# Patient Record
Sex: FEMALE | Race: BLACK OR AFRICAN AMERICAN | RURAL
[De-identification: names, ages, dates, MRNs, and addresses within clinical notes are randomized per-mention and may not be internally consistent; named-entity substitution may affect disease eponyms.]

---

## 2018-03-07 ENCOUNTER — OFFICE VISIT (OUTPATIENT)
Dept: FAMILY MEDICINE CLINIC | Age: 38
End: 2018-03-07

## 2018-03-07 VITALS
RESPIRATION RATE: 18 BRPM | TEMPERATURE: 98.7 F | SYSTOLIC BLOOD PRESSURE: 129 MMHG | BODY MASS INDEX: 29.49 KG/M2 | HEIGHT: 65 IN | OXYGEN SATURATION: 96 % | DIASTOLIC BLOOD PRESSURE: 72 MMHG | HEART RATE: 79 BPM | WEIGHT: 177 LBS

## 2018-03-07 DIAGNOSIS — I10 ESSENTIAL HYPERTENSION: ICD-10-CM

## 2018-03-07 DIAGNOSIS — K21.9 GASTROESOPHAGEAL REFLUX DISEASE, ESOPHAGITIS PRESENCE NOT SPECIFIED: ICD-10-CM

## 2018-03-07 DIAGNOSIS — F41.8 DEPRESSION WITH ANXIETY: ICD-10-CM

## 2018-03-07 DIAGNOSIS — I63.9 CEREBROVASCULAR ACCIDENT (CVA), UNSPECIFIED MECHANISM (HCC): Primary | ICD-10-CM

## 2018-03-07 RX ORDER — GUAIFENESIN 100 MG/5ML
81 LIQUID (ML) ORAL DAILY
COMMUNITY
Start: 2018-03-07 | End: 2018-05-23 | Stop reason: SDUPTHER

## 2018-03-07 RX ORDER — PHENOL/SODIUM PHENOLATE
20 AEROSOL, SPRAY (ML) MUCOUS MEMBRANE DAILY
Qty: 30 TAB | Refills: 6 | COMMUNITY
Start: 2018-03-07 | End: 2018-05-24 | Stop reason: ALTCHOICE

## 2018-03-07 RX ORDER — ATENOLOL 50 MG/1
50 TABLET ORAL DAILY
COMMUNITY
Start: 2018-03-07 | End: 2018-05-23 | Stop reason: SDUPTHER

## 2018-03-07 RX ORDER — HYDROCHLOROTHIAZIDE 25 MG/1
25 TABLET ORAL DAILY
COMMUNITY
Start: 2018-03-07 | End: 2018-05-23 | Stop reason: SDUPTHER

## 2018-03-07 RX ORDER — ATORVASTATIN CALCIUM 40 MG/1
40 TABLET, FILM COATED ORAL DAILY
COMMUNITY
Start: 2018-03-07 | End: 2018-05-23 | Stop reason: SDUPTHER

## 2018-03-07 RX ORDER — FAMOTIDINE 20 MG/1
20 TABLET, FILM COATED ORAL
COMMUNITY
Start: 2018-03-07 | End: 2018-05-23 | Stop reason: SDUPTHER

## 2018-03-07 RX ORDER — LISINOPRIL 10 MG/1
10 TABLET ORAL
COMMUNITY
Start: 2018-03-07 | End: 2018-05-23 | Stop reason: SDUPTHER

## 2018-03-07 RX ORDER — ESCITALOPRAM OXALATE 10 MG/1
10 TABLET ORAL DAILY
Qty: 30 TAB | Refills: 1 | Status: SHIPPED | OUTPATIENT
Start: 2018-03-07 | End: 2018-04-18 | Stop reason: SDUPTHER

## 2018-03-07 NOTE — MR AVS SNAPSHOT
[facility] 
 
 
 [address] [address] 
[phone] Patient: Leonardo Kramer MRN: [id number] DCA:0/92/0098 Visit Information Date & Time Provider Department Dept. Phone Encounter #  
 3/7/2018  1:20 PM Sanjuana Lesches, MD 7 Shaunna Robles [id number] Follow-up Instructions Return in about 2 weeks (around 3/21/2018) for f/u 2 weeks for mood. Corrinne [place] Upcoming Health Maintenance Date Due DTaP/Tdap/Td series (1 - Tdap) 2/22/2002 PAP AKA CERVICAL CYTOLOGY 2/22/2002 Influenza Age 5 to Adult 8/1/2017 Allergies as of 3/7/2018  Review Complete On: 3/7/2018 By: Crescencio Quintero LPN Severity Noted Reaction Type Reactions Carvedilol  03/07/2018    Hives Tramadol  03/07/2018    Nausea Only  
 headaches Current Immunizations  Never Reviewed No immunizations on file. Not reviewed this visit You Were Diagnosed With   
  
 Codes Comments Cerebrovascular accident (CVA), unspecified mechanism ([facility]    -  Primary ICD-10-CM: I63.9 ICD-9-CM: 434.91 Essential hypertension     ICD-10-CM: I10 
ICD-9-CM: 401.9 Gastroesophageal reflux disease, esophagitis presence not specified     ICD-10-CM: K21.9 ICD-9-CM: 530.81 Depression with anxiety     ICD-10-CM: F41.8 ICD-9-CM: 300.4 Vitals BP Pulse Temp Resp Height(growth percentile) Weight(growth percentile) 129/72 79 98.7 °F (37.1 °C) (Oral) 18 5' 5\" (1.651 m) 177 lb (80.3 kg) LMP SpO2 BMI Smoking Status 02/07/2018 (Approximate) 96% 29.45 kg/m2 Former Smoker Vitals History BMI and BSA Data Body Mass Index Body Surface Area  
 29.45 kg/m 2 1.92 m 2 Preferred Pharmacy Pharmacy Name Phone [address] [place], [place] - [address] [phone] Your Updated Medication List  
  
   
 This list is accurate as of 3/7/18  2:13 PM.  Always use your most recent med list.  
  
  
  
  
 aspirin 81 mg chewable tablet Take 1 Tab by mouth daily. atenolol 50 mg tablet Commonly known as:  TENORMIN Take 1 Tab by mouth daily. atorvastatin 40 mg tablet Commonly known as:  LIPITOR Take 1 Tab by mouth daily. escitalopram oxalate 10 mg tablet Commonly known as:  Melvinia Calk Take 1 Tab by mouth daily. Indications: ANXIETY WITH DEPRESSION  
  
 famotidine 20 mg tablet Commonly known as:  PEPCID Take 1 Tab by mouth nightly. hydroCHLOROthiazide 25 mg tablet Commonly known as:  HYDRODIURIL Take 1 Tab by mouth daily. Indications: hypertension  
  
 lisinopril 10 mg tablet Commonly known as:  Luetta Manzanilla Take 1 Tab by mouth nightly. Indications: hypertension Omeprazole delayed release 20 mg tablet Commonly known as:  PRILOSEC D/R Take 1 Tab by mouth daily. Prescriptions Sent to Pharmacy Refills  
 escitalopram oxalate (LEXAPRO) 10 mg tablet 1 Sig: Take 1 Tab by mouth daily. Indications: ANXIETY WITH DEPRESSION Class: Normal  
 Pharmacy: [address] [place] [address] #: [phone] Route: Oral  
  
Follow-up Instructions Return in about 2 weeks (around 3/21/2018) for f/u 2 weeks for mood. .  
  
  
Patient Instructions Anxiety Disorder: Care Instructions Your Care Instructions Anxiety is a normal reaction to stress. Difficult situations can cause you to have symptoms such as sweaty palms and a nervous feeling. In an anxiety disorder, the symptoms are far more severe. Constant worry, muscle tension, trouble sleeping, nausea and diarrhea, and other symptoms can make normal daily activities difficult or impossible. These symptoms may occur for no reason, and they can affect your work, school, or social life. Medicines, counseling, and self-care can all help. Follow-up care is a key part of your treatment and safety. Be sure to make and go to all appointments, and call your doctor if you are having problems. It's also a good idea to know your test results and keep a list of the medicines you take. How can you care for yourself at home? · Take medicines exactly as directed. Call your doctor if you think you are having a problem with your medicine. · Go to your counseling sessions and follow-up appointments. · Recognize and accept your anxiety. Then, when you are in a situation that makes you anxious, say to yourself, \"This is not an emergency. I feel uncomfortable, but I am not in danger. I can keep going even if I feel anxious. \" · Be kind to your body: ¨ Relieve tension with exercise or a massage. ¨ Get enough rest. 
¨ Avoid alcohol, caffeine, nicotine, and illegal drugs. They can increase your anxiety level and cause sleep problems. ¨ Learn and do relaxation techniques. See below for more about these techniques. · Engage your mind. Get out and do something you enjoy. Go to a [employer] movie, or take a walk or hike. Plan your day. Having too much or too little to do can make you anxious. · Keep a record of your symptoms. Discuss your fears with a good friend or family member, or join a support group for people with similar problems. Talking to others sometimes relieves stress. · Get involved in social groups, or volunteer to help others. Being alone sometimes makes things seem worse than they are. · Get at least 30 minutes of exercise on most days of the week to relieve stress. Walking is a good choice. You also may want to do other activities, such as running, swimming, cycling, or playing tennis or team sports. Relaxation techniques Do relaxation exercises 10 to 20 minutes a day. You can play soothing, relaxing music while you do them, if you wish. · Tell others in your house that you are going to do your relaxation exercises. Ask them not to disturb you. · Find a comfortable place, away from all distractions and noise. · Lie down on your back, or sit with your back straight. · Focus on your breathing. Make it slow and steady. · Breathe in through your nose. Breathe out through either your nose or mouth. · Breathe deeply, filling up the area between your navel and your rib cage. Breathe so that your belly goes up and down. · Do not hold your breath. · Breathe like this for 5 to 10 minutes. Notice the feeling of calmness throughout your whole body. As you continue to breathe slowly and deeply, relax by doing the following for another 5 to 10 minutes: · Tighten and relax each muscle group in your body. You can begin at your toes and work your way up to your head. · Imagine your muscle groups relaxing and becoming heavy. · Empty your mind of all thoughts. · Let yourself relax more and more deeply. · Become aware of the state of calmness that surrounds you. · When your relaxation time is over, you can bring yourself back to alertness by moving your fingers and toes and then your hands and feet and then stretching and moving your entire body. Sometimes people fall asleep during relaxation, but they usually wake up shortly afterward. · Always give yourself time to return to full alertness before you drive a car or do anything that might cause an accident if you are not fully alert. Never play a relaxation tape while you drive a car. When should you call for help? Call 911 anytime you think you may need emergency care. For example, call if: 
? · You feel you cannot stop from hurting yourself or someone else. ? Keep the numbers for these national suicide hotlines: 1-098-127-TALK (6-512.185.2798) and 5-336-STYNJAW (3-168.898.8884). If you or someone you know talks about suicide or feeling hopeless, get help right away. ? Watch closely for changes in your health, and be sure to contact your doctor if: 
? · You have anxiety or fear that affects your life. ? · You have symptoms of anxiety that are new or different from those you had before. Where can you learn more? Go to http://rose-mike.info/. Enter P754 in the search box to learn more about \"Anxiety Disorder: Care Instructions. \" Current as of: May 12, 2017 Content Version: 11.4 © 2694-5523 [employer]. Care instructions adapted under license by [employer] (which disclaims liability or warranty for this information). If you have questions about a medical condition or this instruction, always ask your healthcare professional. [address] any warranty or liability for your use of this information. Introducing [facility]! [facility] introduces VSoft patient portal. Now you can access parts of your medical record, email your doctor's office, and request medication refills online. 1. In your internet browser, go to [email or web address] 2. Click on the First Time User? Click Here link in the Sign In box. You will see the New Member Sign Up page. 3. Enter your VSoft Access Code exactly as it appears below. You will not need to use this code after youve completed the sign-up process. If you do not sign up before the expiration date, you must request a new code. · VSoft Access Code: [email or web address] Expires: 6/5/2018  1:15 PM 
 
4. Enter the last four digits of your Social Security Number (xxxx) and Date of Birth (mm/dd/yyyy) as indicated and click Submit. You will be taken to the next sign-up page. 5. Create a [address] ID. This will be your VSoft login ID and cannot be changed, so think of one that is secure and easy to remember. 6. Create a VSoft password. You can change your password at any time. 7. Enter your Password Reset Question and Answer. This can be used at a later time if you forget your password. 8. Enter your e-mail address.  You will receive e-mail notification when new information is available in XtremIO. 9. Click Sign Up. You can now view and download portions of your medical record. 10. Click the Download Summary menu link to download a portable copy of your medical information. If you have questions, please visit the Frequently Asked Questions section of the XtremIO website. Remember, XtremIO is NOT to be used for urgent needs. For medical emergencies, dial 911. Now available from your iPhone and Android! Please provide this summary of care documentation to your next provider. If you have any questions after today's visit, please call [phone].

## 2018-03-07 NOTE — PATIENT INSTRUCTIONS
Anxiety Disorder: Care Instructions  Your Care Instructions    Anxiety is a normal reaction to stress. Difficult situations can cause you to have symptoms such as sweaty palms and a nervous feeling. In an anxiety disorder, the symptoms are far more severe. Constant worry, muscle tension, trouble sleeping, nausea and diarrhea, and other symptoms can make normal daily activities difficult or impossible. These symptoms may occur for no reason, and they can affect your work, school, or social life. Medicines, counseling, and self-care can all help. Follow-up care is a key part of your treatment and safety. Be sure to make and go to all appointments, and call your doctor if you are having problems. It's also a good idea to know your test results and keep a list of the medicines you take. How can you care for yourself at home? · Take medicines exactly as directed. Call your doctor if you think you are having a problem with your medicine. · Go to your counseling sessions and follow-up appointments. · Recognize and accept your anxiety. Then, when you are in a situation that makes you anxious, say to yourself, \"This is not an emergency. I feel uncomfortable, but I am not in danger. I can keep going even if I feel anxious. \"  · Be kind to your body:  ¨ Relieve tension with exercise or a massage. ¨ Get enough rest.  ¨ Avoid alcohol, caffeine, nicotine, and illegal drugs. They can increase your anxiety level and cause sleep problems. ¨ Learn and do relaxation techniques. See below for more about these techniques. · Engage your mind. Get out and do something you enjoy. Go to a funny movie, or take a walk or hike. Plan your day. Having too much or too little to do can make you anxious. · Keep a record of your symptoms. Discuss your fears with a good friend or family member, or join a support group for people with similar problems. Talking to others sometimes relieves stress.   · Get involved in social groups, or volunteer to help others. Being alone sometimes makes things seem worse than they are. · Get at least 30 minutes of exercise on most days of the week to relieve stress. Walking is a good choice. You also may want to do other activities, such as running, swimming, cycling, or playing tennis or team sports. Relaxation techniques  Do relaxation exercises 10 to 20 minutes a day. You can play soothing, relaxing music while you do them, if you wish. · Tell others in your house that you are going to do your relaxation exercises. Ask them not to disturb you. · Find a comfortable place, away from all distractions and noise. · Lie down on your back, or sit with your back straight. · Focus on your breathing. Make it slow and steady. · Breathe in through your nose. Breathe out through either your nose or mouth. · Breathe deeply, filling up the area between your navel and your rib cage. Breathe so that your belly goes up and down. · Do not hold your breath. · Breathe like this for 5 to 10 minutes. Notice the feeling of calmness throughout your whole body. As you continue to breathe slowly and deeply, relax by doing the following for another 5 to 10 minutes:  · Tighten and relax each muscle group in your body. You can begin at your toes and work your way up to your head. · Imagine your muscle groups relaxing and becoming heavy. · Empty your mind of all thoughts. · Let yourself relax more and more deeply. · Become aware of the state of calmness that surrounds you. · When your relaxation time is over, you can bring yourself back to alertness by moving your fingers and toes and then your hands and feet and then stretching and moving your entire body. Sometimes people fall asleep during relaxation, but they usually wake up shortly afterward. · Always give yourself time to return to full alertness before you drive a car or do anything that might cause an accident if you are not fully alert.  Never play a relaxation tape while you drive a car. When should you call for help? Call 911 anytime you think you may need emergency care. For example, call if:  ? · You feel you cannot stop from hurting yourself or someone else. ? Keep the numbers for these national suicide hotlines: 0-491-821-TALK (0-587-987--290-968-0704) and 6-627-EYJRDFD (2-203.966.8158). If you or someone you know talks about suicide or feeling hopeless, get help right away. ? Watch closely for changes in your health, and be sure to contact your doctor if:  ? · You have anxiety or fear that affects your life. ? · You have symptoms of anxiety that are new or different from those you had before. Where can you learn more? Go to http://rose-mike.info/. Enter P754 in the search box to learn more about \"Anxiety Disorder: Care Instructions. \"  Current as of: May 12, 2017  Content Version: 11.4  © 0471-7009 [employer]. Care instructions adapted under license by [employer] (which disclaims liability or warranty for this information). If you have questions about a medical condition or this instruction, always ask your healthcare professional. [address] any warranty or liability for your use of this information.

## 2018-03-07 NOTE — PROGRESS NOTES
Nella Hayes  41 y.o. female  1980  [address]  [address] [place] [address]  [id number]     [facility]: Progress Note       Encounter Date: 3/7/2018    Chief Complaint   Patient presents with    New Patient       History provided by patient  History of Present Illness   Nella Hayes is a 41 y.o. female who presents to clinic today for:      NEW PATIENT  New patient who presents to establish PCP care. I personally reviewed and updated the patient's medical, surgical, family and social history. PREVIOUS PRIMARY CARE PROVIDER and SPECIALISTS  Rashel. Patient decided to come to [facility] due to recommendation of her PT therapist.     RECORDS  Provided by patient: None. SPECIALISTS  Patient Care Team:  Olene Cooks, DO as Consulting Provider (Neurology)  Roselia Hernandez MD as Physician (Hematology and Oncology)  Saundra Koyanagi, MD as Physician (Cardiology)  Immanuel Silveira NP as Nurse Practitioner (Neurosurgery)    Stroke  Patient had embolic stroke with left side hemiparesis. Was treated by [facility] had embolectomy after trial of tPA. Has been followed OT, PT, and speech therapy. Has continued to follow with Dr. Perry Neal of [address] [place] [address] neurosurgery. Anxiety with depression Review:  Patient is seen for depression and anxiety disorder. Current treatment includes none. Prior treatments: none. Ongoig symptoms include: anhedonia, decreased sleep, depressed mood, poor concentration and psychomotor retardation none. Patient denies: suicidal ideation homocidal ideation. Reported side effects from the treatment: none    Health Maintenance  Patient states that she is   Health Maintenance Due   Topic Date Due    DTaP/Tdap/Td series (1 - Tdap) 02/22/2002    PAP AKA CERVICAL CYTOLOGY  02/22/2002    Influenza Age 5 to Adult  08/01/2017     Review of Systems   Review of Systems   Constitutional: Negative for chills, diaphoresis and fever. Respiratory: Negative for cough, shortness of breath and wheezing. Cardiovascular: Negative for chest pain, palpitations and leg swelling. Gastrointestinal: Negative for abdominal pain, constipation, diarrhea, nausea and vomiting. Skin: Negative for itching and rash. Neurological: Negative for dizziness, weakness and headaches. Psychiatric/Behavioral: Negative for depression and suicidal ideas. The patient is not nervous/anxious. Vitals/Objective:     Vitals:    03/07/18 1322   BP: 129/72   Pulse: 79   Resp: 18   Temp: 98.7 °F (37.1 °C)   TempSrc: Oral   SpO2: 96%   Weight: 177 lb (80.3 kg)   Height: 5' 5\" (1.651 m)     Body mass index is 29.45 kg/(m^2). Wt Readings from Last 3 Encounters:   03/07/18 177 lb (80.3 kg)       Physical Exam   Constitutional: She is oriented to person, place, and time. She appears well-developed and well-nourished. HENT:   Head: Normocephalic and atraumatic. Cardiovascular: Normal rate and regular rhythm. Pulmonary/Chest: Effort normal and breath sounds normal.   Abdominal: Bowel sounds are normal.   Musculoskeletal: She exhibits no edema or tenderness. Neurological: She is alert and oriented to person, place, and time. She exhibits abnormal muscle tone (left UE/LE is weaker 2/5). No results found for this or any previous visit (from the past 24 hour(s)). Assessment and Plan:     Encounter Diagnoses     ICD-10-CM ICD-9-CM   1. Cerebrovascular accident (CVA), unspecified mechanism ([facility] I63.9 434.91   2. Essential hypertension I10 401.9   3. Gastroesophageal reflux disease, esophagitis presence not specified K21.9 530.81   4. Depression with anxiety F41.8 300.4       1. Cerebrovascular accident (CVA), unspecified mechanism ([facility]  Patient is continuing with PT/OT/speech. - atorvastatin (LIPITOR) 40 mg tablet; Take 1 Tab by mouth daily. - aspirin 81 mg chewable tablet; Take 1 Tab by mouth daily. 2. Essential hypertension  Well controlled today. Patient would like to pend lab work until next week.    - lisinopril (PRINIVIL, ZESTRIL) 10 mg tablet; Take 1 Tab by mouth nightly. Indications: hypertension  - hydroCHLOROthiazide (HYDRODIURIL) 25 mg tablet; Take 1 Tab by mouth daily. Indications: hypertension  - atenolol (TENORMIN) 50 mg tablet; Take 1 Tab by mouth daily. 3. Gastroesophageal reflux disease, esophagitis presence not specified  - famotidine (PEPCID) 20 mg tablet; Take 1 Tab by mouth nightly. - Omeprazole delayed release (PRILOSEC D/R) 20 mg tablet; Take 1 Tab by mouth daily. Dispense: 30 Tab; Refill: 6    4. Depression with anxiety  Will start trial of lexapro. Close follow for side effects.   - escitalopram oxalate (LEXAPRO) 10 mg tablet; Take 1 Tab by mouth daily. Indications: ANXIETY WITH DEPRESSION  Dispense: 30 Tab; Refill: 1    I have discussed the diagnosis with the patient and the intended plan as seen in the above orders. she has expressed understanding. The patient has received an after-visit summary and questions were answered concerning future plans. I have discussed medication side effects and warnings with the patient as well. Electronically Signed: Tamia Chapa MD     History/Allergies   Patients past medical, surgical and family histories were reviewed and updated.     Past Medical History:   Diagnosis Date    Depression     GERD (gastroesophageal reflux disease)     Headache     Hypertension     Stroke [facility]) 11/2017 2017      Past Surgical History:   Procedure Laterality Date    HX DILATION AND CURETTAGE      D & C     Family History   Problem Relation Age of Onset    Schizophrenia Mother     Depression Mother     Anxiety Mother     Asthma Mother     Crohn's Disease Mother     Stroke Father     Hypertension Father     No Known Problems Sister     No Known Problems Brother     Other Son      pyloric stenosis    Cancer Maternal Grandfather      Lung    Other Paternal Grandfather      Aneursym    Colon Cancer Maternal Uncle     Cancer Maternal Uncle      Brain cancer     Social History     Social History    Marital status: SINGLE     Spouse name: N/A    Number of children: N/A    Years of education: N/A     Occupational History    Not on file. Social History Main Topics    Smoking status: Former Smoker     Packs/day: 1.80     Years: 10.00     Types: Cigarettes    Smokeless tobacco: Never Used    Alcohol use No    Drug use: No    Sexual activity: Not on file     Other Topics Concern    Not on file     Social History Narrative    No narrative on file         Allergies   Allergen Reactions    Carvedilol Hives    Tramadol Nausea Only     headaches       Disposition     Follow-up Disposition:  Return in about 2 weeks (around 3/21/2018) for f/u 2 weeks for mood. .    Future Appointments  Date Time Provider Norman Flores   3/21/2018 8:20 AM [facility]            Current Medications after this visit     Current Outpatient Prescriptions   Medication Sig    atorvastatin (LIPITOR) 40 mg tablet Take 1 Tab by mouth daily.  lisinopril (PRINIVIL, ZESTRIL) 10 mg tablet Take 1 Tab by mouth nightly. Indications: hypertension    famotidine (PEPCID) 20 mg tablet Take 1 Tab by mouth nightly.  hydroCHLOROthiazide (HYDRODIURIL) 25 mg tablet Take 1 Tab by mouth daily. Indications: hypertension    Omeprazole delayed release (PRILOSEC D/R) 20 mg tablet Take 1 Tab by mouth daily.  atenolol (TENORMIN) 50 mg tablet Take 1 Tab by mouth daily.  aspirin 81 mg chewable tablet Take 1 Tab by mouth daily.  escitalopram oxalate (LEXAPRO) 10 mg tablet Take 1 Tab by mouth daily. Indications: ANXIETY WITH DEPRESSION     No current facility-administered medications for this visit. There are no discontinued medications.

## 2018-03-23 ENCOUNTER — OFFICE VISIT (OUTPATIENT)
Dept: FAMILY MEDICINE CLINIC | Age: 38
End: 2018-03-23

## 2018-03-23 VITALS
DIASTOLIC BLOOD PRESSURE: 84 MMHG | WEIGHT: 174 LBS | TEMPERATURE: 98.3 F | BODY MASS INDEX: 28.99 KG/M2 | RESPIRATION RATE: 18 BRPM | HEIGHT: 65 IN | SYSTOLIC BLOOD PRESSURE: 145 MMHG | OXYGEN SATURATION: 100 % | HEART RATE: 61 BPM

## 2018-03-23 DIAGNOSIS — I63.9 CEREBROVASCULAR ACCIDENT (CVA), UNSPECIFIED MECHANISM (HCC): ICD-10-CM

## 2018-03-23 DIAGNOSIS — F32.1 MODERATE SINGLE CURRENT EPISODE OF MAJOR DEPRESSIVE DISORDER (HCC): ICD-10-CM

## 2018-03-23 DIAGNOSIS — Z01.419 WELL WOMAN EXAM WITH ROUTINE GYNECOLOGICAL EXAM: Primary | ICD-10-CM

## 2018-03-23 DIAGNOSIS — I10 ESSENTIAL HYPERTENSION: ICD-10-CM

## 2018-03-23 DIAGNOSIS — K21.9 GASTROESOPHAGEAL REFLUX DISEASE, ESOPHAGITIS PRESENCE NOT SPECIFIED: ICD-10-CM

## 2018-03-23 NOTE — PROGRESS NOTES
1. Have you been to the ER, urgent care clinic, or been hospitalized since your last visit? No     2. Have you seen or consulted any other health care providers outside of the [address] [place] [address] since your last visit?   No     Reviewed record in preparation for visit and have necessary documentation  opportunity was given for questions  Goals that were addressed and/or need to be completed during or after this appointment include     Health Maintenance Due   Topic Date Due    DTaP/Tdap/Td series (1 - Tdap) 02/22/2002    PAP AKA CERVICAL CYTOLOGY  02/22/2002    Influenza Age 9 to Adult  08/01/2017

## 2018-03-23 NOTE — PROGRESS NOTES
Well Woman Check Up       Subjective:     41 y.o.  F for routine annual exam    LMP:Patient's last menstrual period was 2018 (approximate). Menses:Regular.   Method of protection: none    Social History: single partner, contraception - none. Pertinent past medical history: hypertension, stroke. PAP History:   Patient reports hx of abnormal pap followed by normal pap smears. Colonoscopy:  n/a    DEXA: n/a    Lipids: n/a      Family History   Problem Relation Age of Onset   Rosalva Montgomery Schizophrenia Mother     Depression Mother     Anxiety Mother     Asthma Mother     Crohn's Disease Mother     Stroke Father     Hypertension Father     No Known Problems Sister     No Known Problems Brother     Other Son      pyloric stenosis    Cancer Maternal Grandfather      Lung    Other Paternal Grandfather      Aneursym    Colon Cancer Maternal Uncle     Cancer Maternal Uncle      Brain cancer     Social History   Substance Use Topics    Smoking status: Former Smoker     Packs/day: 1.80     Years: 10.00     Types: Cigarettes    Smokeless tobacco: Never Used    Alcohol use No        ROS:  Feeling well. No dyspnea or chest pain on exertion. No abdominal pain, change in bowel habits, black or bloody stools. No urinary tract symptoms. GYN ROS: no breast pain or new or enlarging lumps on self exam, she complains of white, thick discharge for the past month. No neurological complaints. Objective:     Visit Vitals    /89    Pulse 60    Temp 98.3 °F (36.8 °C) (Oral)    Resp 18    Ht 5' 5\" (1.651 m)    Wt 174 lb (78.9 kg)    LMP 2018 (Approximate)    SpO2 100%    BMI 28.96 kg/m2     Gen: The patient appears well, alert, oriented x 3, in no distress. HEENT:  Normal, atraumatic, KAYCE. Neck: supple. No adenopathy or thyromegaly. Lungs:  clear, good air entry, no wheezes, rhonchi or rales. CV: S1 and S2 normal, no murmurs, regular rate and rhythm.   Abdomen: soft without tenderness, guarding, mass or organomegaly. Extremities:  no edema, normal peripheral pulses. Neurological:normal, no focal findings. BREAST EXAM: breasts appear normal, no suspicious masses, no skin or nipple changes or axillary nodes. PELVIC EXAM: normal external genitalia, vulva, vagina, cervix, uterus and adnexa exam chaperoned by: Rachelle Wallace LPN    Assessment/Plan:     Encounter Diagnoses     ICD-10-CM ICD-9-CM   1. Well woman exam with routine gynecological exam Z01.419 V72.31   2. Essential hypertension I10 401.9   3. Gastroesophageal reflux disease, esophagitis presence not specified K21.9 530.81   4. Moderate single current episode of major depressive disorder (HCC) F32.1 296.22   5. Cerebrovascular accident (CVA), unspecified mechanism ([facility] [address] I63.9 434.91       1. Well woman exam with routine gynecological exam  - T PALLIDUM SCREEN W/REFLEX  - HIV 1/2 AG/AB, 4TH GENERATION,W RFLX CONFIRM  - CT/NG/T.VAGINALIS AMPLIFICATION  - PAP IG, APTIMA HPV AND RFX 16/18,45 ([id number])    2. Essential hypertension  Not at goal. Will have patient RTC in 1 month for recheck and med adjustment.   - METABOLIC PANEL, BASIC    3. Gastroesophageal reflux disease, esophagitis presence not specified  Patient has been on H2 blocker and PPI for > 1 reshma. Reports severe GERD and bloating if she attempts to taper.   - VITAMIN B12    4. Moderate single current episode of major depressive disorder [facility])  Patient has not yet started medication as she has to be have dental work done and was afraid it may have an interaction. 5. Cerebrovascular accident (CVA), unspecified mechanism ([facility] [address]  - LIPID PANEL  - HEMOGLOBIN A1C WITH EAG Sanjuana Lesches, MD  03/23/18    Follow-up Disposition:  Return in about 4 weeks (around 4/20/2018) for Mood and BP. No future appointments.

## 2018-03-23 NOTE — PATIENT INSTRUCTIONS
Learning About Pap Tests  What is a Pap test?    The Pap test (also called a Pap smear) is a screening test for cancer of the cervix, which is the lower part of the uterus that opens into the vagina. The test can help your doctor find early changes in the cells that could lead to cancer. During the test, the doctor or nurse will insert a tool called a speculum into your vagina. The speculum gently spreads apart the vaginal walls. That allows your doctor to see inside the vagina and the cervix. He or she uses a cotton swab or brush to collect cell samples from your cervix. Try to schedule the test when you're not having your period. To get ready for a Pap test, avoid douches, tampons, vaginal medicines, sprays, or powders for at least a day before you have the test.  When should you have a Pap test?  Women should start having Pap tests at age 24. If you are younger than 24 and are sexually active, it's still a good idea to have regular testing for sexually transmitted infections. · Women 21 to 30 should have Pap tests every 3 years. · Women 30 to 72 may continue to have a Pap test every 3 years as long as their results are normal. Or they can choose to have a Pap test and an HPV test. This is called co-testing. With co-testing, women can have screening every 5 years as long as their test results are normal.  · Women 72 and older may no longer need Pap tests. When to stop having Pap tests depends on your medical history, your overall health, and your risk of cervical cell changes or cervical cancer. Talk with your doctor about whether you should stop or continue to have Pap tests. He or she can help you decide. These recommendations do not apply to women who have had a serious abnormal Pap test result or who have certain health problems. Talk to your doctor about how often you should be tested. There is also a newer test called a primary HPV test. It is used in women ages 22 and older.  And it is done every 3 years, as long as a woman's test results are normal. A woman who has this test doesn't need to have a Pap test.  Having the HPV vaccine does not change your need for screening tests. Women who have had the HPV vaccine should follow the same screening schedules as women who have not had the vaccine. What happens after the test?  The sample of cells taken during your test will be sent to a lab so that an expert can look at the cells. It usually takes a week or two to get the results back. · A normal result means that the test did not find any abnormal cells in the sample. · An abnormal result can mean many things. Most of these are not cancer. The results of your test may be abnormal because:  ¨ You have an infection of the vagina or cervix, such as a yeast infection. ¨ You have an IUD (intrauterine device for birth control). ¨ You have low estrogen levels after menopause that are causing the cells to change. ¨ You have cell changes that may be a sign of precancer or cancer. The results are ranked based on how serious the changes might be. Where can you learn more? Go to http://rose-mike.info/. Enter P919 in the search box to learn more about \"Learning About Pap Tests. \"  Current as of: May 12, 2017  Content Version: 11.4  © 5910-9015 [employer]wise, [employer]. Care instructions adapted under license by [employer] (which disclaims liability or warranty for this information). If you have questions about a medical condition or this instruction, always ask your healthcare professional. [place] [address] any warranty or liability for your use of this information.

## 2018-03-23 NOTE — MR AVS SNAPSHOT
[address] [address] 
[phone] Patient: Akshat Rodriguez MRN: [id number] YCF:[birth year]8 Visit Information Date & Time Provider Department Dept. Phone Encounter #  
 3/23/2018  8:20 AM Fredrick Cooks, MD [address] [id number] Follow-up Instructions Return in about 4 weeks (around 4/20/2018) for Mood and BP. Upcoming Health Maintenance Date Due DTaP/Tdap/Td series (1 - Tdap) 2/22/2002 PAP AKA CERVICAL CYTOLOGY 2/22/2002 Influenza Age 5 to Adult 8/1/2017 Allergies as of 3/23/2018  Review Complete On: 3/23/2018 By: Fredrick Cooks, MD  
  
 Severity Noted Reaction Type Reactions Carvedilol  03/07/2018    Hives Tramadol  03/07/2018    Nausea Only  
 headaches Current Immunizations  Never Reviewed No immunizations on file. Not reviewed this visit You Were Diagnosed With   
  
 Codes Comments Well woman exam with routine gynecological exam    -  Primary ICD-10-CM: S56.835 ICD-9-CM: V72.31 Essential hypertension     ICD-10-CM: I10 
ICD-9-CM: 401.9 Gastroesophageal reflux disease, esophagitis presence not specified     ICD-10-CM: K21.9 ICD-9-CM: 530.81 Moderate single current episode of major depressive disorder (HCC)     ICD-10-CM: F32.1 ICD-9-CM: 296.22 Cerebrovascular accident (CVA), unspecified mechanism [facility]     ICD-10-CM: I63.9 ICD-9-CM: 434.91 Vitals BP Pulse Temp Resp Height(growth percentile) Weight(growth percentile) 160/89 60 98.3 °F (36.8 °C) (Oral) 18 5' 5\" (1.651 m) 174 lb (78.9 kg) LMP SpO2 BMI OB Status Smoking Status 03/05/2018 (Approximate) 100% 28.96 kg/m2 Having regular periods Former Smoker Vitals History BMI and BSA Data Body Mass Index Body Surface Area  
 28.96 kg/m 2 1.9 m 2 Preferred Pharmacy Pharmacy Name Phone [address] [place], [place] - [address] [phone] Your Updated Medication List  
  
   
This list is accurate as of 3/23/18  9:01 AM.  Always use your most recent med list.  
  
  
  
  
 aspirin 81 mg chewable tablet Take 1 Tab by mouth daily. atenolol 50 mg tablet Commonly known as:  TENORMIN Take 1 Tab by mouth daily. atorvastatin 40 mg tablet Commonly known as:  LIPITOR Take 1 Tab by mouth daily. escitalopram oxalate 10 mg tablet Commonly known as:  Hildy Buffalo Take 1 Tab by mouth daily. Indications: ANXIETY WITH DEPRESSION  
  
 famotidine 20 mg tablet Commonly known as:  PEPCID Take 1 Tab by mouth nightly. hydroCHLOROthiazide 25 mg tablet Commonly known as:  HYDRODIURIL Take 1 Tab by mouth daily. Indications: hypertension  
  
 lisinopril 10 mg tablet Commonly known as:  Marlane Khushboo Take 1 Tab by mouth nightly. Indications: hypertension Omeprazole delayed release 20 mg tablet Commonly known as:  PRILOSEC D/R Take 1 Tab by mouth daily. We Performed the Following CT/NG/T.VAGINALIS AMPLIFICATION [id number] CPT(R)] HEMOGLOBIN A1C WITH EAG [48707 CPT(R)] HIV 1/2 AG/AB, 4TH GENERATION,W RFLX CONFIRM [id number] CPT(R)] LIPID PANEL [09528 CPT(R)] METABOLIC PANEL, BASIC [71433 CPT(R)] PAP IG, APTIMA HPV AND RFX 16/18,45 (088600) [[id number] Custom] T PALLIDUM SCREEN W/REFLEX [[id number] Custom] VITAMIN B12 [id number] CPT(R)] Follow-up Instructions Return in about 4 weeks (around 4/20/2018) for Mood and BP. Patient Instructions Learning About Pap Tests What is a Pap test? 
 
The Pap test (also called a Pap smear) is a screening test for cancer of the cervix, which is the lower part of the uterus that opens into the vagina. The test can help your doctor find early changes in the cells that could lead to cancer. During the test, the doctor or nurse will insert a tool called a speculum into your vagina. The speculum gently spreads apart the vaginal walls. That allows your doctor to see inside the vagina and the cervix. He or she uses a cotton swab or brush to collect cell samples from your cervix. Try to schedule the test when you're not having your period. To get ready for a Pap test, avoid douches, tampons, vaginal medicines, sprays, or powders for at least a day before you have the test. 
When should you have a Pap test? 
Women should start having Pap tests at age 24. If you are younger than 24 and are sexually active, it's still a good idea to have regular testing for sexually transmitted infections. · Women 21 to 30 should have Pap tests every 3 years. · Women 30 to 72 may continue to have a Pap test every 3 years as long as their results are normal. Or they can choose to have a Pap test and an HPV test. This is called co-testing. With co-testing, women can have screening every 5 years as long as their test results are normal. 
· Women 72 and older may no longer need Pap tests. When to stop having Pap tests depends on your medical history, your overall health, and your risk of cervical cell changes or cervical cancer. Talk with your doctor about whether you should stop or continue to have Pap tests. He or she can help you decide. These recommendations do not apply to women who have had a serious abnormal Pap test result or who have certain health problems. Talk to your doctor about how often you should be tested. There is also a newer test called a primary HPV test. It is used in women ages 22 and older. And it is done every 3 years, as long as a woman's test results are normal. A woman who has this test doesn't need to have a Pap test. 
Having the HPV vaccine does not change your need for screening tests.  Women who have had the HPV vaccine should follow the same screening schedules as women who have not had the vaccine. What happens after the test? 
The sample of cells taken during your test will be sent to a lab so that an expert can look at the cells. It usually takes a week or two to get the results back. · A normal result means that the test did not find any abnormal cells in the sample. · An abnormal result can mean many things. Most of these are not cancer. The results of your test may be abnormal because: 
¨ You have an infection of the vagina or cervix, such as a yeast infection. ¨ You have an IUD (intrauterine device for birth control). ¨ You have low estrogen levels after menopause that are causing the cells to change. ¨ You have cell changes that may be a sign of precancer or cancer. The results are ranked based on how serious the changes might be. Where can you learn more? Go to http://rose-mike.info/. Enter P919 in the search box to learn more about \"Learning About Pap Tests. \" Current as of: May 12, 2017 Content Version: 11.4 © 9288-0476 [employer]. Care instructions adapted under license by [employer] (which disclaims liability or warranty for this information). If you have questions about a medical condition or this instruction, always ask your healthcare professional. [address] any warranty or liability for your use of this information. Introducing [facility]! [facility] introduces [employer] patient portal. Now you can access parts of your medical record, email your doctor's office, and request medication refills online. 1. In your internet browser, go to [email or web address] 2. Click on the First Time User? Click Here link in the Sign In box. You will see the New Member Sign Up page. 3. Enter your [employer] Access Code exactly as it appears below. You will not need to use this code after youve completed the sign-up process.  If you do not sign up before the expiration date, you must request a new code. · [employer] Access Code: [email or web address] Expires: 6/5/2018  2:15 PM 
 
4. Enter the last four digits of your Social Security Number (xxxx) and Date of Birth (mm/dd/yyyy) as indicated and click Submit. You will be taken to the next sign-up page. 5. Create a [employer] ID. This will be your [employer] login ID and cannot be changed, so think of one that is secure and easy to remember. 6. Create a [employer] password. You can change your password at any time. 7. Enter your Password Reset Question and Answer. This can be used at a later time if you forget your password. 8. Enter your e-mail address. You will receive e-mail notification when new information is available in [address]. 9. Click Sign Up. You can now view and download portions of your medical record. 10. Click the Download Summary menu link to download a portable copy of your medical information. If you have questions, please visit the Frequently Asked Questions section of the [employer] website. Remember, [employer] is NOT to be used for urgent needs. For medical emergencies, dial 911. Now available from your iPhone and Android! Please provide this summary of care documentation to your next provider. Your primary care clinician is listed as Larry Iqbal. If you have any questions after today's visit, please call [phone].

## 2018-03-27 ENCOUNTER — TELEPHONE (OUTPATIENT)
Dept: FAMILY MEDICINE CLINIC | Age: 38
End: 2018-03-27

## 2018-03-27 DIAGNOSIS — A59.01 TRICHOMONAL VAGINITIS: Primary | ICD-10-CM

## 2018-03-27 LAB
BUN SERPL-MCNC: 11 MG/DL (ref 6–20)
BUN/CREAT SERPL: 10 (ref 9–23)
C TRACH RRNA SPEC QL NAA+PROBE: NEGATIVE
CALCIUM SERPL-MCNC: 9.2 MG/DL (ref 8.7–10.2)
CHLORIDE SERPL-SCNC: 99 MMOL/L (ref 96–106)
CHOLEST SERPL-MCNC: 132 MG/DL (ref 100–199)
CO2 SERPL-SCNC: 28 MMOL/L (ref 18–29)
CREAT SERPL-MCNC: 1.13 MG/DL (ref 0.57–1)
CYTOLOGIST CVX/VAG CYTO: NORMAL
CYTOLOGY CVX/VAG DOC THIN PREP: NORMAL
DIAGNOSTIC IMP SPEC-IMP: NORMAL
DX ICD CODE: NORMAL
DX ICD CODE: NORMAL
EST. AVERAGE GLUCOSE BLD GHB EST-MCNC: 120 MG/DL
GFR SERPLBLD CREATININE-BSD FMLA CKD-EPI: 62 ML/MIN/1.73
GFR SERPLBLD CREATININE-BSD FMLA CKD-EPI: 72 ML/MIN/1.73
GLUCOSE SERPL-MCNC: 77 MG/DL (ref 65–99)
HBA1C MFR BLD: 5.8 % (ref 4.8–5.6)
HDLC SERPL-MCNC: 63 MG/DL
HIV 1+2 AB+HIV1 P24 AG SERPL QL IA: REACTIVE
HIV 2 AB SERPL QL IA: NEGATIVE
HIV1 AB SERPLBLD QL IA.RAPID: NEGATIVE
HIV1 RNA SERPL QL NAA+PROBE: NEGATIVE
HPV I/H RISK 4 DNA CVX QL PROBE+SIG AMP: NEGATIVE
INTERPRETATION: NEGATIVE
LDLC SERPL CALC-MCNC: 59 MG/DL (ref 0–99)
Lab: NORMAL
N GONORRHOEA RRNA SPEC QL NAA+PROBE: NEGATIVE
OTHER STN SPEC: NORMAL
PATH REPORT.FINAL DX SPEC: NORMAL
POTASSIUM SERPL-SCNC: 3.9 MMOL/L (ref 3.5–5.2)
SODIUM SERPL-SCNC: 144 MMOL/L (ref 134–144)
STAT OF ADQ CVX/VAG CYTO-IMP: NORMAL
T PALLIDUM AB SER QL IA: NEGATIVE
T VAGINALIS RRNA SPEC QL NAA+PROBE: NEGATIVE
TRIGL SERPL-MCNC: 50 MG/DL (ref 0–149)
VIT B12 SERPL-MCNC: 590 PG/ML (ref 232–1245)
VLDLC SERPL CALC-MCNC: 10 MG/DL (ref 5–40)

## 2018-03-27 RX ORDER — METRONIDAZOLE 500 MG/1
2000 TABLET ORAL
Qty: 4 TAB | Refills: 0 | Status: SHIPPED | OUTPATIENT
Start: 2018-03-27 | End: 2018-03-27

## 2018-03-27 NOTE — TELEPHONE ENCOUNTER
Called patient and verified ID x 2. Informed patient that her pap smear was normal and HPV was negative. Also informed her that she is positive for T. Vaginalis, which is an STI. She reports that she has not recent partners. Advised that treatment would be sent to pharmacy. Srinivasan Hicks, please complete Health Department notification.   Fredrick Cooks, MD

## 2018-03-28 ENCOUNTER — TELEPHONE (OUTPATIENT)
Dept: FAMILY MEDICINE CLINIC | Age: 38
End: 2018-03-28

## 2018-03-28 NOTE — TELEPHONE ENCOUNTER
Please call patient to schedule an appointment with Dr. Cheri Chaves for results. Notes for provider to see patient. Had discussed case with Dr. Aldo Caicedo, infectious disease. Feels that this is likely a false negative but recommends checking for autoimmune disease or sickle cell trait. Inquire as to what her occup[ation was prior to stroke for risk factors and if she has a hx of IVDA. Repeat test in 1 month.      Tamia Chapa MD

## 2018-03-29 ENCOUNTER — OFFICE VISIT (OUTPATIENT)
Dept: FAMILY MEDICINE CLINIC | Age: 38
End: 2018-03-29

## 2018-03-29 VITALS
HEART RATE: 59 BPM | SYSTOLIC BLOOD PRESSURE: 122 MMHG | OXYGEN SATURATION: 97 % | DIASTOLIC BLOOD PRESSURE: 69 MMHG | RESPIRATION RATE: 18 BRPM | TEMPERATURE: 98.5 F | HEIGHT: 65 IN | WEIGHT: 172.8 LBS | BODY MASS INDEX: 28.79 KG/M2

## 2018-03-29 DIAGNOSIS — Z78.9 FALSE POSITIVE HIV SEROLOGY: Primary | ICD-10-CM

## 2018-03-29 NOTE — PROGRESS NOTES
CC: Lab results    HPI: Pt is a 41 y.o. female who presents for lab results. Pt recently had a positive 4th generation HIV screening test, followed by negative HIV-Ab and RNA confirmatory tests. Her case was discussed with an Infectious Disease specialist and it was recommended to r/o autoimmune disease and sickle cell trait and retest in one month. Pt states that she has been extensively tested for autoimmune diseases and had an electrophoresis to r/o sickle trait. She cannot remember the results but knows it was done at [facility] or [facility] and does not want to repeat it today. Chart review shows a normal lupus anticoagulant, ESR, CRP, HIV and Syphilis IgG from 11/2017 among other normal w/u for coagulation disorders. Review of lab results also shows that she had evidence of trichomonas on pap testing but a nuswab (which resulted later) was negative. Pt states she has been having an awful time at home since being diagnosed with trichomonas and has been fighting with her long-time partner. Both deny sexual exposures outside of their relationship. She has not taken the Flagyl. She is feeling depressed and overwhelmed in general since her stroke, trying to figure out her new baseline and come to terms with the things she is no longer able to do. She reports decreased appetite and has lost 5lbs this month. She has a hard time falling asleep and a hard time concentrating but is not sure if some of that is residual from her stroke. She denies SI/HI. Pt reports she had been a very active person and also enjoyed reading. She is not able to exercise anymore because she gets dizzy and feels like she is going to fall. Her vision gets blurry after reading for a short time. Her Neurologist told her she is not able to work. She has a son who is active in sports and is feeling very badly that she cannot be as involved in his activities anymore. She is also not able to drive and cannot get him back and forth to practice.  She feels guilty that he is missing out. She was prescribed Lexapro at her last visit and filled it but has not taken it yet and is not sure she wants to. She also has a burning pain in her left arm since the stroke that has not improved with time or PT. Past Medical History:   Diagnosis Date    Depression     GERD (gastroesophageal reflux disease)     Headache     Hypertension     Stroke ([address] 11/2017 2017       Family History   Problem Relation Age of Onset    Schizophrenia Mother     Depression Mother     Anxiety Mother     Asthma Mother     Crohn's Disease Mother     Stroke Father     Hypertension Father     No Known Problems Sister     No Known Problems Brother     Other Son      pyloric stenosis    Cancer Maternal Grandfather      Lung    Other Paternal Grandfather      Aneursym    Colon Cancer Maternal Uncle     Cancer Maternal Uncle      Brain cancer       Social History   Substance Use Topics    Smoking status: Former Smoker     Packs/day: 1.80     Years: 10.00     Types: Cigarettes    Smokeless tobacco: Never Used    Alcohol use No       ROS:  Positive only when bolded  Constitutional: HA, F/C, changes in weight  Genitourinary: Vaginal discharge (reason for last visit)    PE:  Visit Vitals    /69 (BP 1 Location: Left arm, BP Patient Position: Sitting)    Pulse (!) 59    Temp 98.5 °F (36.9 °C) (Oral)    Resp 18    Ht 5' 5\" (1.651 m)    Wt 172 lb 12.8 oz (78.4 kg)    LMP 03/05/2018 (Approximate)    SpO2 97%    BMI 28.76 kg/m2     Gen: Pt sitting in chair, in NAD  Head: Normocephalic, atraumatic  Eyes: Sclera anicteric, EOM grossly intact  Throat: MMM  Neck: Supple  Extrem: Atraumatic, no cyanosis  Neuro: Alert, appropriate, sometimes with derailment of thought process. A/P: Pt is a 41 y.o. female who presents to discuss lab results. Long discussion with patient today about labs and plans going forward.  She would like to hold off on further testing at this point until we can get full records from [facility] and her hospitalization at [facility].   - HIV test: Advised that this is very likely false positive. Will repeat in one month, and if at that time it appears that autoimmune labs and Hgb electrophoresis have not been performed, will discuss getting those as well  - Trichomonas: Advised pt that pap cytology for trich has a higher false positive rate than HAL (which has very good sensitivity and specificity). Explained that the pap results came back first and that is why she was told she had trichomonas. Given the above information and lack of recent exposures, would say that cytology for trick is most likely also a false positive. Her significant other has an appt next week to be tested for trichomonas. Advised pt that we can discuss all of this with him at that visit if she is amenable to it. She plans to be present at that visit. Advised that she does not need to take the Flagyl  - Depression: Advised that this could be multifactorial, partly an adjustment disorder as she is trying to find her new baseline, and possibly an alteration of her brain chemistry from the stroke. Discussed that she will likely never be back at her old \"normal\" and our goal is to get her to a new \"normal\" that she is happy with. Advised that the Zoloft would likely help both her mood symptoms as well as her neuropathic pain. She can continue to think about this and we will f/u in 1 month. - RTC in 1 month for repeat HIV test    Over 50% of the 45 minutes face to face with Rolf Butler consisted of counseling and/or discussing treatment plans in reference to her HIV test, trichomonas test, stroke and depression, as above. Discussed diagnoses in detail with patient. Medication risks/benefits/side effects discussed with patient. All of the patient's questions were addressed. The patient understands and agrees with our plan of care.   The patient knows to call back if they are unsure of or forget any changes we discussed today or if the symptoms change. The patient received an After-Visit Summary which contains VS, orders, medication list and allergy list. This can be used as a \"mini-medical record\" should they have to seek medical care while out of town. Current Outpatient Prescriptions on File Prior to Visit   Medication Sig Dispense Refill    atorvastatin (LIPITOR) 40 mg tablet Take 1 Tab by mouth daily.  lisinopril (PRINIVIL, ZESTRIL) 10 mg tablet Take 1 Tab by mouth nightly. Indications: hypertension      famotidine (PEPCID) 20 mg tablet Take 1 Tab by mouth nightly.  hydroCHLOROthiazide (HYDRODIURIL) 25 mg tablet Take 1 Tab by mouth daily. Indications: hypertension      Omeprazole delayed release (PRILOSEC D/R) 20 mg tablet Take 1 Tab by mouth daily. 30 Tab 6    atenolol (TENORMIN) 50 mg tablet Take 1 Tab by mouth daily.  aspirin 81 mg chewable tablet Take 1 Tab by mouth daily.  escitalopram oxalate (LEXAPRO) 10 mg tablet Take 1 Tab by mouth daily. Indications: ANXIETY WITH DEPRESSION 30 Tab 1     No current facility-administered medications on file prior to visit.

## 2018-03-29 NOTE — PROGRESS NOTES
Chief Complaint   Patient presents with    Abnormal Lab Results     1. Have you been to the ER, urgent care clinic since your last visit? Hospitalized since your last visit? No    2. Have you seen or consulted any other health care providers outside of the [address] [place] [address] since your last visit? Include any pap smears or colon screening.  No

## 2018-03-29 NOTE — PATIENT INSTRUCTIONS
Escitalopram (By mouth)   Escitalopram (ep-cbw-QLS-oh-pram)  Treats depression and generalized anxiety disorder (HORACIO). Brand Name(s): Lexapro   There may be other brand names for this medicine. When This Medicine Should Not Be Used: This medicine is not right for everyone. Do not use it if you had an allergic reaction to escitalopram or citalopram.  How to Use This Medicine:   Liquid, Tablet  · Take this medicine as directed. You may need to take it for a month or more before you feel better. Your dose may need to be changed to find out what works best for you. · Measure the oral liquid medicine with a marked measuring spoon, oral syringe, or medicine cup. · This medicine should come with a Medication Guide. Ask your pharmacist for a copy if you do not have one. · Missed dose: Take a dose as soon as you remember. If it is almost time for your next dose, wait until then and take a regular dose. Do not take extra medicine to make up for a missed dose. · Store the medicine in a closed container at room temperature, away from heat, moisture, and direct light. Drugs and Foods to Avoid:   Ask your doctor or pharmacist before using any other medicine, including over-the-counter medicines, vitamins, and herbal products. · Do not use this medicine together with pimozide. Do not use this medicine and an MAO inhibitor (MAOI) within 14 days of each other. · Some medicines can affect how escitalopram works. Tell your doctor if you are using the following:   ¨ Buspirone, carbamazepine, fentanyl, lithium, Wojciech's wort, tramadol, or tryptophan supplements  ¨ Amphetamines  ¨ Blood thinner (including warfarin)  ¨ Diuretic (water pill)  ¨ NSAID pain or arthritis medicine (including aspirin, celecoxib, diclofenac, ibuprofen, naproxen)  ¨ Triptan medicine to treat migraine headaches (including sumatriptan)  · Tell your doctor if you use anything else that makes you sleepy.  Some examples are allergy medicine, narcotic pain medicine, and alcohol. · Do not drink alcohol while you are using this medicine. Warnings While Using This Medicine:   · Tell your doctor if you are pregnant or breastfeeding, or if you have kidney disease, liver disease, bleeding problems, glaucoma, heart disease, or a seizure disorder. · For some children, teenagers, and young adults, this medicine may increase mental or emotional problems. This may lead to thoughts of suicide and violence. Talk with your doctor right away if you have any thoughts or behavior changes that concern you. Tell your doctor if you or anyone in your family has a history of bipolar disorder or suicide attempts. · This medicine may cause the following problems:   ¨ Serotonin syndrome (more likely when taken with certain medicines)  ¨ Low sodium levels  ¨ Increased risk of bleeding problems  · This medicine may make you dizzy or drowsy. Do not drive or do anything that could be dangerous until you know how this medicine affects you. · Your doctor may want to monitor your child's weight and height, because this medicine may cause decreased appetite and weight loss in children. · Do not stop using this medicine suddenly. Your doctor will need to slowly decrease your dose before you stop it completely. · Your doctor will check your progress and the effects of this medicine at regular visits. Keep all appointments. · Keep all medicine out of the reach of children. Never share your medicine with anyone.   Possible Side Effects While Using This Medicine:   Call your doctor right away if you notice any of these side effects:  · Allergic reaction: Itching or hives, swelling in your face or hands, swelling or tingling in your mouth or throat, chest tightness, trouble breathing  · Anxiety, restlessness, fever, sweating, muscle spasms, nausea, vomiting, diarrhea, seeing or hearing things that are not there  · Confusion, weakness, and muscle twitching  · Eye pain, vision changes, seeing halos around lights  · Fast, pounding, or uneven heartbeat  · Feeling more excited or energetic than usual, racing thoughts, trouble sleeping  · Seizures  · Thoughts of hurting yourself or others, unusual behavior  · Unusual bleeding or bruising  If you notice these less serious side effects, talk with your doctor:   · Dizziness, drowsiness, or sleepiness  · Dry mouth  · Headache  · Nausea, constipation, diarrhea  · Sexual problems  If you notice other side effects that you think are caused by this medicine, tell your doctor. Call your doctor for medical advice about side effects. You may report side effects to FDA at 2-857-FDA-4350  © 2017 [address] Information is for End User's use only and may not be sold, redistributed or otherwise used for commercial purposes. The above information is an  only. It is not intended as medical advice for individual conditions or treatments. Talk to your doctor, nurse or pharmacist before following any medical regimen to see if it is safe and effective for you.

## 2018-03-29 NOTE — MR AVS SNAPSHOT
[address] 
 
 
 [address] [facility] [address] [address] 
[phone] Patient: Uli Martell MRN: [id number] MAY:[birth year] Visit Information Date & Time Provider Department Dept. Phone Encounter #  
 3/29/2018  3:45 PM Talita Magana  [facility] [phone] [id number] Follow-up Instructions Return in about 4 weeks (around 4/26/2018) for follow-up. Your Appointments 4/23/2018  9:30 AM  
ROUTINE CARE with Talita Magana MD  
[address] [facility] ([address]) Appt Note: 4wk f/u-Bp [address] [place] [id number] [address]  
[place] [address] [place] [id number] Upcoming Health Maintenance Date Due DTaP/Tdap/Td series (1 - Tdap) 2/22/2002 Influenza Age 5 to Adult 8/1/2017 PAP AKA CERVICAL CYTOLOGY 3/23/2021 Allergies as of 3/29/2018  Review Complete On: 3/29/2018 By: Dilan Jo LPN Severity Noted Reaction Type Reactions Carvedilol  03/07/2018    Hives Tramadol  03/07/2018    Nausea Only  
 headaches Current Immunizations  Never Reviewed No immunizations on file. Not reviewed this visit You Were Diagnosed With   
  
 Codes Comments False positive HIV serology    -  Primary ICD-10-CM: Z78.9 ICD-9-CM: V49.89 Vitals BP Pulse Temp Resp Height(growth percentile) Weight(growth percentile) 122/69 (BP 1 Location: Left arm, BP Patient Position: Sitting) (!) 59 98.5 °F (36.9 °C) (Oral) 18 5' 5\" (1.651 m) 172 lb 12.8 oz (78.4 kg) LMP SpO2 BMI OB Status Smoking Status 03/05/2018 (Approximate) 97% 28.76 kg/m2 Having regular periods Former Smoker Vitals History BMI and BSA Data Body Mass Index Body Surface Area 28.76 kg/m 2 1.9 m 2 Preferred Pharmacy Pharmacy Name Phone [address] [place], [place] - [address] [phone] Your Updated Medication List  
  
   
This list is accurate as of 3/29/18  4:32 PM.  Always use your most recent med list.  
  
  
  
  
 aspirin 81 mg chewable tablet Take 1 Tab by mouth daily. atenolol 50 mg tablet Commonly known as:  TENORMIN Take 1 Tab by mouth daily. atorvastatin 40 mg tablet Commonly known as:  LIPITOR Take 1 Tab by mouth daily. escitalopram oxalate 10 mg tablet Commonly known as:  Clementina Reshma Take 1 Tab by mouth daily. Indications: ANXIETY WITH DEPRESSION  
  
 famotidine 20 mg tablet Commonly known as:  PEPCID Take 1 Tab by mouth nightly. hydroCHLOROthiazide 25 mg tablet Commonly known as:  HYDRODIURIL Take 1 Tab by mouth daily. Indications: hypertension  
  
 lisinopril 10 mg tablet Commonly known as:  Metta Lawman Take 1 Tab by mouth nightly. Indications: hypertension Omeprazole delayed release 20 mg tablet Commonly known as:  PRILOSEC D/R Take 1 Tab by mouth daily. Follow-up Instructions Return in about 4 weeks (around 4/26/2018) for follow-up. Patient Instructions Escitalopram (By mouth) Escitalopram (fy-nlh-SIN-oh-pram) Treats depression and generalized anxiety disorder (HORACIO). Brand Name(s): Lexapro There may be other brand names for this medicine. When This Medicine Should Not Be Used: This medicine is not right for everyone. Do not use it if you had an allergic reaction to escitalopram or citalopram. 
How to Use This Medicine:  
Liquid, Tablet · Take this medicine as directed. You may need to take it for a month or more before you feel better. Your dose may need to be changed to find out what works best for you. · Measure the oral liquid medicine with a marked measuring spoon, oral syringe, or medicine cup. · This medicine should come with a Medication Guide.  Ask your pharmacist for a copy if you do not have one. · Missed dose: Take a dose as soon as you remember. If it is almost time for your next dose, wait until then and take a regular dose. Do not take extra medicine to make up for a missed dose. · Store the medicine in a closed container at room temperature, away from heat, moisture, and direct light. Drugs and Foods to Avoid: Ask your doctor or pharmacist before using any other medicine, including over-the-counter medicines, vitamins, and herbal products. · Do not use this medicine together with pimozide. Do not use this medicine and an MAO inhibitor (MAOI) within 14 days of each other. · Some medicines can affect how escitalopram works. Tell your doctor if you are using the following:  
¨ Buspirone, carbamazepine, fentanyl, lithium, Wojciech's wort, tramadol, or tryptophan supplements ¨ Amphetamines ¨ Blood thinner (including warfarin) ¨ Diuretic (water pill) ¨ NSAID pain or arthritis medicine (including aspirin, celecoxib, diclofenac, ibuprofen, naproxen) ¨ Triptan medicine to treat migraine headaches (including sumatriptan) · Tell your doctor if you use anything else that makes you sleepy. Some examples are allergy medicine, narcotic pain medicine, and alcohol. · Do not drink alcohol while you are using this medicine. Warnings While Using This Medicine: · Tell your doctor if you are pregnant or breastfeeding, or if you have kidney disease, liver disease, bleeding problems, glaucoma, heart disease, or a seizure disorder. · For some children, teenagers, and young adults, this medicine may increase mental or emotional problems. This may lead to thoughts of suicide and violence. Talk with your doctor right away if you have any thoughts or behavior changes that concern you. Tell your doctor if you or anyone in your family has a history of bipolar disorder or suicide attempts. · This medicine may cause the following problems: ¨ Serotonin syndrome (more likely when taken with certain medicines) ¨ Low sodium levels ¨ Increased risk of bleeding problems · This medicine may make you dizzy or drowsy. Do not drive or do anything that could be dangerous until you know how this medicine affects you. · Your doctor may want to monitor your child's weight and height, because this medicine may cause decreased appetite and weight loss in children. · Do not stop using this medicine suddenly. Your doctor will need to slowly decrease your dose before you stop it completely. · Your doctor will check your progress and the effects of this medicine at regular visits. Keep all appointments. · Keep all medicine out of the reach of children. Never share your medicine with anyone. Possible Side Effects While Using This Medicine:  
Call your doctor right away if you notice any of these side effects: · Allergic reaction: Itching or hives, swelling in your face or hands, swelling or tingling in your mouth or throat, chest tightness, trouble breathing · Anxiety, restlessness, fever, sweating, muscle spasms, nausea, vomiting, diarrhea, seeing or hearing things that are not there · Confusion, weakness, and muscle twitching · Eye pain, vision changes, seeing halos around lights · Fast, pounding, or uneven heartbeat · Feeling more excited or energetic than usual, racing thoughts, trouble sleeping · Seizures · Thoughts of hurting yourself or others, unusual behavior · Unusual bleeding or bruising If you notice these less serious side effects, talk with your doctor: · Dizziness, drowsiness, or sleepiness · Dry mouth 
· Headache · Nausea, constipation, diarrhea · Sexual problems If you notice other side effects that you think are caused by this medicine, tell your doctor. Call your doctor for medical advice about side effects. You may report side effects to FDA at 6-268-FDA-7068 © 2017 [address]  Information is for End User's use only and may not be sold, redistributed or otherwise used for commercial purposes. The above information is an  only. It is not intended as medical advice for individual conditions or treatments. Talk to your doctor, nurse or pharmacist before following any medical regimen to see if it is safe and effective for you. Introducing [facility]! [employer] introduces "[employer] patient portal. Now you can access parts of your medical record, email your doctor's office, and request medication refills online. 1. In your internet browser, go to [email or web address] 2. Click on the First Time User? Click Here link in the Sign In box. You will see the New Member Sign Up page. 3. Enter your "[employer] Access Code exactly as it appears below. You will not need to use this code after youve completed the sign-up process. If you do not sign up before the expiration date, you must request a new code. · "[employer] Access Code: [email or web address] Expires: 6/5/2018  2:15 PM 
 
4. Enter the last four digits of your Social Security Number (xxxx) and Date of Birth (mm/dd/yyyy) as indicated and click Submit. You will be taken to the next sign-up page. 5. Create a "[employer] ID. This will be your "[employer] login ID and cannot be changed, so think of one that is secure and easy to remember. 6. Create a "[employer] password. You can change your password at any time. 7. Enter your Password Reset Question and Answer. This can be used at a later time if you forget your password. 8. Enter your e-mail address. You will receive e-mail notification when new information is available in [address] 9. Click Sign Up. You can now view and download portions of your medical record. 10. Click the Download Summary menu link to download a portable copy of your medical information. If you have questions, please visit the Frequently Asked Questions section of the [employer] website. Remember, [employer] is NOT to be used for urgent needs. For medical emergencies, dial 911. Now available from your iPhone and Android! Please provide this summary of care documentation to your next provider. Your primary care clinician is listed as Cam Lawrecne. If you have any questions after today's visit, please call [phone].

## 2018-04-18 DIAGNOSIS — F41.8 DEPRESSION WITH ANXIETY: ICD-10-CM

## 2018-04-18 RX ORDER — ESCITALOPRAM OXALATE 10 MG/1
TABLET ORAL
Qty: 30 TAB | Refills: 0 | Status: SHIPPED | OUTPATIENT
Start: 2018-04-18 | End: 2018-05-24 | Stop reason: DRUGHIGH

## 2018-04-23 ENCOUNTER — TELEPHONE (OUTPATIENT)
Dept: FAMILY MEDICINE CLINIC | Age: 38
End: 2018-04-23

## 2018-04-23 NOTE — TELEPHONE ENCOUNTER
Patient is being followed by pre-diabetes. She is also 41year old and is not due for a mammogram until age 37 as she has not Famhx of breast cancer.      Winston Sam MD

## 2018-04-23 NOTE — TELEPHONE ENCOUNTER
Re Marcos called from [address] to advise Dr that pt has a Hx of being borderline diabetic.  Pt also is due for a mammogram.

## 2018-05-21 ENCOUNTER — TELEPHONE (OUTPATIENT)
Dept: FAMILY MEDICINE CLINIC | Age: 38
End: 2018-05-21

## 2018-05-23 DIAGNOSIS — I10 ESSENTIAL HYPERTENSION: ICD-10-CM

## 2018-05-23 DIAGNOSIS — K21.9 GASTROESOPHAGEAL REFLUX DISEASE, ESOPHAGITIS PRESENCE NOT SPECIFIED: ICD-10-CM

## 2018-05-23 DIAGNOSIS — I63.9 CEREBROVASCULAR ACCIDENT (CVA), UNSPECIFIED MECHANISM (HCC): ICD-10-CM

## 2018-05-23 RX ORDER — HYDROCHLOROTHIAZIDE 25 MG/1
25 TABLET ORAL DAILY
Qty: 30 TAB | Refills: 0 | Status: SHIPPED | OUTPATIENT
Start: 2018-05-23 | End: 2018-07-25 | Stop reason: SDUPTHER

## 2018-05-23 RX ORDER — GUAIFENESIN 100 MG/5ML
81 LIQUID (ML) ORAL DAILY
Qty: 90 TAB | Refills: 0 | Status: SHIPPED | OUTPATIENT
Start: 2018-05-23 | End: 2018-10-18 | Stop reason: SDUPTHER

## 2018-05-23 RX ORDER — FAMOTIDINE 20 MG/1
20 TABLET, FILM COATED ORAL
Qty: 30 TAB | Refills: 0 | Status: SHIPPED | OUTPATIENT
Start: 2018-05-23 | End: 2018-07-25 | Stop reason: SDUPTHER

## 2018-05-23 RX ORDER — LISINOPRIL 10 MG/1
10 TABLET ORAL
Qty: 30 TAB | Refills: 0 | Status: SHIPPED | OUTPATIENT
Start: 2018-05-23 | End: 2018-06-09 | Stop reason: SDUPTHER

## 2018-05-23 RX ORDER — ATORVASTATIN CALCIUM 40 MG/1
40 TABLET, FILM COATED ORAL DAILY
Qty: 30 TAB | Refills: 0 | Status: SHIPPED | OUTPATIENT
Start: 2018-05-23 | End: 2018-06-09 | Stop reason: SDUPTHER

## 2018-05-23 RX ORDER — ATENOLOL 50 MG/1
50 TABLET ORAL DAILY
Qty: 30 TAB | Refills: 0 | Status: SHIPPED | OUTPATIENT
Start: 2018-05-23 | End: 2018-06-25 | Stop reason: SDUPTHER

## 2018-05-24 ENCOUNTER — OFFICE VISIT (OUTPATIENT)
Dept: FAMILY MEDICINE CLINIC | Age: 38
End: 2018-05-24

## 2018-05-24 VITALS
HEIGHT: 65 IN | OXYGEN SATURATION: 100 % | DIASTOLIC BLOOD PRESSURE: 72 MMHG | HEART RATE: 54 BPM | WEIGHT: 166 LBS | RESPIRATION RATE: 18 BRPM | BODY MASS INDEX: 27.66 KG/M2 | SYSTOLIC BLOOD PRESSURE: 110 MMHG | TEMPERATURE: 97.6 F

## 2018-05-24 DIAGNOSIS — F32.1 CURRENT MODERATE EPISODE OF MAJOR DEPRESSIVE DISORDER WITHOUT PRIOR EPISODE (HCC): ICD-10-CM

## 2018-05-24 DIAGNOSIS — G93.9 BRAIN DAMAGE: ICD-10-CM

## 2018-05-24 DIAGNOSIS — I63.9 CEREBROVASCULAR ACCIDENT (CVA), UNSPECIFIED MECHANISM (HCC): ICD-10-CM

## 2018-05-24 DIAGNOSIS — Z78.9 FALSE POSITIVE HIV SEROLOGY: Primary | ICD-10-CM

## 2018-05-24 DIAGNOSIS — L20.9 ATOPIC DERMATITIS, UNSPECIFIED TYPE: ICD-10-CM

## 2018-05-24 DIAGNOSIS — K21.9 GASTROESOPHAGEAL REFLUX DISEASE, ESOPHAGITIS PRESENCE NOT SPECIFIED: ICD-10-CM

## 2018-05-24 RX ORDER — PANTOPRAZOLE SODIUM 40 MG/1
40 TABLET, DELAYED RELEASE ORAL DAILY
Qty: 30 TAB | Refills: 5 | Status: SHIPPED | OUTPATIENT
Start: 2018-05-24 | End: 2018-05-25 | Stop reason: ALTCHOICE

## 2018-05-24 RX ORDER — TRIAMCINOLONE ACETONIDE 1 MG/G
CREAM TOPICAL 2 TIMES DAILY
Qty: 15 G | Refills: 3 | Status: SHIPPED | OUTPATIENT
Start: 2018-05-24 | End: 2018-10-18 | Stop reason: SDUPTHER

## 2018-05-24 RX ORDER — ESCITALOPRAM OXALATE 20 MG/1
20 TABLET ORAL DAILY
Qty: 30 TAB | Refills: 5 | Status: SHIPPED | OUTPATIENT
Start: 2018-05-24 | End: 2018-10-18 | Stop reason: SDUPTHER

## 2018-05-24 NOTE — PROGRESS NOTES
1. Have you been to the ER, urgent care clinic since your last visit? Hospitalized since your last visit? no    2. Have you seen or consulted any other health care providers outside of the [facility] since your last visit? Including any pap smears or colon screening. Neurologist and psych doctor     Reviewed record in preparation for visit and have necessary documentation    Pt did not bring medication to office visit for review  Opportunity was given for questions    Goals that were addressed and/or need to be completed during or after this appointment include   Health Maintenance Due   Topic Date Due    DTaP/Tdap/Td series (1 - Tdap) 02/22/2002     Body mass index is 27.62 kg/(m^2).

## 2018-05-24 NOTE — PROGRESS NOTES
CC: f/u false positive HIV test    HPI: Pt is a 41 y.o. female who presents for f/u false-positive HIV test. She is a poor historian 2/2 brain damage from her stroke. The ID doctor had recommended repeat HIV in one month along with some basic Rheum labs and Hgb electrophoresis to look into causes of false-positive HIV, especially given her h/o stroke at young age. Pt was not able to refill her prilosec and isn't sure why. She continues to have problems with pain in her arm since the stroke but does not want to make any medication changes. She is not sure the Lexapro is helping for her mood, but is hesitant to switch it. She is amenable to increasing the dose. She has poor appetite and continues to lose weight. She is having a hard time adjusting to the limitations on her activity since her stroke. She is under the impression that Neurology does not want her to drive or exercise but is not sure of the details. We have not received the notes from this visit yet. She is overwhelmed by the number of doctors she is seeing and is having a hard time keeping track of what they are all recommending for her. She is still working with PT and OT and feels it is helpful. Her PT was recommending a sleeve/glove for her hand and she brought the paperwork to her Neurologist but never heard anything about it being ordered. She recently saw Cards and had a loop monitor placed to look for Afib as a source of emboli potentially causing her stroke. She has two itchy spots on her left shoulder that have been there for the past 3 days. She denies new soaps, detergents, shampoos, sheets, clothes, etc. She reports a h/o sensitive skin and gets hives easily. She has not tried anything for the spots.        Past Medical History:   Diagnosis Date    Depression     GERD (gastroesophageal reflux disease)     Headache     Hypertension     Stroke [facility]) 11/2017 2017       Family History   Problem Relation Age of Onset    Schizophrenia Mother     Depression Mother     Anxiety Mother     Asthma Mother     Crohn's Disease Mother     Stroke Father     Hypertension Father     No Known Problems Sister     No Known Problems Brother     Other Son      pyloric stenosis    Cancer Maternal Grandfather      Lung    Other Paternal Grandfather      Aneursym    Colon Cancer Maternal Uncle     Cancer Maternal Uncle      Brain cancer       Social History   Substance Use Topics    Smoking status: Former Smoker     Packs/day: 1.80     Years: 10.00     Types: Cigarettes    Smokeless tobacco: Never Used    Alcohol use No       ROS:  Positive only when bolded  Constitutional: HA, changes in weight  Respiratory: SOB, wheezing, cough  Cardiovascular: CP, palpitations  Musculoskeletal: Myalgias, arthralgias  Neurological: Changes in gait    PE:  Visit Vitals    /72 (BP 1 Location: Right arm, BP Patient Position: Sitting)    Pulse (!) 54    Temp 97.6 °F (36.4 °C) (Oral)    Resp 18    Ht 5' 5\" (1.651 m)    Wt 166 lb (75.3 kg)    LMP 05/01/2018 (Approximate)    SpO2 100%    BMI 27.62 kg/m2     Gen: Pt sitting in chair, in NAD  Head: Normocephalic, atraumatic  Eyes: Sclera anicteric, EOM grossly intact, PERRL  Throat: MMM, normal lips, tongue and gums  Neck: Supple. +Significant hair growth under chin. CVS: Normal S1, S2, no m/r/g  Resp: CTAB, no wheezes or rales  Extrem: Atraumatic, no cyanosis or edema  Pulses: 2+   Skin: Warm, dry. Two 1cm circular, raised, mildly erythematous patches on her posterior L shoulder. Neuro: Alert, some word finding difficulties. Weakness of LUE and mild contractures of L hand      A/P: Pt is a 41 y.o. female who presents for follow-up false positive HIV  - Will increase Lexapro to 20mg daily. Pt advised of several other options for mood and pain and she will think about those if this does not help. - Repeat HIV  - Hgb electrophoresis, RAJ, RF per ID.  If normal may discuss having her see Rheum for more extensive work-up, however at this time she is not amenable to going to another specialist  - Spoke with pharmacy and omeprazole is requiring a PA. Will try protonix  - Kenalog cream for rash - likely atopic dermatitis  - Will try to get records from her Neurologist at [address] [place] [address] (she is unsure of his name) and her PT (she thinks [facility])  - RTC in 1 month for follow-up      I have reviewed/discussed the above normal BMI with the patient. I have recommended the following interventions: dietary management education, guidance, and counseling . Discussed BMI and healthy weight, but also wanting to achieve this with diet/exercise, not weight loss from poor nutrition and appetite. Encouraged healthy foods and will speak with her Neurologist to determine if she is able to do any exercise. Discussed diagnoses in detail with patient. Medication risks/benefits/side effects discussed with patient. All of the patient's questions were addressed. The patient understands and agrees with our plan of care. The patient knows to call back if they are unsure of or forget any changes we discussed today or if the symptoms change. The patient received an After-Visit Summary which contains VS, orders, medication list and allergy list. This can be used as a \"mini-medical record\" should they have to seek medical care while out of town. Current Outpatient Prescriptions on File Prior to Visit   Medication Sig Dispense Refill    lisinopril (PRINIVIL, ZESTRIL) 10 mg tablet Take 1 Tab by mouth nightly. Indications: hypertension 30 Tab 0    atorvastatin (LIPITOR) 40 mg tablet Take 1 Tab by mouth daily. 30 Tab 0    hydroCHLOROthiazide (HYDRODIURIL) 25 mg tablet Take 1 Tab by mouth daily. Indications: hypertension 30 Tab 0    famotidine (PEPCID) 20 mg tablet Take 1 Tab by mouth nightly. 30 Tab 0    atenolol (TENORMIN) 50 mg tablet Take 1 Tab by mouth daily.  30 Tab 0    aspirin 81 mg chewable tablet Take 1 Tab by mouth daily. 90 Tab 0    escitalopram oxalate (LEXAPRO) 10 mg tablet TAKE ONE TABLET BY MOUTH EVERY DAY 30 Tab 0    Omeprazole delayed release (PRILOSEC D/R) 20 mg tablet Take 1 Tab by mouth daily. 30 Tab 6     No current facility-administered medications on file prior to visit.

## 2018-05-24 NOTE — PATIENT INSTRUCTIONS
1. We are increasing your Lexapro to 20mg daily. This is to help with your mood, sleep, and appetite. You can take two of your 10mg tablets until you run out and a new prescription is waiting for you at the pharmacy. 2. I sent you a cream for the spots on your shoulder. It is called Schedule Savvy. 3. I will talk to your Neurologist (head doctor) and your Physical Therapist to see what your restrictions are and what we can do to get your back to your regular activities. 4. I talked to the pharmacy about your acid reflux medication (prilosec) and we are working on getting that figured out. 5. We are going to check some labs today to try and figure out what is causing some of your medical issues. I will call you with those results. Gastroesophageal Reflux Disease (GERD): Care Instructions  Your Care Instructions    Gastroesophageal reflux disease (GERD) is the backward flow of stomach acid into the esophagus. The esophagus is the tube that leads from your throat to your stomach. A one-way valve prevents the stomach acid from moving up into this tube. When you have GERD, this valve does not close tightly enough. If you have mild GERD symptoms including heartburn, you may be able to control the problem with antacids or over-the-counter medicine. Changing your diet, losing weight, and making other lifestyle changes can also help reduce symptoms. Follow-up care is a key part of your treatment and safety. Be sure to make and go to all appointments, and call your doctor if you are having problems. It's also a good idea to know your test results and keep a list of the medicines you take. How can you care for yourself at home? · Take your medicines exactly as prescribed. Call your doctor if you think you are having a problem with your medicine. · Your doctor may recommend over-the-counter medicine. For mild or occasional indigestion, antacids, such as Tums, Gaviscon, Mylanta, or Maalox, may help.  Your doctor also may recommend over-the-counter acid reducers, such as Pepcid AC, Tagamet HB, Zantac 75, or Prilosec. Read and follow all instructions on the label. If you use these medicines often, talk with your doctor. · Change your eating habits. ¨ It's best to eat several small meals instead of two or three large meals. ¨ After you eat, wait 2 to 3 hours before you lie down. ¨ Chocolate, mint, and alcohol can make GERD worse. ¨ Spicy foods, foods that have a lot of acid (like tomatoes and oranges), and coffee can make GERD symptoms worse in some people. If your symptoms are worse after you eat a certain food, you may want to stop eating that food to see if your symptoms get better. · Do not smoke or chew tobacco. Smoking can make GERD worse. If you need help quitting, talk to your doctor about stop-smoking programs and medicines. These can increase your chances of quitting for good. · If you have GERD symptoms at night, raise the head of your bed 6 to 8 inches by putting the frame on blocks or placing a foam wedge under the head of your mattress. (Adding extra pillows does not work.)  · Do not wear tight clothing around your middle. · Lose weight if you need to. Losing just 5 to 10 pounds can help. When should you call for help? Call your doctor now or seek immediate medical care if:  ? · You have new or different belly pain. ? · Your stools are black and tarlike or have streaks of blood. ? Watch closely for changes in your health, and be sure to contact your doctor if:  ? · Your symptoms have not improved after 2 days. ? · Food seems to catch in your throat or chest.   Where can you learn more? Go to http://rose-mike.info/. Enter S299 in the search box to learn more about \"Gastroesophageal Reflux Disease (GERD): Care Instructions. \"  Current as of: May 12, 2017  Content Version: 11.4  © 7110-4686 [employer]wise, [employer].  Care instructions adapted under license by [employer] (which disclaims liability or warranty for this information). If you have questions about a medical condition or this instruction, always ask your healthcare professional. [address] any warranty or liability for your use of this information.

## 2018-05-24 NOTE — MR AVS SNAPSHOT
[address] [address] 
[phone] Patient: Dedrick Benavides MRN: [id number] ITB:[birth year] Visit Information Date & Time Provider Department Dept. Phone Encounter #  
 5/24/2018 10:20 AM Jacinta Baez MD  [facility] [place] [id number] Follow-up Instructions Return in about 4 weeks (around 6/21/2018) for follow-up. Upcoming Health Maintenance Date Due DTaP/Tdap/Td series (1 - Tdap) 2/22/2002 Influenza Age 5 to Adult 8/1/2018 PAP AKA CERVICAL CYTOLOGY 3/23/2021 Allergies as of 5/24/2018  Review Complete On: 5/24/2018 By: Gauri Monsalve RN Severity Noted Reaction Type Reactions Carvedilol  03/07/2018    Hives Tramadol  03/07/2018    Nausea Only  
 headaches Current Immunizations  Never Reviewed No immunizations on file. Not reviewed this visit You Were Diagnosed With   
  
 Codes Comments False positive HIV serology    -  Primary ICD-10-CM: Z78.9 ICD-9-CM: V49.89 Cerebrovascular accident (CVA), unspecified mechanism ([facility] [address]     ICD-10-CM: I63.9 ICD-9-CM: 434.91 Current moderate episode of major depressive disorder without prior episode ([facility] [address]     ICD-10-CM: F32.1 ICD-9-CM: 296.22 Atopic dermatitis, unspecified type     ICD-10-CM: L20.9 ICD-9-CM: 691.8 Gastroesophageal reflux disease, esophagitis presence not specified     ICD-10-CM: K21.9 ICD-9-CM: 530.81 Vitals BP Pulse Temp Resp Height(growth percentile) Weight(growth percentile) 110/72 (BP 1 Location: Right arm, BP Patient Position: Sitting) (!) 54 97.6 °F (36.4 °C) (Oral) 18 5' 5\" (1.651 m) 166 lb (75.3 kg) LMP SpO2 BMI OB Status Smoking Status 05/01/2018 (Approximate) 100% 27.62 kg/m2 Having regular periods Former Smoker Vitals History BMI and BSA Data  Body Mass Index Body Surface Area  
 27.62 kg/m 2 1.86 m 2  
  
  
 Preferred Pharmacy Pharmacy Name Phone [address] [facility] [phone] Your Updated Medication List  
  
   
This list is accurate as of 5/24/18 12:03 PM.  Always use your most recent med list.  
  
  
  
  
 aspirin 81 mg chewable tablet Take 1 Tab by mouth daily. atenolol 50 mg tablet Commonly known as:  TENORMIN Take 1 Tab by mouth daily. atorvastatin 40 mg tablet Commonly known as:  LIPITOR Take 1 Tab by mouth daily. escitalopram oxalate 20 mg tablet Commonly known as:  Rosslyn Cheryle Take 1 Tab by mouth daily. famotidine 20 mg tablet Commonly known as:  PEPCID Take 1 Tab by mouth nightly. hydroCHLOROthiazide 25 mg tablet Commonly known as:  HYDRODIURIL Take 1 Tab by mouth daily. Indications: hypertension  
  
 lisinopril 10 mg tablet Commonly known as:  Therisa Semen Take 1 Tab by mouth nightly. Indications: hypertension  
  
 pantoprazole 40 mg tablet Commonly known as:  PROTONIX Take 1 Tab by mouth daily. triamcinolone acetonide 0.1 % topical cream  
Commonly known as:  KENALOG Apply  to affected area two (2) times a day. use thin layer. Apply to left shoulder. Prescriptions Sent to Pharmacy Refills  
 escitalopram oxalate (LEXAPRO) 20 mg tablet 5 Sig: Take 1 Tab by mouth daily. Class: Normal  
 Pharmacy: [address] [place] [address] Ph #: [phone] Route: Oral  
 triamcinolone acetonide (KENALOG) 0.1 % topical cream 3 Sig: Apply  to affected area two (2) times a day. use thin layer. Apply to left shoulder. Class: Normal  
 Pharmacy: [address] [place] [address] Ph #: [phone] Route: Topical  
 pantoprazole (PROTONIX) 40 mg tablet 5 Sig: Take 1 Tab by mouth daily. Class: Normal  
 Pharmacy: [address] [place] [address] Ph #: [phone] Route: Oral  
  
We Performed the Following RAJ BY MULTIPLEX FLOW IA, QL [35148 CPT(R)] HEMOGLOBIN FRACTIONATION [[id number] Custom] HIV 1/2 AG/AB, 4TH GENERATION,W RFLX CONFIRM [id number] CPT(R)] RHEUMATOID FACTOR, QL [id number] CPT(R)] Follow-up Instructions Return in about 4 weeks (around 6/21/2018) for follow-up. Patient Instructions 1. We are increasing your Lexapro to 20mg daily. This is to help with your mood, sleep, and appetite. You can take two of your 10mg tablets until you run out and a new prescription is waiting for you at the pharmacy. 2. I sent you a cream for the spots on your shoulder. It is called Recorded Future. 3. I will talk to your Neurologist (head doctor) and your Physical Therapist to see what your restrictions are and what we can do to get your back to your regular activities. 4. I talked to the pharmacy about your acid reflux medication (prilosec) and we are working on getting that figured out. 5. We are going to check some labs today to try and figure out what is causing some of your medical issues. I will call you with those results. Gastroesophageal Reflux Disease (GERD): Care Instructions Your Care Instructions Gastroesophageal reflux disease (GERD) is the backward flow of stomach acid into the esophagus. The esophagus is the tube that leads from your throat to your stomach. A one-way valve prevents the stomach acid from moving up into this tube. When you have GERD, this valve does not close tightly enough. If you have mild GERD symptoms including heartburn, you may be able to control the problem with antacids or over-the-counter medicine. Changing your diet, losing weight, and making other lifestyle changes can also help reduce symptoms. Follow-up care is a key part of your treatment and safety.  Be sure to make and go to all appointments, and call your doctor if you are having problems. It's also a good idea to know your test results and keep a list of the medicines you take. How can you care for yourself at home? · Take your medicines exactly as prescribed. Call your doctor if you think you are having a problem with your medicine. · Your doctor may recommend over-the-counter medicine. For mild or occasional indigestion, antacids, such as Tums, Gaviscon, Mylanta, or Maalox, may help. Your doctor also may recommend over-the-counter acid reducers, such as Pepcid AC, Tagamet HB, Zantac 75, or Prilosec. Read and follow all instructions on the label. If you use these medicines often, talk with your doctor. · Change your eating habits. ¨ It's best to eat several small meals instead of two or three large meals. ¨ After you eat, wait 2 to 3 hours before you lie down. ¨ Chocolate, mint, and alcohol can make GERD worse. ¨ Spicy foods, foods that have a lot of acid (like tomatoes and oranges), and coffee can make GERD symptoms worse in some people. If your symptoms are worse after you eat a certain food, you may want to stop eating that food to see if your symptoms get better. · Do not smoke or chew tobacco. Smoking can make GERD worse. If you need help quitting, talk to your doctor about stop-smoking programs and medicines. These can increase your chances of quitting for good. · If you have GERD symptoms at night, raise the head of your bed 6 to 8 inches by putting the frame on blocks or placing a foam wedge under the head of your mattress. (Adding extra pillows does not work.) · Do not wear tight clothing around your middle. · Lose weight if you need to. Losing just 5 to 10 pounds can help. When should you call for help? Call your doctor now or seek immediate medical care if: 
? · You have new or different belly pain. ? · Your stools are black and tarlike or have streaks of blood. ? Watch closely for changes in your health, and be sure to contact your doctor if: ? · Your symptoms have not improved after 2 days. ? · Food seems to catch in your throat or chest.  
Where can you learn more? Go to http://rose-mike.info/. Enter E264 in the search box to learn more about \"Gastroesophageal Reflux Disease (GERD): Care Instructions. \" Current as of: May 12, 2017 Content Version: 11.4 © 6556-4250 "[employer]". Care instructions adapted under license by [employer] (which disclaims liability or warranty for this information). If you have questions about a medical condition or this instruction, always ask your healthcare professional. [address] any warranty or liability for your use of this information. Introducing [facility]! Nicole Llanos introduces i3 membrane patient portal. Now you can access parts of your medical record, email your doctor's office, and request medication refills online. 1. In your internet browser, go to [email or web address] 2. Click on the First Time User? Click Here link in the Sign In box. You will see the New Member Sign Up page. 3. Enter your i3 membrane Access Code exactly as it appears below. You will not need to use this code after youve completed the sign-up process. If you do not sign up before the expiration date, you must request a new code. · i3 membrane Access Code: [email or web address] Expires: 6/5/2018  2:15 PM 
 
4. Enter the last four digits of your Social Security Number (xxxx) and Date of Birth (mm/dd/yyyy) as indicated and click Submit. You will be taken to the next sign-up page. 5. Create a i3 membrane ID. This will be your i3 membrane login ID and cannot be changed, so think of one that is secure and easy to remember. 6. Create a i3 membrane password. You can change your password at any time. 7. Enter your Password Reset Question and Answer. This can be used at a later time if you forget your password. 8. Enter your e-mail address. You will receive e-mail notification when new information is available in [address] 9. Click Sign Up. You can now view and download portions of your medical record. 10. Click the Download Summary menu link to download a portable copy of your medical information. If you have questions, please visit the Frequently Asked Questions section of the [employer] website. Remember, [employer] is NOT to be used for urgent needs. For medical emergencies, dial 911. Now available from your iPhone and Android! Please provide this summary of care documentation to your next provider. Your primary care clinician is listed as [address] If you have any questions after today's visit, please call [phone].

## 2018-05-25 ENCOUNTER — TELEPHONE (OUTPATIENT)
Dept: FAMILY MEDICINE CLINIC | Age: 38
End: 2018-05-25

## 2018-05-25 DIAGNOSIS — K21.9 GASTROESOPHAGEAL REFLUX DISEASE, ESOPHAGITIS PRESENCE NOT SPECIFIED: Primary | ICD-10-CM

## 2018-05-25 RX ORDER — OMEPRAZOLE 20 MG/1
20 CAPSULE, DELAYED RELEASE ORAL DAILY
Qty: 30 CAP | Refills: 5 | Status: SHIPPED | OUTPATIENT
Start: 2018-05-25 | End: 2018-10-18 | Stop reason: SDUPTHER

## 2018-05-25 NOTE — TELEPHONE ENCOUNTER
Prior Auth obtained for 3 months. Rx re-sent to pharmacy and pharmacist notified. They state they are still having trouble getting it approved on their end and will call back.

## 2018-05-25 NOTE — TELEPHONE ENCOUNTER
Called [facility] and had my name added as referring provider to receive pt's orders and recommendations. Also received prior auth for her PPI. Will fill out and get back to patient once a determination has been made.

## 2018-05-27 LAB
ANA SER QL: NEGATIVE
DIAGNOSTIC IMP SPEC-IMP: NORMAL
HGB A MFR BLD: 97.7 % (ref 96.4–98.8)
HGB A2 MFR BLD COLUMN CHROM: 2.3 % (ref 1.8–3.2)
HGB C MFR BLD: 0 %
HGB F MFR BLD: 0 % (ref 0–2)
HGB FRACT BLD-IMP: NORMAL
HGB OTHER MFR BLD HPLC: 0 %
HGB S BLD QL SOLY: NEGATIVE
HGB S MFR BLD: 0 %
HIV 1+2 AB+HIV1 P24 AG SERPL QL IA: REACTIVE
HIV 2 AB SERPL QL IA: NEGATIVE
HIV1 AB SERPLBLD QL IA.RAPID: NEGATIVE
HIV1 RNA SERPL QL NAA+PROBE: NEGATIVE
INTERPRETATION: NEGATIVE
RHEUMATOID FACT SERPL-ACNC: <10 IU/ML (ref 0–13.9)

## 2018-06-01 ENCOUNTER — TELEPHONE (OUTPATIENT)
Dept: FAMILY MEDICINE CLINIC | Age: 38
End: 2018-06-01

## 2018-06-01 NOTE — TELEPHONE ENCOUNTER
Called to inform pt of recent results. No answer and not able to leave message. HIV still false positive with negative confirmatory testing. RF, RAJ and Hgb fractionation normal. At this point we could consider sending her to ID or Rheum for further investigation into causes of this given her history of stroke at a young age with few risk factors.

## 2018-06-08 ENCOUNTER — TELEPHONE (OUTPATIENT)
Dept: FAMILY MEDICINE CLINIC | Age: 38
End: 2018-06-08

## 2018-10-18 ENCOUNTER — OFFICE VISIT (OUTPATIENT)
Dept: FAMILY MEDICINE CLINIC | Age: 38
End: 2018-10-18

## 2018-10-18 VITALS
HEIGHT: 65 IN | OXYGEN SATURATION: 100 % | BODY MASS INDEX: 25.49 KG/M2 | WEIGHT: 153 LBS | DIASTOLIC BLOOD PRESSURE: 87 MMHG | TEMPERATURE: 98.6 F | HEART RATE: 75 BPM | RESPIRATION RATE: 18 BRPM | SYSTOLIC BLOOD PRESSURE: 151 MMHG

## 2018-10-18 DIAGNOSIS — L20.9 ATOPIC DERMATITIS, UNSPECIFIED TYPE: ICD-10-CM

## 2018-10-18 DIAGNOSIS — F32.1 CURRENT MODERATE EPISODE OF MAJOR DEPRESSIVE DISORDER WITHOUT PRIOR EPISODE (HCC): ICD-10-CM

## 2018-10-18 DIAGNOSIS — K21.9 GASTROESOPHAGEAL REFLUX DISEASE, ESOPHAGITIS PRESENCE NOT SPECIFIED: ICD-10-CM

## 2018-10-18 DIAGNOSIS — L30.8 OTHER ECZEMA: Primary | ICD-10-CM

## 2018-10-18 DIAGNOSIS — I10 ESSENTIAL HYPERTENSION: ICD-10-CM

## 2018-10-18 DIAGNOSIS — I63.9 CEREBROVASCULAR ACCIDENT (CVA), UNSPECIFIED MECHANISM (HCC): ICD-10-CM

## 2018-10-18 RX ORDER — ATORVASTATIN CALCIUM 40 MG/1
40 TABLET, FILM COATED ORAL DAILY
Qty: 30 TAB | Refills: 3 | Status: SHIPPED | OUTPATIENT
Start: 2018-10-18 | End: 2019-09-18 | Stop reason: SDUPTHER

## 2018-10-18 RX ORDER — TRIAMCINOLONE ACETONIDE 1 MG/G
CREAM TOPICAL 2 TIMES DAILY
Qty: 15 G | Refills: 3 | Status: SHIPPED | OUTPATIENT
Start: 2018-10-18 | End: 2018-10-25

## 2018-10-18 RX ORDER — LISINOPRIL 10 MG/1
TABLET ORAL
Qty: 30 TAB | Refills: 3 | Status: SHIPPED | OUTPATIENT
Start: 2018-10-18 | End: 2019-09-18 | Stop reason: SDUPTHER

## 2018-10-18 RX ORDER — AMMONIUM LACTATE 12 G/100G
CREAM TOPICAL 2 TIMES DAILY
Qty: 280 G | Refills: 3 | Status: SHIPPED | OUTPATIENT
Start: 2018-10-18 | End: 2018-10-25

## 2018-10-18 RX ORDER — ESCITALOPRAM OXALATE 20 MG/1
20 TABLET ORAL DAILY
Qty: 30 TAB | Refills: 3 | Status: SHIPPED | OUTPATIENT
Start: 2018-10-18 | End: 2018-10-25 | Stop reason: ALTCHOICE

## 2018-10-18 RX ORDER — TRIAMCINOLONE ACETONIDE 1 MG/G
CREAM TOPICAL 2 TIMES DAILY
Qty: 15 G | Refills: 3 | Status: SHIPPED | OUTPATIENT
Start: 2018-10-18 | End: 2018-10-18 | Stop reason: SDUPTHER

## 2018-10-18 RX ORDER — HYDROCHLOROTHIAZIDE 25 MG/1
TABLET ORAL
Qty: 30 TAB | Refills: 3 | Status: SHIPPED | OUTPATIENT
Start: 2018-10-18 | End: 2019-05-29 | Stop reason: SDUPTHER

## 2018-10-18 RX ORDER — ATENOLOL 50 MG/1
TABLET ORAL
Qty: 30 TAB | Refills: 3 | Status: SHIPPED | OUTPATIENT
Start: 2018-10-18 | End: 2019-09-18 | Stop reason: SDUPTHER

## 2018-10-18 RX ORDER — OMEPRAZOLE 20 MG/1
20 CAPSULE, DELAYED RELEASE ORAL DAILY
Qty: 30 CAP | Refills: 3 | Status: SHIPPED | OUTPATIENT
Start: 2018-10-18 | End: 2019-04-19 | Stop reason: SDUPTHER

## 2018-10-18 RX ORDER — FAMOTIDINE 20 MG/1
TABLET, FILM COATED ORAL
Qty: 30 TAB | Refills: 3 | Status: SHIPPED | OUTPATIENT
Start: 2018-10-18 | End: 2019-07-13 | Stop reason: SDUPTHER

## 2018-10-18 RX ORDER — GUAIFENESIN 100 MG/5ML
81 LIQUID (ML) ORAL DAILY
Qty: 90 TAB | Refills: 3 | Status: SHIPPED | OUTPATIENT
Start: 2018-10-18 | End: 2019-03-07 | Stop reason: SDUPTHER

## 2018-10-18 NOTE — PATIENT INSTRUCTIONS
Atopic Dermatitis: Care Instructions  Your Care Instructions  Atopic dermatitis (also called eczema) is a skin problem that causes intense itching and a red, raised rash. In severe cases, the rash develops clear fluid-filled blisters. The rash is not contagious. People with this condition seem to have very sensitive immune systems that are likely to react to things that cause allergies. The immune system is the body's way of fighting infection. There is no cure for atopic dermatitis, but you may be able to control it with care at home. Follow-up care is a key part of your treatment and safety. Be sure to make and go to all appointments, and call your doctor if you are having problems. It's also a good idea to know your test results and keep a list of the medicines you take. How can you care for yourself at home? · Use moisturizer at least twice a day. · If your doctor prescribes a cream, use it as directed. If your doctor prescribes other medicine, take it exactly as directed. · Wash the affected area with water only. Soap can make dryness and itching worse. Pat dry. · Apply a moisturizer after bathing. Use a cream such as Lubriderm, Moisturel, or Cetaphil that does not irritate the skin or cause a rash. Apply the cream while your skin is still damp after lightly drying with a towel. · Use cold, wet cloths to reduce itching. · Keep cool, and stay out of the sun. · If itching affects your normal activities, an over-the-counter antihistamine, such as diphenhydramine (Benadryl) or loratadine (Claritin) may help. Read and follow all instructions on the label. When should you call for help? Call your doctor now or seek immediate medical care if:    · Your rash gets worse and you have a fever.     · You have new blisters or bruises, or the rash spreads and looks like a sunburn.     · You have signs of infection, such as:  ? Increased pain, swelling, warmth, or redness.   ? Red streaks leading from the rash.  ? Pus draining from the rash. ? A fever.     · You have crusting or oozing sores.     · You have joint aches or body aches along with your rash.    Watch closely for changes in your health, and be sure to contact your doctor if:    · Your rash does not clear up after 2 to 3 weeks of home treatment.     · Itching interferes with your sleep or daily activities. Where can you learn more? Go to http://rose-mike.info/. Enter I558 in the search box to learn more about \"Atopic Dermatitis: Care Instructions. \"  Current as of: April 18, 2018  Content Version: 11.8  © 4159-9749 [employer]. Care instructions adapted under license by [employer] (which disclaims liability or warranty for this information). If you have questions about a medical condition or this instruction, always ask your healthcare professional. [address] any warranty or liability for your use of this information.

## 2018-10-18 NOTE — PROGRESS NOTES
1. Have you been to the ER, urgent care clinic since your last visit? Hospitalized since your last visit? [facility] for vomiting/stomach pains    2. Have you seen or consulted any other health care providers outside of the [address] [place] [address] since your last visit? Including any pap smears or colon screening. [facility] about 2 months    Reviewed record in preparation for visit and have necessary documentation    Pt did not bring medication to office visit for review    Information was offered to pt on Advanced Directives, Living Will but pt states she has one and will bring in for our records  Opportunity was given for questions    Goals that were addressed and/or need to be completed during or after this appointment include   Health Maintenance Due   Topic Date Due    DTaP/Tdap/Td series (1 - Tdap) 02/22/2002    Influenza Age 5 to Adult  08/01/2018     Body mass index is 25.46 kg/m².

## 2018-10-18 NOTE — PROGRESS NOTES
Nisha Baltazar  41 y.o. female  1980  [address]   [id number]     [facility]: Progress Note       Encounter Date: 10/18/2018    Chief Complaint   Patient presents with    Rash     x 3 days, itches     History of Present Illness   Nisha Baltazar is a 41 y.o. female who presents to clinic today for:    Rash-Skin flare started 3 days ago; was told in the past that she had eczema. She states she does take long hot showers. She tried treatment with baby oil & cocco butter after her showers with no relief. She uses Caress body wash and safeguard soap. She states that the rash does itch. Denies breaks in skin, bleeding and drainage. Health Maintenance    Health Maintenance Due   Topic Date Due    DTaP/Tdap/Td series (1 - Tdap) 02/22/2002     Review of Systems   Review of Systems   Constitutional: Negative. HENT: Negative. Eyes: Negative. Respiratory: Negative. Cardiovascular: Negative. Gastrointestinal: Negative. Genitourinary: Negative. Musculoskeletal: Negative. Skin: Positive for itching and rash. Neurological: Negative. Endo/Heme/Allergies: Negative. Psychiatric/Behavioral: Negative. Vitals/Objective:     Vitals:    10/18/18 1349   BP: 151/87   Pulse: 75   Resp: 18   Temp: 98.6 °F (37 °C)   TempSrc: Oral   SpO2: 100%   Weight: 153 lb (69.4 kg)   Height: 5' 5\" (1.651 m)     Body mass index is 25.46 kg/m². Physical Exam   Constitutional: She is oriented to person, place, and time. She is cooperative. Cardiovascular: Normal rate, regular rhythm, normal heart sounds and normal pulses. Pulmonary/Chest: Effort normal and breath sounds normal.   Musculoskeletal:   Left side weakness and mild contracture from CVA. Neurological: She is alert and oriented to person, place, and time. Skin: Skin is warm, dry and intact. Rash noted. Psychiatric: She has a normal mood and affect.  Her speech is normal and behavior is normal. Judgment and thought content normal. Cognition and memory are normal.       No results found for this or any previous visit (from the past 24 hour(s)). Assessment and Plan:   1. Atopic dermatitis, unspecified type    - ammonium lactate (AMLACTIN) 12 % topical cream; Apply  to affected area two (2) times a day for 7 days. rub in to affected areas well  Dispense: 280 g; Refill: 3  - triamcinolone acetonide (KENALOG) 0.1 % topical cream; Apply  to affected area two (2) times a day for 7 days. use thin layer. Apply neck, arms and chest.  Dispense: 15 g; Refill: 3    2. Other eczema    - ammonium lactate (AMLACTIN) 12 % topical cream; Apply  to affected area two (2) times a day for 7 days. rub in to affected areas well  Dispense: 280 g; Refill: 3  - triamcinolone acetonide (KENALOG) 0.1 % topical cream; Apply  to affected area two (2) times a day for 7 days. use thin layer. Apply neck, arms and chest.  Dispense: 15 g; Refill: 3    Discussed changing to Dove sensitive soap, taking 3 min luke warm  showers and not using the body washes due to the alcohol and fragrance ingredients. Discuss that the skin has to be hydrated and be extra vigilant due to the upcoming winter months. Discussed using lotions in a tube or tub recommended Aquafor if unable to get prescribed topical cream.     I have discussed the diagnosis with the patient and the intended plan as seen in the above orders. she has expressed understanding. The patient has received an after-visit summary and questions were answered concerning future plans. I have discussed medication side effects and warnings with the patient as well. Electronically Signed: Tino Mesa NP     History/Allergies   Patients past medical, surgical and family histories were reviewed and updated.     Past Medical History:   Diagnosis Date    Depression     GERD (gastroesophageal reflux disease)     Headache     Hypertension     Stroke [facility]) 11/2017 2017      Past Surgical History:   Procedure Laterality Date    CARDIAC SURG PROCEDURE UNLIST  2018    implantable loop recorder for A fib    HX DILATION AND CURETTAGE      D & C     Family History   Problem Relation Age of Onset    Schizophrenia Mother     Depression Mother     Anxiety Mother     Asthma Mother     Crohn's Disease Mother     Stroke Father     Hypertension Father     No Known Problems Sister     No Known Problems Brother     Other Son         pyloric stenosis    Cancer Maternal Grandfather         Lung    Other Paternal Grandfather         Aneursym    Colon Cancer Maternal Uncle     Cancer Maternal Uncle         Brain cancer     Social History     Socioeconomic History    Marital status: SINGLE     Spouse name: Not on file    Number of children: Not on file    Years of education: Not on file    Highest education level: Not on file   Social Needs    Financial resource strain: Not on file    Food insecurity - worry: Not on file    Food insecurity - inability: Not on file   [employer] needs - medical: Not on file   [employer] needs - non-medical: Not on file   Occupational History    Not on file   Tobacco Use    Smoking status: Former Smoker     Packs/day: 1.80     Years: 10.00     Pack years: 18.00     Types: Cigarettes    Smokeless tobacco: Never Used   Substance and Sexual Activity    Alcohol use: No    Drug use: No    Sexual activity: Not on file   Other Topics Concern    Not on file   Social History Narrative    Not on file         Allergies   Allergen Reactions    Carvedilol Hives    Tramadol Nausea Only     headaches       Disposition     Follow-up Disposition:  Return if symptoms worsen or fail to improve.     Future Appointments   Date Time Provider Norman Sandra   10/25/2018  9:50 AM Jacinto Rice MD [facility] SCHED            Current Medications after this visit     Current Outpatient Medications   Medication Sig    ammonium lactate (AMLACTIN) 12 % topical cream Apply  to affected area two (2) times a day for 7 days. rub in to affected areas well    triamcinolone acetonide (KENALOG) 0.1 % topical cream Apply  to affected area two (2) times a day for 7 days. use thin layer. Apply neck, arms and chest.    hydroCHLOROthiazide (HYDRODIURIL) 25 mg tablet TAKE 1 TABLET BY MOUTH DAILY    famotidine (PEPCID) 20 mg tablet TAKE 1 TABLET BY MOUTH NIGHTLY    atenolol (TENORMIN) 50 mg tablet TAKE 1 TABLET BY MOUTH DAILY    atorvastatin (LIPITOR) 40 mg tablet Take 1 Tab by mouth daily.  lisinopril (PRINIVIL, ZESTRIL) 10 mg tablet TAKE 1 TABLET BY MOUTH NIGHTLY    omeprazole (PRILOSEC) 20 mg capsule Take 1 Cap by mouth daily.  escitalopram oxalate (LEXAPRO) 20 mg tablet Take 1 Tab by mouth daily.  aspirin 81 mg chewable tablet Take 1 Tab by mouth daily. No current facility-administered medications for this visit.       Medications Discontinued During This Encounter   Medication Reason    triamcinolone acetonide (KENALOG) 0.1 % topical cream Reorder

## 2018-10-19 ENCOUNTER — TELEPHONE (OUTPATIENT)
Dept: FAMILY MEDICINE CLINIC | Age: 38
End: 2018-10-19

## 2018-10-19 NOTE — TELEPHONE ENCOUNTER
There was no script there for the lotion. She says you showed it to her and that her insurance would pay for it. But the two creams were there but no lotion. [facility] states it was not there.

## 2018-10-19 NOTE — TELEPHONE ENCOUNTER
Called and spoke to patient. Advised her per Mark Weldon NP:  Please call the patient and let her know that the insurance covered the kenalog (steroid) and ammonium lactate (cream). I ordered the ammonium lactate because insurance does not cover lotions. I recommend Aquafor lotion for her skin and that is OTC. Pt verbalized understanding.

## 2018-10-25 ENCOUNTER — OFFICE VISIT (OUTPATIENT)
Dept: FAMILY MEDICINE CLINIC | Age: 38
End: 2018-10-25

## 2018-10-25 VITALS
TEMPERATURE: 97 F | OXYGEN SATURATION: 97 % | BODY MASS INDEX: 26.16 KG/M2 | HEART RATE: 61 BPM | HEIGHT: 65 IN | RESPIRATION RATE: 16 BRPM | WEIGHT: 157 LBS | SYSTOLIC BLOOD PRESSURE: 153 MMHG | DIASTOLIC BLOOD PRESSURE: 92 MMHG

## 2018-10-25 DIAGNOSIS — F39 MOOD DISORDER (HCC): Primary | ICD-10-CM

## 2018-10-25 DIAGNOSIS — G89.29 CHRONIC NEUROPATHIC PAIN: ICD-10-CM

## 2018-10-25 DIAGNOSIS — M79.2 CHRONIC NEUROPATHIC PAIN: ICD-10-CM

## 2018-10-25 DIAGNOSIS — Z86.73 HISTORY OF STROKE: ICD-10-CM
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
## 2018-10-25 DIAGNOSIS — N83.209 CYST OF OVARY, UNSPECIFIED LATERALITY: ICD-10-CM

## 2018-10-25 DIAGNOSIS — R53.81 DEBILITY: ICD-10-CM

## 2018-10-25 DIAGNOSIS — G81.94 LEFT HEMIPARESIS (HCC): ICD-10-CM

## 2018-10-25 DIAGNOSIS — I10 ESSENTIAL HYPERTENSION: ICD-10-CM

## 2018-10-25 DIAGNOSIS — Z23 ENCOUNTER FOR IMMUNIZATION: ICD-10-CM

## 2018-10-25 RX ORDER — AMITRIPTYLINE HYDROCHLORIDE 50 MG/1
50 TABLET, FILM COATED ORAL
Qty: 30 TAB | Refills: 3 | Status: SHIPPED | OUTPATIENT
Start: 2018-10-25 | End: 2019-02-11

## 2018-10-25 RX ORDER — GABAPENTIN 800 MG/1
TABLET ORAL 3 TIMES DAILY
COMMUNITY
End: 2019-02-11

## 2018-10-25 NOTE — PROGRESS NOTES
1. Have you been to the ER, urgent care clinic since your last visit? Hospitalized since your last visit? no 
 
2. Have you seen or consulted any other health care providers outside of the [facility] since your last visit? Include any pap smears or colon screening. yes Reviewed record in preparation for visit and have obtained necessary documentation. Patient did not bring medications to visit for review. Information provided on Advanced Directive, Living Will. Body mass index is 26.13 kg/m². Health Maintenance Due Topic Date Due  
 DTaP/Tdap/Td series (1 - Tdap) 02/22/2002

## 2018-10-25 NOTE — PROGRESS NOTES
CC: Follow-up HPI: Pt is a 41 y.o. female who presents for follow-up. She is requesting a referral to Psychology at [facility]. She is also interested in seeing Psychiatry. She describes her moods as a roller coaster and she will suddenly feel very angry or tearful. She had a stroke about a year ago and never had these problems before that. She is no longer able to do the activities she used to do to relax because of her deficits from the stroke (ie she is no longer able to focus on reading and can't exercise because of pain). She denies SI and HI. She does not think the Lexapro was ever helpful. She continues to have issues sleeping as well and wakes up at 4:30AM every day. She has also been having panic attacks where she will feel like her heart races and she gets shortness of breath. She is inquiring about pain medication for her left side again. She states her Neurologist suggested that she see Pain Management because he does not think the neuropathic pain is going to improve. She has been having issues with balance related to her stroke and resulting chronic left hemiparesis. She has fallen multiple times and frequently drops things as well. She uses a cane but still has some instability and was hoping to try a four-pronged cane, which I believe will greatly improve her mobility and decrease her risk for falls. She also has a cyst on her ovary (states she was told about this 8 years ago and it was recently seen on a CT scan at an outside hospital, no records available). Past Medical History:  
Diagnosis Date  Depression  GERD (gastroesophageal reflux disease)  Headache  Hypertension  Stroke [facility]) 11/2017 2017 Family History Problem Relation Age of Onset [facility] Schizophrenia Mother  Depression Mother  Anxiety Mother  Asthma Mother  Crohn's Disease Mother  Stroke Father  Hypertension Father  No Known Problems Sister  No Known Problems Brother  Other Son   
     pyloric stenosis  Cancer Maternal Grandfather Lung  Other Paternal Grandfather Aneursym  Colon Cancer Maternal Uncle  Cancer Maternal Uncle Brain cancer Social History Tobacco Use  Smoking status: Former Smoker Packs/day: 1.80 Years: 10.00 Pack years: 18.00 Types: Cigarettes  Smokeless tobacco: Never Used Substance Use Topics  Alcohol use: No  
 Drug use: No  
 
 
ROS: 
Positive only when bolded Constitutional: F/C, changes in weight Respiratory: SOB, wheezing, cough Cardiovascular: CP, palpitations Hematologic/lymphatic: BRYAN Musculoskeletal: Myalgias, arthralgias Neurological: Changes in gait (multiple falls) PE: 
Visit Vitals BP (!) 153/92 (BP 1 Location: Right arm, BP Patient Position: Sitting) Pulse 61 Temp 97 °F (36.1 °C) (Oral) Resp 16 Ht 5' 5\" (1.651 m) Wt 157 lb (71.2 kg) LMP 10/03/2018 SpO2 97% BMI 26.13 kg/m² Gen: Pt sitting in chair, in NAD Head: Normocephalic, atraumatic Eyes: Sclera anicteric, EOM grossly intact, PERRL Throat: MMM, normal lips, tongue and gums Neck: Supple CVS: Normal S1, S2, no m/r/g Resp: CTAB, no wheezes or rales Extrem: Atraumatic, no cyanosis or edema Pulses: 2+ Skin: Warm, dry Neuro: Alert, oriented, appropriate. Gait unsteady, somewhat improved with single-leg cane but pt still has a degree of unsteadiness. A/P: Pt is a 41 y.o. female who presents for follow-up multiple issues. 1. Mood disorder/Insomnia: Advised pt likely related to changes in her brain from stroke. Lexapro not helping. 
- Taper Lexapro off. Instructions given and reviewed with patient 
- Start Elavil 50mg QHS after Lexapro finished (concern that cross-taper would be too confusing) - Hydroxyzine for prn use for panic attacks - Referral to Psychology - Referral to Psychiatry 2. Chronic pain related to prior stroke: Long discussion with pt regarding her course and anticipated outcomes. At this point she is almost a year out from stroke and I agree with her Neurologist that she will likely have some degree of pain for the rest of her life, although I believe we can find ways to manage it. - Hoping the Elavil will help with this as well. - Referral to Pain Management 3. Falls: She is already working with PT/OT. Will place order for home shower chair and 4-pronged cane to help with balance and decrease fall risk. 4. Ovarian cyst: Discussed with pt that ovarian cysts are common in women of reproductive potential and the cyst that was seen on her prior imaging could very possibly be physiologic. Offered US for further evaluation but she is very concerned and would prefer to see a Gynecologist 
- Referral to GYN 
 
5. Elevated BP: She was stressed and upset during this appt (and on 10/18 when she was upset about being scheduled with the wrong provider and left), which could be contributing. RTC in 4 weeks for recheck. Flu shot today RTC in 4 weeks for follow-up, or sooner prn Discussed diagnoses in detail with patient. Medication risks/benefits/side effects discussed with patient. All of the patient's questions were addressed. The patient understands and agrees with our plan of care. The patient knows to call back if they are unsure of or forget any changes we discussed today or if the symptoms change. The patient received an After-Visit Summary which contains VS, orders, medication list and allergy list. This can be used as a \"mini-medical record\" should they have to seek medical care while out of town. Current Outpatient Medications on File Prior to Visit Medication Sig Dispense Refill  gabapentin (NEURONTIN) 800 mg tablet Take  by mouth three (3) times daily.     
 hydroCHLOROthiazide (HYDRODIURIL) 25 mg tablet TAKE 1 TABLET BY MOUTH DAILY 30 Tab 3  
  famotidine (PEPCID) 20 mg tablet TAKE 1 TABLET BY MOUTH NIGHTLY 30 Tab 3  
 atenolol (TENORMIN) 50 mg tablet TAKE 1 TABLET BY MOUTH DAILY 30 Tab 3  
 atorvastatin (LIPITOR) 40 mg tablet Take 1 Tab by mouth daily. 30 Tab 3  
 lisinopril (PRINIVIL, ZESTRIL) 10 mg tablet TAKE 1 TABLET BY MOUTH NIGHTLY 30 Tab 3  
 omeprazole (PRILOSEC) 20 mg capsule Take 1 Cap by mouth daily. 30 Cap 3  
 escitalopram oxalate (LEXAPRO) 20 mg tablet Take 1 Tab by mouth daily. 30 Tab 3  
 aspirin 81 mg chewable tablet Take 1 Tab by mouth daily. 90 Tab 3  
 ammonium lactate (AMLACTIN) 12 % topical cream Apply  to affected area two (2) times a day for 7 days. rub in to affected areas well 280 g 3  
 triamcinolone acetonide (KENALOG) 0.1 % topical cream Apply  to affected area two (2) times a day for 7 days. use thin layer. Apply neck, arms and chest. 15 g 3 No current facility-administered medications on file prior to visit.

## 2018-10-25 NOTE — PATIENT INSTRUCTIONS
For the next week cut your Lexapro in half and take one tab daily. After you finish the Lexapro, you can start taking the new prescription for Elavil (amitriptiline). The Elavil should help with your mood, sleep and pain. I have put in a referral for Psychiatry and Psychology for [facility], Our office will work on this and get in touch with you with more details soon. I have put in a referral to Gynecology for you to talk about the cyst. 
 
I have put in a referral to Pain Management for you to discuss other options to use for your left sided pain from the stroke. I will put in the orders for you to get a shower chair and a four-pronged cane. Neuropathic Pain: Care Instructions Your Care Instructions Neuropathic pain is caused by pressure on or damage to your nerves. It's often simply called nerve pain. Some people feel this type of pain all the time. For others, it comes and goes. Diabetes, shingles, or an injury can cause nerve pain. Many people say the pain feels sharp, burning, or stabbing. But some people feel it as a dull ache. In some cases, it makes your skin very sensitive. So touch, pressure, and other sensations that did not hurt before may now cause pain. It's important to know that this kind of pain is real and can affect your quality of life. It's also important to know that treatment can help. Treatment includes pain medicines, exercise, and physical therapy. Medicines can help reduce the number of pain signals that travel over the nerves. This can make the painful areas less sensitive. It can also help you sleep better and improve your mood. But medicines are only one part of successful treatment. Most people do best with more than one kind of treatment. Your doctor may recommend that you try cognitive-behavioral therapy and stress management.  Or, if needed, you may decide to try to quit smoking, lower your blood pressure, or better control blood sugar. These kinds of healthy changes can also make a difference. If you feel that your treatment is not working, talk to your doctor. And be sure to tell your doctor if you think you might be depressed or anxious. These are common problems that can also be treated. Follow-up care is a key part of your treatment and safety. Be sure to make and go to all appointments, and call your doctor if you are having problems. It's also a good idea to know your test results and keep a list of the medicines you take. How can you care for yourself at home? · Be safe with medicines. Read and follow all instructions on the label. ? If the doctor gave you a prescription medicine for pain, take it as prescribed. ? If you are not taking a prescription pain medicine, ask your doctor if you can take an over-the-counter medicine. · Save hard tasks for days when you have less pain. Follow a hard task with an easy task. And remember to take breaks. · Relax, and reduce stress. You may want to try deep breathing or meditation. These can help. · Keep moving. Gentle, daily exercise can help reduce pain. Your doctor or physical therapist can tell you what type of exercise is best for you. This may include walking, swimming, and stationary biking. It may also include stretches and range-of-motion exercises. · Try heat, cold packs, and massage. · Get enough sleep. Constant pain can make you more tired. If the pain makes it hard to sleep, talk with your doctor. · Think positively. Your thoughts can affect your pain. Do fun things to distract yourself from the pain. See a movie, read a book, listen to music, or spend time with a friend. · Keep a pain diary. Try to write down how strong your pain is and what it feels like. Also try to notice and write down how your moods, thoughts, sleep, activities, and medicine affect your pain.  These notes can help you and your doctor find the best ways to treat your pain. Reducing constipation caused by pain medicine Pain medicines often cause constipation. To reduce constipation: 
· Include fruits, vegetables, beans, and whole grains in your diet each day. These foods are high in fiber. · Drink plenty of fluids, enough so that your urine is light yellow or clear like water. If you have kidney, heart, or liver disease and have to limit fluids, talk with your doctor before you increase the amount of fluids you drink. · Get some exercise every day. Build up slowly to 30 to 60 minutes a day on 5 or more days of the week. · Take a fiber supplement, such as Citrucel or Metamucil, every day if needed. Read and follow all instructions on the label. · Schedule time each day for a bowel movement. Having a daily routine may help. Take your time and do not strain when having a bowel movement. · Ask your doctor about a laxative. The goal is to have one easy bowel movement every 1 to 2 days. Do not let constipation go untreated for more than 3 days. When should you call for help? Call your doctor now or seek immediate medical care if: 
  · You feel sad, anxious, or hopeless for more than a few days. This could mean you are depressed. Depression is common in people who have a lot of pain. But it can be treated.  
  · You have trouble with bowel movements, such as: 
? No bowel movement in 3 days. ? Blood in the anal area, in your stool, or on the toilet paper. ? Diarrhea for more than 24 hours.  
 Watch closely for changes in your health, and be sure to contact your doctor if: 
  · Your pain is getting worse.  
  · You can't sleep because of pain.  
  · You are very worried or anxious about your pain.  
  · You have trouble taking your pain medicine.  
  · You have any concerns about your pain medicine or its side effects.  
  · You have vomiting or cramps for more than 2 hours. Where can you learn more? Go to http://rose-mike.info/. Enter A653 in the search box to learn more about \"Neuropathic Pain: Care Instructions. \" Current as of: June 4, 2018 Content Version: 11.8 © 8221-3298 Healthwise, [employer]. Care instructions adapted under license by [employer] (which disclaims liability or warranty for this information). If you have questions about a medical condition or this instruction, always ask your healthcare professional. [place] [address] any warranty or liability for your use of this information.

## 2018-10-25 NOTE — LETTER
PRESCRIPTION FOR DME 
 
10/26/2018 11:18 AM 
 
Ms. Db Reddy [address] To Whom It May Concern: 
 
Db Reddy is currently under the care of Pauline Ba. Please provide her with a shower chair and four-pronged cane to help with her multiple falls, chronic debility and left sided hemiparesis related to her prior stroke. Diagnosis codes: R53.81, W19. XXXD, G81.94, Z80.78 If there are questions or concerns please have the patient contact our office.   
 
 
 
Sincerely, 
 
 
Marlee Barnett MD

## 2018-11-28 ENCOUNTER — OFFICE VISIT (OUTPATIENT)
Dept: OBGYN CLINIC | Age: 38
End: 2018-11-28

## 2018-11-28 VITALS
BODY MASS INDEX: 25.83 KG/M2 | SYSTOLIC BLOOD PRESSURE: 148 MMHG | DIASTOLIC BLOOD PRESSURE: 82 MMHG | HEIGHT: 65 IN | WEIGHT: 155 LBS

## 2018-11-28 DIAGNOSIS — N89.8 VAGINAL DISCHARGE: Primary | ICD-10-CM

## 2018-11-28 DIAGNOSIS — N83.202 CYST OF LEFT OVARY: ICD-10-CM

## 2018-11-28 DIAGNOSIS — I63.9 CEREBROVASCULAR ACCIDENT (CVA), UNSPECIFIED MECHANISM (HCC): ICD-10-CM

## 2018-11-28 DIAGNOSIS — Z86.19 HISTORY OF TRICHOMONAL VAGINITIS: ICD-10-CM

## 2018-11-28 DIAGNOSIS — R37 SEXUAL DYSFUNCTION: ICD-10-CM

## 2018-11-28 LAB — WET MOUNT POCT, WMPOCT: NORMAL

## 2018-11-28 NOTE — PROGRESS NOTES
[facility]  [email or web address]  [phone]    Benjy Rousseau MD, FACOG       OB/GYN Problem visit    Chief Complaint:   Chief Complaint   Patient presents with    Ovarian Cyst       History of Present Illness: This is a new problem being evaluated by this provider. The patient is a 41 y.o.  female who was referred by her PCP for ovarian cyst. She is also having vaginal discharge today. She reports the symptoms are is unchanged. Aggravating factors include none. Alleviating factors include none. Pt also reports sexual intercourse \"feeling different\" since stroke. Also c/o vaginal discharge on and off x several weeks. She was told she had an ov cysts in  at her BTL and had no follow up. She does not have other concerns. LMP: Patient's last menstrual period was 2018.     PFSH:  Past Medical History:   Diagnosis Date    Depression     GERD (gastroesophageal reflux disease)     Headache     Hypertension     [facility]roke [facility] 2017     Past Surgical History:   Procedure Laterality Date    CARDIAC SURG PROCEDURE UNLIST  2018    implantable loop recorder for A fib    HX DILATION AND CURETTAGE      D & C     Family History   Problem Relation Age of Onset    Schizophrenia Mother     Depression Mother     Anxiety Mother     Asthma Mother     Crohn's Disease Mother     Stroke Father     Hypertension Father     No Known Problems Sister     No Known Problems Brother     Other Son         pyloric stenosis    Cancer Maternal Grandfather         Lung    Other Paternal Grandfather         Aneursym    Colon Cancer Maternal Uncle     Cancer Maternal Uncle         Brain cancer     Social History     Tobacco Use    Smoking status: Former Smoker     Packs/day: 1.80     Years: 10.00     Pack years: 18.00     Types: Cigarettes    Smokeless tobacco: Never Used   Substance Use Topics    Alcohol use: No    Drug use: Yes     Types: Marijuana Allergies   Allergen Reactions    Carvedilol Hives    Tramadol Nausea Only     Headaches  Patient states not allergic     Current Outpatient Medications   Medication Sig    gabapentin (NEURONTIN) 800 mg tablet Take  by mouth three (3) times daily.  amitriptyline (ELAVIL) 50 mg tablet Take 1 Tab by mouth nightly.  hydroCHLOROthiazide (HYDRODIURIL) 25 mg tablet TAKE 1 TABLET BY MOUTH DAILY    famotidine (PEPCID) 20 mg tablet TAKE 1 TABLET BY MOUTH NIGHTLY    atenolol (TENORMIN) 50 mg tablet TAKE 1 TABLET BY MOUTH DAILY    atorvastatin (LIPITOR) 40 mg tablet Take 1 Tab by mouth daily.  lisinopril (PRINIVIL, ZESTRIL) 10 mg tablet TAKE 1 TABLET BY MOUTH NIGHTLY    omeprazole (PRILOSEC) 20 mg capsule Take 1 Cap by mouth daily.  aspirin 81 mg chewable tablet Take 1 Tab by mouth daily. No current facility-administered medications for this visit. Review of Systems:  History obtained from the patient  Constitutional: negative for fevers, chills and weight loss  ENT ROS: negative for - hearing change, oral lesions or visual changes  Respiratory: negative for cough, wheezing or dyspnea on exertion  Cardiovascular: negative for chest pain, irregular heart beats, exertional chest pressure/discomfort  Gastrointestinal: negative for dysphagia, nausea and vomiting  Genito-Urinary ROS:  see HPI  Inteument/breast: negative for rash, breast lump and nipple discharge  Musculoskeletal:negative for stiff joints, neck pain and muscle weakness  Endocrine ROS: negative for - breast changes, galactorrhea or temperature intolerance  Hematological and Lymphatic ROS: negative for - blood clots, bruising or swollen lymph nodes    Physical Exam:  Visit Vitals  /82   Ht 5' 5\" (1.651 m)   Wt 155 lb (70.3 kg)   BMI 25.79 kg/m²       GENERAL: alert, well appearing, and in no distress  HEAD: normocephalic, atraumatic.    PULM: clear to auscultation, no wheezes, rales or rhonchi, symmetric air entry   COR: normal rate and regular rhythm, S1 and S2 normal   ABDOMEN: soft, nontender, nondistended, no masses or organomegaly   EGBUS: no lesions, no inflammation, no masses  VULVA: normal appearing vulva with no masses, tenderness or lesions  VAGINA: normal appearing vagina with normal color, no lesions, white and thin discharge  CERVIX: normal appearing cervix without discharge or lesions, non tender  UTERUS: uterus is normal size, shape, consistency and nontender   ADNEXA: normal adnexa in size, nontender and no masses  NEURO: alert, oriented, normal speech    Assessment:  Encounter Diagnoses   Name Primary?  Vaginal discharge Yes    History of trichomonal vaginitis     Comment: on pap, possible false positive    Cyst of left ovary     Comment: in past    Sexual dysfunction     Cerebrovascular accident (CVA), unspecified mechanism ([facility] [address]        Plan:  The patient is advised that she should contact the office if she does not note improvement or if symptoms recur  Recommend follow up with PCP for non-gynecologic complaints and chronic medical problems. She should contact our office with any questions or concerns  She could keep her routine annual exam appointment. Disc nerve and muscle changes post stroke and interaction with orgasm/intercourse  PT referral to see if improvements can be made     We discussed potential causes of vaginal discharge/irritation. We discussed good vulvar hygiene. Recommended avoid vaginal irritants. Discussed use of mild soaps/detergents. Follow up if NI. We reviewed wet prep findings with the patient at her visit. Patient will be notified about swab results and prescription sent, if indicated. Disc risks of false positive tests. Disc typical hormonal ovarian cysts and possible hormonal discharge    Physician review of ultrasound performed by technician    UTERUS IS ANTEVERTED, NORMAL IN SIZE AND ECHOGENICITY. ENDOMETRIUM MEASURES 4-5MM IN THICKNESS.  NO EVIDENCE OF MASS OR ABNORMALITY SEEN  WITHIN THE ENDOMETRIAL CAVITY. RIGHT OVARY APPEARS WITHIN NORMAL LIMITS. LEFT OVARY APPEARS WITHIN NORMAL LIMITS. BOTH OVARIES APPEAR MULTIFOLLICULAR. MODERATE FREE FLUID SEEN IN THE CDS. Today's ultrasound report and images were reviewed and discussed with the patient. Please see images and imaging report entered by technician in PACS for more detail and progress note and diagnosis entered by MD.    Garrett Pollard MD      Reviewed US 2010  3.5 cm during pregnancy, left see report below    Reviewed prior pap 2018  +trich on pap  Reviewed prior gc/chl/trich swab 2018  Neg        XR Results (maximum last 3): Results from [place] encounter on 09/15/09   XR LUMBAR SPINE 2 OR 3 VIEWS    Narrative inal Report       ICD Codes / Adm. Diagnosis:    /   BACK PAIN  Examination:  L SPINE MAX 3 S  - [id number] - Sep 15 2009 12:02PM  Accession No:  [id number]  Reason:  REASON: PAIN      REPORT:  INDICATION:  See indication above. COMPARISON: None. FINDINGS: AP, lateral and spot lateral views of the lumbar spine demonstrate   normal alignment. The vertebral body heights and disc spaces are   well-preserved. There is no fracture, subluxation or other abnormality. IMPRESSION:  Normal lumbar spine. Interpreting/Reading Doctor: Norah Munson ([id number])  Transcribed: n/a on 09/15/2009  Approved: Norah Munson ([id number])  09/15/2009             Distribution:  Attending Doctor:  ED PHYSICIAN   Alternate Doctor:  ED PHYSICIAN            CT Results (maximum last 3): No results found for this or any previous visit. MRI Results (maximum last 3): No results found for this or any previous visit. Nuclear Medicine Results (maximum last 3): No results found for this or any previous visit. US Results (maximum last 3):      DEXA Results (maximum last 3): No results found for this or any previous visit. DARNELL Results (maximum last 3):   No results found for this or any previous visit.    IR Results (maximum last 3): No results found for this or any previous visit. VAS/US Results (maximum last 3): No results found for this or any previous visit. PET Results (maximum last 3): No results found for this or any previous visit.       Orders Placed This Encounter    [facility] VAGINITIS PLUS    AMB POC WET PREP (AKA STAIN, INTERPRET, WET MOUNT)       Results for orders placed or performed in visit on 11/28/18   AMB POC SMEAR, STAIN & INTERPRET, WET MOUNT   Result Value Ref Range    Wet mount (POC)      Narrative    BISI    Hypae: negative  Buds: negative    Wet Prep:  Trich: negative  Clue cells: negative  Hyphae: negative  Buds: negative  WBC's: normal

## 2018-11-28 NOTE — LETTER
11/28/2018 11:10 AM 
 
Ms. Waldron Nip [address]  Please excuse Mao Luis from work this morning due to [employer] appointment Sincerely, Roselia Bean MD

## 2018-11-28 NOTE — PATIENT INSTRUCTIONS
Vaginitis: Care Instructions  Your Care Instructions    Vaginitis is soreness or infection of the vagina. This common problem can cause itching and burning. And it can cause a change in vaginal discharge. Sometimes it can cause pain during sex. Vaginitis may be caused by bacteria, yeast, or other germs. Some infections that cause it are caught from a sexual partner. Bath products, spermicides, and douches can irritate the vagina too. Some women have this problem during and after menopause. A drop in estrogen levels during this time can cause dryness, soreness, and pain during sex. Your doctor can give you medicine to treat an infection. And home care may help you feel better. For certain types of infections, your sex partner must be treated too. Follow-up care is a key part of your treatment and safety. Be sure to make and go to all appointments, and call your doctor if you are having problems. It's also a good idea to know your test results and keep a list of the medicines you take. How can you care for yourself at home? · If your doctor prescribed antibiotics, take them as directed. Do not stop taking them just because you feel better. You need to take the full course of antibiotics. · Take your medicines exactly as prescribed. Call your doctor if you think you are having a problem with your medicine. · Do not eat or drink anything that has alcohol if you are taking metronidazole (Flagyl). · If you have a yeast infection, use over-the-counter products as your doctor tells you to. Or take medicine your doctor prescribes exactly as directed. · Wash your vaginal area daily with water. You also can use a mild, unscented soap if you want. · Do not use scented bath products. And do not use vaginal sprays or douches. · Put a washcloth soaked in cool water on the area to relieve itching. Or you can take cool baths.   · If you have dryness because of menopause, use estrogen cream or pills that your doctor prescribes. · Ask your doctor about when it is okay to have sex. · Use a personal lubricant before sex if you have dryness. Examples are Astroglide, K-Y Jelly, and Wet Lubricant Gel. · Ask your doctor if your sex partner also needs treatment. When should you call for help? Call your doctor now or seek immediate medical care if:    · You have a fever and pelvic pain.    Watch closely for changes in your health, and be sure to contact your doctor if:    · You have bleeding other than your period.     · You do not get better as expected. Where can you learn more? Go to http://rose-mike.info/. Enter G736 in the search box to learn more about \"Vaginitis: Care Instructions. \"  Current as of: May 15, 2018  Content Version: 11.8  © 2383-7785 Healthwise, Incorporated. Care instructions adapted under license by [employer] (which disclaims liability or warranty for this information). If you have questions about a medical condition or this instruction, always ask your healthcare professional. [address] any warranty or liability for your use of this information.

## 2018-11-28 NOTE — LETTER
11/28/2018 11:07 AM 
 
Ms. Nam [address]  Please excuse Eusebio Recinos from work today, due to taking his child's mother to doctor appointment today Sincerely, Abigail Black MD

## 2018-11-30 LAB
A VAGINAE DNA VAG QL NAA+PROBE: ABNORMAL SCORE
BVAB2 DNA VAG QL NAA+PROBE: ABNORMAL SCORE
C ALBICANS DNA VAG QL NAA+PROBE: NEGATIVE
C GLABRATA DNA VAG QL NAA+PROBE: NEGATIVE
C TRACH RRNA SPEC QL NAA+PROBE: NEGATIVE
MEGA1 DNA VAG QL NAA+PROBE: ABNORMAL SCORE
N GONORRHOEA RRNA SPEC QL NAA+PROBE: NEGATIVE
T VAGINALIS RRNA SPEC QL NAA+PROBE: POSITIVE

## 2018-12-05 NOTE — PROGRESS NOTES
Abnormal results. Please notify pt. +Trich  Rx: flagyl 2 grams po x1. Update FS. This is a sexually transmitted disease. Intimate/sexual partner(s) need to be treated by their MD/PCP/clinic. The patient should notify them, or she can contact the health department for assistance. Rec abstinence until patient and partner(s) are treated +1 week. I recommend consistent condom use to decrease STD in the future. I recommend returning to see me to reevaluate infection in about three months. Please schedule appointment. I also recommend testing for other STDs if it hasn't been done recently: such as HIV/hepatitis and syphilis. She can RTC for lab work, or we can test at follow up visit.

## 2019-01-22 ENCOUNTER — TELEPHONE (OUTPATIENT)
Dept: OBGYN CLINIC | Age: 39
End: 2019-01-22

## 2019-01-22 NOTE — TELEPHONE ENCOUNTER
LM for pt, pt can call back to discuss if I am free or can schedule consult to review in more detail.    Pls refer to [facility] or [facility] website re: trichomonas if helpful

## 2019-01-22 NOTE — TELEPHONE ENCOUNTER
Patient of TP. She is a stroke victim and is not understanding her abnormal results of her labs she received a letter for back in December, no matter what I do to explain. Patient claims that she is seen at [facility] and one test that she had done said + Trich and then they called and said it was a false positive. Then they tell her she has AIDS. She is wanting to speak directly to TP. She was very polite but is easily confused. She denies that her sexual partner has been reinfecting her or that he could have had anything from the beginning. Result Notes for [facility] VAGINITIS PLUS    Notes recorded by Brigido Kaufman LPN on 21/98/8739 at 5:14 PM EST  Not right number in chart is not right. Will send letter  ------    Notes recorded by Luis Armando Garcia MD on 12/4/2018 at 8:41 PM EST  Abnormal results. Please notify pt. +Trich  Rx: flagyl 2 grams po x1.  Update FS. This is a sexually transmitted disease. Intimate/sexual partner(s) need to be treated by their MD/PCP/clinic.    The patient should notify them, or she can contact the health department for assistance. Rec abstinence until patient and partner(s) are treated +1 week. I recommend consistent condom use to decrease STD in the future. I recommend returning to see me to reevaluate infection in about three months. Please schedule appointment. I also recommend testing for other STDs if it hasn't been done recently: such as HIV/hepatitis and syphilis. She can RTC for lab work, or we can test at follow up visit.

## 2019-01-28 ENCOUNTER — TELEPHONE (OUTPATIENT)
Dept: OBGYN CLINIC | Age: 39
End: 2019-01-28

## 2019-01-28 RX ORDER — METRONIDAZOLE 500 MG/1
TABLET ORAL
Qty: 4 TAB | Refills: 0 | Status: SHIPPED | OUTPATIENT
Start: 2019-01-28 | End: 2019-02-11

## 2019-01-28 NOTE — TELEPHONE ENCOUNTER
Patient aware of results and MD recommendations by phone. rx sent to patient's pharmacy. Patient explained about partner needing treatment and refraining from intercourse until 4 weeks after treatment.   Patient verbalized understanding and scheduled LEYLA and blood work for 3/6/2019

## 2019-01-28 NOTE — TELEPHONE ENCOUNTER
Notes recorded by Valentín Craig MD on 12/4/2018 at 8:41 PM EST  Abnormal results. Please notify pt. +Trich  Rx: flagyl 2 grams po x1.  Update FS. This is a sexually transmitted disease. Intimate/sexual partner(s) need to be treated by their MD/PCP/clinic.    The patient should notify them, or she can contact the health department for assistance. Rec abstinence until patient and partner(s) are treated +1 week. I recommend consistent condom use to decrease STD in the future. I recommend returning to see me to reevaluate infection in about three months. Please schedule appointment. I also recommend testing for other STDs if it hasn't been done recently: such as HIV/hepatitis and syphilis. She can RTC for lab work, or we can test at follow up visit.

## 2019-02-11 ENCOUNTER — OFFICE VISIT (OUTPATIENT)
Dept: FAMILY MEDICINE CLINIC | Age: 39
End: 2019-02-11

## 2019-02-11 VITALS
HEART RATE: 54 BPM | RESPIRATION RATE: 16 BRPM | WEIGHT: 155 LBS | HEIGHT: 65 IN | TEMPERATURE: 96.7 F | DIASTOLIC BLOOD PRESSURE: 86 MMHG | SYSTOLIC BLOOD PRESSURE: 139 MMHG | OXYGEN SATURATION: 100 % | BODY MASS INDEX: 25.83 KG/M2

## 2019-02-11 DIAGNOSIS — F33.1 MODERATE EPISODE OF RECURRENT MAJOR DEPRESSIVE DISORDER (HCC): Primary | ICD-10-CM

## 2019-02-11 DIAGNOSIS — J30.1 SEASONAL ALLERGIC RHINITIS DUE TO POLLEN: ICD-10-CM

## 2019-02-11 DIAGNOSIS — I10 ESSENTIAL HYPERTENSION: ICD-10-CM

## 2019-02-11 DIAGNOSIS — A59.01 TRICHOMONAS VAGINALIS (TV) INFECTION: ICD-10-CM

## 2019-02-11 DIAGNOSIS — E78.2 MIXED HYPERLIPIDEMIA: ICD-10-CM

## 2019-02-11 DIAGNOSIS — Z86.73 H/O: STROKE: ICD-10-CM

## 2019-02-11 RX ORDER — FLUTICASONE PROPIONATE 50 MCG
2 SPRAY, SUSPENSION (ML) NASAL DAILY
Qty: 1 BOTTLE | Refills: 3 | Status: SHIPPED | OUTPATIENT
Start: 2019-02-11 | End: 2022-01-07 | Stop reason: SDUPTHER

## 2019-02-11 RX ORDER — MONTELUKAST SODIUM 10 MG/1
10 TABLET ORAL DAILY
Qty: 30 TAB | Refills: 3 | Status: SHIPPED | OUTPATIENT
Start: 2019-02-11 | End: 2020-08-17

## 2019-02-11 RX ORDER — LORATADINE 10 MG/1
10 TABLET ORAL DAILY
Qty: 30 TAB | Refills: 3 | Status: SHIPPED | OUTPATIENT
Start: 2019-02-11 | End: 2019-10-24 | Stop reason: ALTCHOICE

## 2019-02-11 NOTE — PROGRESS NOTES
1. Have you been to the ER, urgent care clinic since your last visit? Hospitalized since your last visit? Yes When: 10/2019 and 12/2019 [facility] for stomach and ear 2. Have you seen or consulted any other health care providers outside of the [address] [place] [address] since your last visit? Include any pap smears or colon screening. Yes When: 11/2019-12/2019 Dr. Jose Geller, 2 appts Reviewed record in preparation for visit and have necessary documentation Pt did not bring medication to office visit for review Goals that were addressed and/or need to be completed during or after this appointment include Health Maintenance Due Topic Date Due  
 DTaP/Tdap/Td series (1 - Tdap) 02/22/2002

## 2019-02-11 NOTE — LETTER
2/11/2019 4:20 PM 
 
Ms. Mare Harper [address] [facility] Dear Ms. Silverman: I contacted [facility] to schedule you to be seen by their mental health department. I was informed that you need to call and schedule the appointment because they do a screening over the phone. The phone number to call and schedule the appointment is [phone]. If you have any questions, please contact our office. Sincerely, Ludwig Mcguire

## 2019-02-11 NOTE — PROGRESS NOTES
CC: Follow-up HPI: Pt is a 41 y.o. female who presents for follow-up. She is a poor historian 2/2 brain damage from prior stroke. She is concerned about a positive trichomonas test that she had done at her GYN office. She states that she picked up the Flagyl and took it and came here to have her partner tested and he was negative. She reports that he is absolutely the only person she has been sexually active with in many years and that they are infrequently active. Earlier this year she had a pap that reported trichomonas and a urine NAAT done at the same time that was negative for trich. She also had a reactive HIV screen that was negative on confirmation. She is in tears today trying to understand these results and wondering if the most recent trich could also be false positive. She reports she is no longer taking any medication for depression. She tried to make an appt with [facility] but was told they are not taking new patients. She had been seeing a Rehab Psychologist and is interested in going back there. She had been prescribed Elavil at her last visit but stopped after a few days for symptoms she is not able to describe well. She is inquiring about a glove for her left hand which has pain and cold chronically since her stroke. Chart review shows the paperwork for her cane and shower chair were sent after her last visit but she reports never being contacted by the DME company. She is also asking for some medication for seasonal allergies. She has rhinorrhea, congestions, sneezing and runny eyes and has taken some OTC medications that help but is nervous about doing this because she doesn't know how it will interact with her medications. Past Medical History:  
Diagnosis Date  Depression  GERD (gastroesophageal reflux disease)  Headache  Hypertension  Stroke [facility]) 11/2017 2017 Family History Problem Relation Age of Onset [facility] Schizophrenia Mother  Depression Mother  Anxiety Mother  Asthma Mother  Crohn's Disease Mother  Stroke Father  Hypertension Father  No Known Problems Sister  No Known Problems Brother  Other Son   
     pyloric stenosis  Cancer Maternal Grandfather Lung  Other Paternal Grandfather Aneursym  Colon Cancer Maternal Uncle  Cancer Maternal Uncle Brain cancer Social History Tobacco Use  Smoking status: Former Smoker Packs/day: 1.80 Years: 10.00 Pack years: 18.00 Types: Cigarettes  Smokeless tobacco: Never Used Substance Use Topics  Alcohol use: No  
 Drug use: Yes Types: Marijuana ROS: 
Positive only when bolded Constitutional: F/C, changes in weight Eyes: Itching/draining, changes in vision Ears, nose, mouth, throat, and face: Rhinorrhea, congestion, sore throat, ear pain Respiratory: SOB, wheezing, cough Hematologic/lymphatic: BRYAN Musculoskeletal: Myalgias, arthralgias (chronic, on L side of body since stroke) PE: 
Visit Vitals Ht 5' 5\" (1.651 m) Wt 155 lb (70.3 kg) BMI 25.79 kg/m² Gen: Pt sitting in chair, in NAD Head: Normocephalic, atraumatic Eyes: Sclera anicteric, EOM grossly intact, PERRL Throat: MMM, normal lips, tongue and gums Neck: Supple CVS: Normal S1, S2, no m/r/g Resp: CTAB, no wheezes or rales Extrem: Atraumatic, no cyanosis or edema Pulses: 2+ Skin: Warm, dry Neuro: Alert, oriented, appropriate A/P: Pt is a 41 y.o. female who presents for follow-up. Discussed results of nuswab with her. This is also an NAAT with high specificity, however I do understand her concern and frustration given previous false positive results.  She reports having taken the treatment and would like to be retested today.  
- CT/NG/Trich NAAT 
- Claritin, Flonase and Singulair for allergies as it sounds like she has already been doing an antihistamine and a nasal spray with partial improvement of symptoms - Will look into getting her the shower chair, four pronged cane and glove - Referral to Rachael Salas, who is actually a Psychologist in the department of PM&R for [facility] and I suspect may specialize in the care of patients after stroke - Lipid panel 
- CMP 
- RTC in 3 months for follow-up Discussed diagnoses in detail with patient. Medication risks/benefits/side effects discussed with patient. All of the patient's questions were addressed. The patient understands and agrees with our plan of care. The patient knows to call back if they are unsure of or forget any changes we discussed today or if the symptoms change. The patient received an After-Visit Summary which contains VS, orders, medication list and allergy list. This can be used as a \"mini-medical record\" should they have to seek medical care while out of town. Current Outpatient Medications on File Prior to Visit Medication Sig Dispense Refill  hydroCHLOROthiazide (HYDRODIURIL) 25 mg tablet TAKE 1 TABLET BY MOUTH DAILY 30 Tab 3  
 famotidine (PEPCID) 20 mg tablet TAKE 1 TABLET BY MOUTH NIGHTLY 30 Tab 3  
 atenolol (TENORMIN) 50 mg tablet TAKE 1 TABLET BY MOUTH DAILY 30 Tab 3  
 atorvastatin (LIPITOR) 40 mg tablet Take 1 Tab by mouth daily. 30 Tab 3  
 lisinopril (PRINIVIL, ZESTRIL) 10 mg tablet TAKE 1 TABLET BY MOUTH NIGHTLY 30 Tab 3  
 omeprazole (PRILOSEC) 20 mg capsule Take 1 Cap by mouth daily. 30 Cap 3  
 aspirin 81 mg chewable tablet Take 1 Tab by mouth daily. 90 Tab 3  
 metroNIDAZOLE (FLAGYL) 500 mg tablet Take 4 tabs (2grams) po now as a one time dose. 4 Tab 0  
 gabapentin (NEURONTIN) 800 mg tablet Take  by mouth three (3) times daily.  amitriptyline (ELAVIL) 50 mg tablet Take 1 Tab by mouth nightly. 30 Tab 3 No current facility-administered medications on file prior to visit.

## 2019-02-11 NOTE — PATIENT INSTRUCTIONS
1. I am going to get you back in with [facility] for therapy. 2. I am going to work on getting your shower chair, four-pronged cane, and glove for your hand. 3. I am going to re-check your trichomonas test and look into better answers for you there. 4. I am sending 3 medicines for allergies to the pharmacy for you. Allergies: Care Instructions Your Care Instructions Allergies occur when your body's defense system (immune system) overreacts to certain substances. The immune system treats a harmless substance as if it were a harmful germ or virus. Many things can cause this overreaction, including pollens, medicine, food, dust, animal dander, and mold. Allergies can be mild or severe. Mild allergies can be managed with home treatment. But medicine may be needed to prevent problems. Managing your allergies is an important part of staying healthy. Your doctor may suggest that you have allergy testing to help find out what is causing your allergies. When you know what things trigger your symptoms, you can avoid them. This can prevent allergy symptoms and other health problems. For severe allergies that cause reactions that affect your whole body (anaphylactic reactions), your doctor may prescribe a shot of epinephrine to carry with you in case you have a severe reaction. Learn how to give yourself the shot and keep it with you at all times. Make sure it is not . Follow-up care is a key part of your treatment and safety. Be sure to make and go to all appointments, and call your doctor if you are having problems. It's also a good idea to know your test results and keep a list of the medicines you take. How can you care for yourself at home? · If you have been told by your doctor that dust or dust mites are causing your allergy, decrease the dust around your bed: 
? Wash sheets, pillowcases, and other bedding in hot water every week. ? Use dust-proof covers for pillows, duvets, and mattresses. Avoid plastic covers because they tear easily and do not \"breathe. \" Wash as instructed on the label. ? Do not use any blankets and pillows that you do not need. ? Use blankets that you can wash in your washing machine. ? Consider removing drapes and carpets, which attract and hold dust, from your bedroom. · If you are allergic to house dust and mites, do not use home humidifiers. Your doctor can suggest ways you can control dust and mites. · Look for signs of cockroaches. Cockroaches cause allergic reactions. Use cockroach baits to get rid of them. Then, clean your home well. Cockroaches like areas where grocery bags, newspapers, empty bottles, or cardboard boxes are stored. Do not keep these inside your home, and keep trash and food containers sealed. Seal off any spots where cockroaches might enter your home. · If you are allergic to mold, get rid of furniture, rugs, and drapes that smell musty. Check for mold in the bathroom. · If you are allergic to outdoor pollen or mold spores, use air-conditioning. Change or clean all filters every month. Keep windows closed. · If you are allergic to pollen, stay inside when pollen counts are high. Use a vacuum  with a HEPA filter or a double-thickness filter at least two times each week. · Stay inside when air pollution is bad. Avoid paint fumes, perfumes, and other strong odors. · Avoid conditions that make your allergies worse. Stay away from smoke. Do not smoke or let anyone else smoke in your house. Do not use fireplaces or wood-burning stoves. · If you are allergic to your pets, change the air filter in your furnace every month. Use high-efficiency filters. · If you are allergic to pet dander, keep pets outside or out of your bedroom. Old carpet and cloth furniture can hold a lot of animal dander. You may need to replace them. When should you call for help? Give an epinephrine shot if:   · You think you are having a severe allergic reaction.  
  · You have symptoms in more than one body area, such as mild nausea and an itchy mouth.  
 After giving an epinephrine shot call 911, even if you feel better. 
 Call 911 if: 
  · You have symptoms of a severe allergic reaction. These may include: 
? Sudden raised, red areas (hives) all over your body. ? Swelling of the throat, mouth, lips, or tongue. ? Trouble breathing. ? Passing out (losing consciousness). Or you may feel very lightheaded or suddenly feel weak, confused, or restless.  
  · You have been given an epinephrine shot, even if you feel better.  
 Call your doctor now or seek immediate medical care if: 
  · You have symptoms of an allergic reaction, such as: ? A rash or hives (raised, red areas on the skin). ? Itching. ? Swelling. ? Belly pain, nausea, or vomiting.  
 Watch closely for changes in your health, and be sure to contact your doctor if: 
  · You do not get better as expected. Where can you learn more? Go to http://rose-mike.info/. Enter I511 in the search box to learn more about \"Allergies: Care Instructions. \" Current as of: June 27, 2018 Content Version: 11.9 © 1429-2338 [employer]. Care instructions adapted under license by [employer] (which disclaims liability or warranty for this information). If you have questions about a medical condition or this instruction, always ask your healthcare professional. [place] [address] any warranty or liability for your use of this information.

## 2019-02-12 LAB
ALBUMIN SERPL-MCNC: 4.3 G/DL (ref 3.5–5.5)
ALBUMIN/GLOB SERPL: 1.8 {RATIO} (ref 1.2–2.2)
ALP SERPL-CCNC: 37 IU/L (ref 39–117)
ALT SERPL-CCNC: 12 IU/L (ref 0–32)
AST SERPL-CCNC: 16 IU/L (ref 0–40)
BILIRUB SERPL-MCNC: 0.2 MG/DL (ref 0–1.2)
BUN SERPL-MCNC: 10 MG/DL (ref 6–20)
BUN/CREAT SERPL: 10 (ref 9–23)
CALCIUM SERPL-MCNC: 9.3 MG/DL (ref 8.7–10.2)
CHLORIDE SERPL-SCNC: 103 MMOL/L (ref 96–106)
CHOLEST SERPL-MCNC: 141 MG/DL (ref 100–199)
CO2 SERPL-SCNC: 27 MMOL/L (ref 20–29)
CREAT SERPL-MCNC: 0.96 MG/DL (ref 0.57–1)
GLOBULIN SER CALC-MCNC: 2.4 G/DL (ref 1.5–4.5)
GLUCOSE SERPL-MCNC: 75 MG/DL (ref 65–99)
HDLC SERPL-MCNC: 70 MG/DL
LDLC SERPL CALC-MCNC: 60 MG/DL (ref 0–99)
POTASSIUM SERPL-SCNC: 3.9 MMOL/L (ref 3.5–5.2)
PROT SERPL-MCNC: 6.7 G/DL (ref 6–8.5)
SODIUM SERPL-SCNC: 144 MMOL/L (ref 134–144)
TRIGL SERPL-MCNC: 55 MG/DL (ref 0–149)
VLDLC SERPL CALC-MCNC: 11 MG/DL (ref 5–40)

## 2019-02-13 LAB
C TRACH RRNA SPEC QL NAA+PROBE: NEGATIVE
N GONORRHOEA RRNA SPEC QL NAA+PROBE: NEGATIVE
T VAGINALIS RRNA VAG QL NAA+PROBE: NEGATIVE

## 2019-02-19 PROBLEM — R26.89 IMPAIRMENT OF BALANCE: Status: ACTIVE | Noted: 2019-02-19

## 2019-02-19 PROBLEM — G81.94 LEFT HEMIPARESIS (HCC): Status: ACTIVE | Noted: 2019-02-19

## 2019-05-29 DIAGNOSIS — I10 ESSENTIAL HYPERTENSION: ICD-10-CM

## 2019-05-29 RX ORDER — HYDROCHLOROTHIAZIDE 25 MG/1
TABLET ORAL
Qty: 30 TAB | Refills: 3 | Status: SHIPPED | OUTPATIENT
Start: 2019-05-29 | End: 2019-12-10 | Stop reason: SDUPTHER

## 2019-06-10 ENCOUNTER — OFFICE VISIT (OUTPATIENT)
Dept: FAMILY MEDICINE CLINIC | Age: 39
End: 2019-06-10

## 2019-06-10 VITALS
HEART RATE: 71 BPM | WEIGHT: 156.6 LBS | SYSTOLIC BLOOD PRESSURE: 132 MMHG | BODY MASS INDEX: 26.09 KG/M2 | OXYGEN SATURATION: 98 % | TEMPERATURE: 97.5 F | RESPIRATION RATE: 18 BRPM | HEIGHT: 65 IN | DIASTOLIC BLOOD PRESSURE: 63 MMHG

## 2019-06-10 DIAGNOSIS — J34.89 SINUS PRESSURE: ICD-10-CM

## 2019-06-10 DIAGNOSIS — J02.9 SORE THROAT: ICD-10-CM

## 2019-06-10 DIAGNOSIS — J02.0 STREP PHARYNGITIS: Primary | ICD-10-CM

## 2019-06-10 DIAGNOSIS — R05.8 PRODUCTIVE COUGH: ICD-10-CM

## 2019-06-10 LAB
QUICKVUE INFLUENZA TEST: NEGATIVE
S PYO AG THROAT QL: POSITIVE
VALID INTERNAL CONTROL?: YES
VALID INTERNAL CONTROL?: YES

## 2019-06-10 RX ORDER — CODEINE PHOSPHATE AND GUAIFENESIN 10; 100 MG/5ML; MG/5ML
5 SOLUTION ORAL
Qty: 25 ML | Refills: 0 | Status: SHIPPED | OUTPATIENT
Start: 2019-06-10 | End: 2019-06-15

## 2019-06-10 RX ORDER — AMMONIUM LACTATE 12 G/100G
CREAM TOPICAL
Refills: 3 | COMMUNITY
Start: 2019-05-29

## 2019-06-10 RX ORDER — GABAPENTIN 800 MG/1
TABLET ORAL
Refills: 3 | COMMUNITY
Start: 2019-05-29 | End: 2019-07-25 | Stop reason: ALTCHOICE

## 2019-06-10 RX ORDER — TRIAMCINOLONE ACETONIDE 1 MG/G
CREAM TOPICAL
Refills: 3 | COMMUNITY
Start: 2019-05-29 | End: 2022-01-07 | Stop reason: SDUPTHER

## 2019-06-10 RX ORDER — AMOXICILLIN 500 MG/1
500 CAPSULE ORAL 2 TIMES DAILY
Qty: 20 CAP | Refills: 0 | Status: SHIPPED | OUTPATIENT
Start: 2019-06-10 | End: 2019-06-20

## 2019-06-10 NOTE — PROGRESS NOTES
Henrietta Jones  [address] [facility] y.o. female  1980  [place] 78  [id number]     [facility] Practice: Progress Note       Encounter Date: 6/10/2019    Chief Complaint   Patient presents with    Sore Throat    Cough     with cold chills     History of Present Illness   Henrietta Jones is a 8[address] [facility] y.o. female who presents to clinic today for:    Sore throat/cough- states she was at the hospital with her sister who was admitted last week; spent several nights with her and states the hospital room was cold and drafty. Symptoms-productive cough and chest congestion, chest pain from coughing, hot/cold, chills, sore throat, ear pain. She is in clinic wearing a tshirt, hoodie and a coat from being cold at the moment. Denies-N, V, D, rash, wheeze. Tolerating food and fluids. No sick contact exposure at home. Hx of strep throat in the past.    Health Maintenance    Health Maintenance Due   Topic Date Due    DTaP/Tdap/Td series (1 - Tdap) 02/22/2002     Review of Systems   Review of Systems   Constitutional: Positive for chills. HENT: Positive for congestion, ear pain, sinus pain and sore throat. Eyes: Negative. Respiratory: Positive for cough. Cardiovascular: Negative. Gastrointestinal: Negative. Genitourinary: Negative. Musculoskeletal: Negative. Skin: Negative. Neurological: Negative. Endo/Heme/Allergies: Negative. Psychiatric/Behavioral: Negative. Vitals/Objective:     Vitals:    06/10/19 0853   BP: 132/63   Pulse: 71   Resp: 18   Temp: 97.5 °F (36.4 °C)   TempSrc: Oral   SpO2: 98%   Weight: 156 lb 9.6 oz (71 kg)   Height: 5' 5\" (1.651 m)     Body mass index is 26.06 kg/m². Physical Exam   Constitutional: She is oriented to person, place, and time. She is cooperative. HENT:   Head: Normocephalic. Nose: Right sinus exhibits maxillary sinus tenderness and frontal sinus tenderness. Left sinus exhibits maxillary sinus tenderness and frontal sinus tenderness. Mouth/Throat: Uvula is midline. Mucous membranes are dry. Posterior oropharyngeal erythema present. Eyes: Conjunctivae and lids are normal.   Neck: Trachea normal, normal range of motion, full passive range of motion without pain and phonation normal. Neck supple. No thyroid mass and no thyromegaly present. Cardiovascular: Normal rate, regular rhythm, normal heart sounds and normal pulses. Pulmonary/Chest: Effort normal and breath sounds normal.   Lymphadenopathy:        Head (right side): Tonsillar adenopathy present. Head (left side): Tonsillar adenopathy present. She has cervical adenopathy. Neurological: She is alert and oriented to person, place, and time. Skin: Skin is warm, dry and intact. Psychiatric: She has a normal mood and affect. Her speech is normal and behavior is normal. Judgment and thought content normal. Cognition and memory are normal.       Recent Results (from the past 24 hour(s))   AMB POC RAPID STREP A    Collection Time: 06/10/19  9:11 AM   Result Value Ref Range    VALID INTERNAL CONTROL POC Yes     Group A Strep Ag Positive Negative   AMB POC RAPID INFLUENZA TEST    Collection Time: 06/10/19  9:15 AM   Result Value Ref Range    VALID INTERNAL CONTROL POC Yes     QuickVue Influenza test Negative Negative     Assessment and Plan:   1. Sore throat    - AMB POC RAPID STREP A  - AMB POC RAPID INFLUENZA TEST    2. Strep pharyngitis    - amoxicillin (AMOXIL) 500 mg capsule; Take 1 Cap by mouth two (2) times a day for 10 days. Dispense: 20 Cap; Refill: 0    3. Productive cough    - guaiFENesin-codeine (ROBITUSSIN AC) 100-10 mg/5 mL solution; Take 5 mL by mouth nightly for 5 days. Max Daily Amount: 5 mL. Indications: cough  Dispense: 25 mL; Refill: 0    4. Sinus pressure    - amoxicillin (AMOXIL) 500 mg capsule; Take 1 Cap by mouth two (2) times a day for 10 days. Dispense: 20 Cap; Refill: 0    Discussed positive strep and negative flu.  Discussed increasing fluids, rest, tylenol as needed, handwashing. Discussed compliance with antibiotic and changing her toothbrush in 48hours and at the end of treatment. Robitussin AC prescribed at night only for cough over the next 5 days. Discussed not visiting her sister in ICU for the next ~3 days and speak with her sister's nurse re: visitation. I have discussed the diagnosis with the patient and the intended plan as seen in the above orders. she has expressed understanding. The patient has received an after-visit summary and questions were answered concerning future plans. I have discussed medication side effects and warnings with the patient as well. Electronically Signed: London Nicolas NP     History/Allergies   Patients past medical, surgical and family histories were reviewed and updated.     Past Medical History:   Diagnosis Date    Depression     GERD (gastroesophageal reflux disease)     Headache     Hypertension     Neuropathic pain of forearm, left     Neuropathic pain of left lower extremity     Stroke [facility]) 11/2017 2017      Past Surgical History:   Procedure Laterality Date    CARDIAC SURG PROCEDURE UNLIST  2018    implantable loop recorder for A fib    HX DILATION AND CURETTAGE      D & C     Family History   Problem Relation Age of Onset    Schizophrenia Mother     Depression Mother     Anxiety Mother     Asthma Mother     Crohn's Disease Mother     Stroke Father     Hypertension Father     No Known Problems Sister     No Known Problems Brother     Other Son         pyloric stenosis    Cancer Maternal Grandfather         Lung    Other Paternal Grandfather         Aneursym    Colon Cancer Maternal Uncle     Cancer Maternal Uncle         Brain cancer     Social History     Socioeconomic History    Marital status: SINGLE     Spouse name: Not on file    Number of children: Not on file    Years of education: Not on file    Highest education level: Not on file   Occupational History  Not on file   Social Needs    Financial resource strain: Not on file    Food insecurity:     Worry: Not on file     Inability: Not on file    Transportation needs:     Medical: Not on file     Non-medical: Not on file   Tobacco Use    Smoking status: Former Smoker     Packs/day: 1.80     Years: 10.00     Pack years: 18.00     Types: Cigarettes    Smokeless tobacco: Never Used   Substance and Sexual Activity    Alcohol use: No    Drug use: Yes     Types: Marijuana    Sexual activity: Yes     Partners: Male     Birth control/protection: Surgical   Lifestyle    Physical activity:     Days per week: Not on file     Minutes per session: Not on file    Stress: Not on file   Relationships    Social connections:     Talks on phone: Not on file     Gets together: Not on file     Attends Sabianist service: Not on file     Active member of club or organization: Not on file     Attends meetings of clubs or organizations: Not on file     Relationship status: Not on file    Intimate partner violence:     Fear of current or ex partner: Not on file     Emotionally abused: Not on file     Physically abused: Not on file     Forced sexual activity: Not on file   Other Topics Concern    Not on file   Social History Narrative    Not on file         Allergies   Allergen Reactions    Carvedilol Hives    Tramadol Nausea Only     Headaches  Patient states not allergic       Disposition     Follow-up and Dispositions  ·   Return if symptoms worsen or fail to improve. No future appointments. Current Medications after this visit     Current Outpatient Medications   Medication Sig    triamcinolone acetonide (KENALOG) 0.1 % topical cream     ammonium lactate (AMLACTIN) 12 % topical cream     amoxicillin (AMOXIL) 500 mg capsule Take 1 Cap by mouth two (2) times a day for 10 days.  guaiFENesin-codeine (ROBITUSSIN AC) 100-10 mg/5 mL solution Take 5 mL by mouth nightly for 5 days. Max Daily Amount: 5 mL. Indications: cough    hydroCHLOROthiazide (HYDRODIURIL) 25 mg tablet TAKE 1 TABLET BY MOUTH DAILY    PRILOSEC OTC 20 mg tablet TAKE ONE TABLET BY MOUTH EVERY DAY    aspirin 81 mg chewable tablet CHEW AND SWALLOW ONE TABLET BY MOUTH EVERY DAY    montelukast (SINGULAIR) 10 mg tablet Take 1 Tab by mouth daily.  loratadine (CLARITIN) 10 mg tablet Take 1 Tab by mouth daily.  fluticasone (FLONASE) 50 mcg/actuation nasal spray 2 Sprays by Both Nostrils route daily.  famotidine (PEPCID) 20 mg tablet TAKE 1 TABLET BY MOUTH NIGHTLY    atenolol (TENORMIN) 50 mg tablet TAKE 1 TABLET BY MOUTH DAILY    atorvastatin (LIPITOR) 40 mg tablet Take 1 Tab by mouth daily.  lisinopril (PRINIVIL, ZESTRIL) 10 mg tablet TAKE 1 TABLET BY MOUTH NIGHTLY    gabapentin (NEURONTIN) 800 mg tablet      No current facility-administered medications for this visit. There are no discontinued medications.

## 2019-06-10 NOTE — PROGRESS NOTES
Chief Complaint   Patient presents with    Sore Throat    Cough     with cold chills     Visit Vitals  /63 (BP 1 Location: Right arm, BP Patient Position: Sitting)   Pulse 71   Temp 97.5 °F (36.4 °C) (Oral)   Resp 18   Ht 5' 5\" (1.651 m)   Wt 156 lb 9.6 oz (71 kg)   SpO2 98%   BMI 26.06 kg/m²     1. Have you been to the ER, urgent care clinic since your last visit? Hospitalized since your last visit? No    2. Have you seen or consulted any other health care providers outside of the [address] [place] [address] since your last visit? Include any pap smears or colon screening.  No    Reviewed record in preparation for visit and have necessary documentation  Pt did not bring medication to office visit for review  opportunity was given for questions  Goals that were addressed and/or need to be completed during or after this appointment include   Health Maintenance Due   Topic Date Due    DTaP/Tdap/Td series (1 - Tdap) 02/22/2002

## 2019-06-10 NOTE — PATIENT INSTRUCTIONS

## 2019-07-25 ENCOUNTER — OFFICE VISIT (OUTPATIENT)
Dept: FAMILY MEDICINE CLINIC | Age: 39
End: 2019-07-25

## 2019-07-25 VITALS
HEIGHT: 65 IN | TEMPERATURE: 98.5 F | DIASTOLIC BLOOD PRESSURE: 85 MMHG | SYSTOLIC BLOOD PRESSURE: 141 MMHG | RESPIRATION RATE: 18 BRPM | BODY MASS INDEX: 25.86 KG/M2 | OXYGEN SATURATION: 98 % | WEIGHT: 155.2 LBS | HEART RATE: 61 BPM

## 2019-07-25 DIAGNOSIS — R06.83 SNORING: Primary | ICD-10-CM

## 2019-07-25 DIAGNOSIS — G47.00 INSOMNIA, UNSPECIFIED TYPE: ICD-10-CM

## 2019-07-25 DIAGNOSIS — Z86.73 HISTORY OF STROKE: ICD-10-CM

## 2019-07-25 DIAGNOSIS — R52 PAIN AFTER CEREBROVASCULAR ACCIDENT (CVA): ICD-10-CM

## 2019-07-25 DIAGNOSIS — I69.398 PAIN AFTER CEREBROVASCULAR ACCIDENT (CVA): ICD-10-CM

## 2019-07-25 RX ORDER — TRAZODONE HYDROCHLORIDE 50 MG/1
25 TABLET ORAL
Qty: 30 TAB | Refills: 0 | Status: SHIPPED | OUTPATIENT
Start: 2019-07-25 | End: 2019-10-24

## 2019-07-25 RX ORDER — OXYCODONE AND ACETAMINOPHEN 5; 325 MG/1; MG/1
1 TABLET ORAL
Qty: 9 TAB | Refills: 0 | Status: SHIPPED | OUTPATIENT
Start: 2019-07-25 | End: 2019-07-28

## 2019-07-25 NOTE — PROGRESS NOTES
Darryl Gallardo  46 y.o. female  1980  Jackie Brasher  [id number]     [facility]: Progress Note       Encounter Date: 7/25/2019    Chief Complaint   Patient presents with    Medication Refill    Sleep Problem     having trouble falling asleep and tossing and turning all night     History of Present Illness   Darryl Gallardo is a 46 y.o. female who presents to clinic today for:    Patient presents with several issues. Patient had appointments with several specialists (pain management, GYN and psychiatry for depression) but did not go to the appointments. Sleep Problem- states her sleep problems started after having her stroke in 2017. States she goes to bed at various times at night, toss and turn throughout the night. States she has always snored. States she is in pain on the full left side of her body (left side deficits from stroke);was prescribed gabapentin by neurology but does not take the medication. States she was dx with depression but did not f/u with psych. States the Elavil did not help her insomnia nor her depression. Caffenine-drinks hot coffee all day due to her left side being cold from her stroke. However patient arguably thinks 4-5 cups of coffee a day doesn't affect her sleep. Ritual-eat dinner, tablet/phone use-memory games, shower and get in bed. . When she toss and turn-she may look at her phone or tv and eventually fall back asleep. States shes been awake since 0300; does not nap or doze off during the day. States she only leaves her home when necessary. States after her stroke she cant tolerate noise and the public. Patient verbalizing frustration with not being able to function at the level she functioned prior to her stroke. Treatment-no OTC treatment. Patient requesting relief of her pain.  reviewed. Discussed new laws and policy re: pain medication.  Will order 3 day course of medication to bridge patient over to pain management. Health Maintenance    Health Maintenance Due   Topic Date Due    DTaP/Tdap/Td series (1 - Tdap) 02/22/2002     Review of Systems   Review of Systems   Constitutional: Negative. HENT: Negative. Eyes: Negative. Respiratory: Negative. Cardiovascular: Negative. Gastrointestinal: Negative. Genitourinary: Negative. Musculoskeletal: Positive for myalgias. Skin: Negative. Neurological: Negative. Endo/Heme/Allergies: Negative. Psychiatric/Behavioral: Positive for depression. The patient has insomnia. Vitals/Objective:     Vitals:    07/25/19 1351   BP: 141/85   Pulse: 61   Resp: 18   Temp: 98.5 °F (36.9 °C)   TempSrc: Oral   SpO2: 98%   Weight: 155 lb 3.2 oz (70.4 kg)   Height: 5' 5\" (1.651 m)     Body mass index is 25.83 kg/m². Physical Exam   Constitutional: She is oriented to person, place, and time. She appears well-developed and well-nourished. HENT:   Mouth/Throat: Uvula is midline, oropharynx is clear and moist and mucous membranes are normal. Abnormal dentition. Eyes: Conjunctivae are normal.   Neck: Normal range of motion. Pulmonary/Chest: Effort normal.   Neurological: She is alert and oriented to person, place, and time. Skin: Skin is warm and dry. Psychiatric: She has a normal mood and affect. Her behavior is normal. Judgment and thought content normal.       No results found for this or any previous visit (from the past 24 hour(s)). Assessment and Plan:   1. Snoring    - REFERRAL TO PULMONARY DISEASE    2. History of stroke    - REFERRAL TO PULMONARY DISEASE    3. Insomnia, unspecified type    - REFERRAL TO PULMONARY DISEASE    4. Pain after cerebrovascular accident (CVA)    - oxyCODONE-acetaminophen (PERCOCET) 5-325 mg per tablet; Take 1 Tab by mouth every eight (8) hours as needed for Pain for up to 3 days. Max Daily Amount: 3 Tabs. Dispense: 9 Tab; Refill: 0    Discussed sleep study to r/o CHANDNI. Will try trazodone 25mg nightly for insomnia. Patient with no reported history of seizure or seizure like activity. Discussed safety with medication. Discussed sleep hygiene and stopping caffeine at 1600. Discussed not getting on tablet, phone or watching TV if she wakes up at night but trying to fall back to sleep. Discussed the temperature of the room and darkness to promote better sleep. Printed phone numbers to the pain specialist, GYN and psych so that patient can call and make appointments. I have discussed the diagnosis with the patient and the intended plan as seen in the above orders. she has expressed understanding. The patient has received an after-visit summary and questions were answered concerning future plans. I have discussed medication side effects and warnings with the patient as well. Electronically Signed: Kandy Gardner NP     History/Allergies   Patients past medical, surgical and family histories were reviewed and updated.     Past Medical History:   Diagnosis Date    Depression     GERD (gastroesophageal reflux disease)     Headache     Hypertension     Neuropathic pain of forearm, left     Neuropathic pain of left lower extremity     Stroke [facility]) 11/2017 2017      Past Surgical History:   Procedure Laterality Date    CARDIAC SURG PROCEDURE UNLIST  2018    implantable loop recorder for A fib    HX DILATION AND CURETTAGE      D & C     Family History   Problem Relation Age of Onset    Schizophrenia Mother     Depression Mother     Anxiety Mother     Asthma Mother     Crohn's Disease Mother     Stroke Father     Hypertension Father     No Known Problems Sister     No Known Problems Brother     Other Son         pyloric stenosis    Cancer Maternal Grandfather         Lung    Other Paternal Grandfather         Aneursym    Colon Cancer Maternal Uncle     Cancer Maternal Uncle         Brain cancer     Social History     Socioeconomic History    Marital status: SINGLE     Spouse name: Not on file    Number of children: Not on file    Years of education: Not on file    Highest education level: Not on file   Occupational History    Not on file   Social Needs    Financial resource strain: Not on file    Food insecurity:     Worry: Not on file     Inability: Not on file    Transportation needs:     Medical: Not on file     Non-medical: Not on file   Tobacco Use    Smoking status: Former Smoker     Packs/day: 1.80     Years: 10.00     Pack years: 18.00     Types: Cigarettes    Smokeless tobacco: Never Used   Substance and Sexual Activity    Alcohol use: No    Drug use: Yes     Types: Marijuana    Sexual activity: Yes     Partners: Male     Birth control/protection: Surgical   Lifestyle    Physical activity:     Days per week: Not on file     Minutes per session: Not on file    Stress: Not on file   Relationships    Social connections:     Talks on phone: Not on file     Gets together: Not on file     Attends Orthodox service: Not on file     Active member of club or organization: Not on file     Attends meetings of clubs or organizations: Not on file     Relationship status: Not on file    Intimate partner violence:     Fear of current or ex partner: Not on file     Emotionally abused: Not on file     Physically abused: Not on file     Forced sexual activity: Not on file   Other Topics Concern    Not on file   Social History Narrative    Not on file         Allergies   Allergen Reactions    Carvedilol Hives    Tramadol Nausea Only     Headaches  Patient states not allergic       Disposition     Follow-up and Dispositions  ·   Return if symptoms worsen or fail to improve. No future appointments. Current Medications after this visit     Current Outpatient Medications   Medication Sig    traZODone (DESYREL) 50 mg tablet Take 0.5 Tabs by mouth nightly.  Indications: insomnia associated with depression    oxyCODONE-acetaminophen (PERCOCET) 5-325 mg per tablet Take 1 Tab by mouth every eight (8) hours as needed for Pain for up to 3 days. Max Daily Amount: 3 Tabs.  famotidine (PEPCID) 20 mg tablet TAKE 1 TABLET BY MOUTH NIGHTLY    triamcinolone acetonide (KENALOG) 0.1 % topical cream     ammonium lactate (AMLACTIN) 12 % topical cream     hydroCHLOROthiazide (HYDRODIURIL) 25 mg tablet TAKE 1 TABLET BY MOUTH DAILY    PRILOSEC OTC 20 mg tablet TAKE ONE TABLET BY MOUTH EVERY DAY    aspirin 81 mg chewable tablet CHEW AND SWALLOW ONE TABLET BY MOUTH EVERY DAY    montelukast (SINGULAIR) 10 mg tablet Take 1 Tab by mouth daily.  loratadine (CLARITIN) 10 mg tablet Take 1 Tab by mouth daily.  fluticasone (FLONASE) 50 mcg/actuation nasal spray 2 Sprays by Both Nostrils route daily.  atenolol (TENORMIN) 50 mg tablet TAKE 1 TABLET BY MOUTH DAILY    atorvastatin (LIPITOR) 40 mg tablet Take 1 Tab by mouth daily.  lisinopril (PRINIVIL, ZESTRIL) 10 mg tablet TAKE 1 TABLET BY MOUTH NIGHTLY     No current facility-administered medications for this visit.       Medications Discontinued During This Encounter   Medication Reason    gabapentin (NEURONTIN) 800 mg tablet Therapy Completed

## 2019-07-25 NOTE — PROGRESS NOTES
Chief Complaint   Patient presents with    Medication Refill    Sleep Problem     having trouble falling asleep and tossing and turning all night     Visit Vitals  /85 (BP 1 Location: Right arm, BP Patient Position: Sitting)   Pulse 61   Temp 98.5 °F (36.9 °C) (Oral)   Resp 18   Ht 5' 5\" (1.651 m)   Wt 155 lb 3.2 oz (70.4 kg)   SpO2 98%   BMI 25.83 kg/m²     1. Have you been to the ER, urgent care clinic since your last visit? Hospitalized since your last visit? No    2. Have you seen or consulted any other health care providers outside of the [address] [place] [address] since your last visit? Include any pap smears or colon screening.  No    Reviewed record in preparation for visit and have necessary documentation  Pt did not bring medication to office visit for review  opportunity was given for questions  Goals that were addressed and/or need to be completed during or after this appointment include   Health Maintenance Due   Topic Date Due    DTaP/Tdap/Td series (1 - Tdap) 02/22/2002

## 2019-07-25 NOTE — PATIENT INSTRUCTIONS

## 2019-10-24 ENCOUNTER — OFFICE VISIT (OUTPATIENT)
Dept: FAMILY MEDICINE CLINIC | Age: 39
End: 2019-10-24

## 2019-10-24 VITALS
HEART RATE: 80 BPM | OXYGEN SATURATION: 100 % | SYSTOLIC BLOOD PRESSURE: 138 MMHG | WEIGHT: 152 LBS | RESPIRATION RATE: 16 BRPM | BODY MASS INDEX: 25.33 KG/M2 | TEMPERATURE: 97.7 F | DIASTOLIC BLOOD PRESSURE: 87 MMHG | HEIGHT: 65 IN

## 2019-10-24 DIAGNOSIS — J30.1 NON-SEASONAL ALLERGIC RHINITIS DUE TO POLLEN: ICD-10-CM

## 2019-10-24 DIAGNOSIS — M79.2 NEUROPATHIC PAIN: Primary | ICD-10-CM

## 2019-10-24 DIAGNOSIS — G81.94 LEFT HEMIPARESIS (HCC): ICD-10-CM

## 2019-10-24 DIAGNOSIS — G93.9 BRAIN DAMAGE: ICD-10-CM

## 2019-10-24 DIAGNOSIS — E78.2 MIXED HYPERLIPIDEMIA: ICD-10-CM

## 2019-10-24 DIAGNOSIS — I10 ESSENTIAL HYPERTENSION: ICD-10-CM

## 2019-10-24 RX ORDER — PHENOLPHTHALEIN 90 MG
10 TABLET,CHEWABLE ORAL DAILY
Qty: 240 ML | Refills: 5 | Status: SHIPPED | OUTPATIENT
Start: 2019-10-24 | End: 2019-12-09 | Stop reason: ALTCHOICE

## 2019-10-24 NOTE — PROGRESS NOTES
CC: f/u HTN    HPI: Pt is a 46 y.o. female who presents for f/u HTN. She is a poor historian 2/2 h/o stroke and her thought process is tangential and difficult to follow. She shows up sporadically for care and always alone. She has not wanted me to discuss her health issues with anyone else either. It is never clear to me if she is taking her medications as directed. She has chronic weakness and pain on the left side of her body since her stroke. She has a hard time walking unassisted because of her balance and is requesting a four pronged cane. She has had several falls recently. She could also benefit from a shower chair to prevent falls in the shower. She has an appt with Pain Management on 12/3 but is wondering if there is anything that can be tried in the meantime. She has tried Elavil and gabapentin in the past which were not helpful. Pain is on the entire left side of her body and feels burning in nature. She is very upset today that chronic narcotic medication has not been prescribed for her and seems to believe this is the only thing that will work for her pain. She has a hard time sleeping at night because of the pain and it was recommended to her that she try using a wedge to relieve the pressure on that side. They also recommended she use a wedge to elevate her legs at night to improve swelling and pain. She has an appt coming up with Psychiatry who specializes in patients who have had stroke.        Past Medical History:   Diagnosis Date    Depression     GERD (gastroesophageal reflux disease)     Headache     Hypertension     Neuropathic pain of forearm, left     Neuropathic pain of left lower extremity     Stroke [facility]) 11/2017 2017       Family History   Problem Relation Age of Onset    Schizophrenia Mother     Depression Mother     Anxiety Mother     Asthma Mother     Crohn's Disease Mother     Stroke Father     Hypertension Father     No Known Problems Sister     No Known Problems Brother     Other Son         pyloric stenosis    Cancer Maternal Grandfather         Lung    Other Paternal Grandfather         Aneursym    Colon Cancer Maternal Uncle     Cancer Maternal Uncle         Brain cancer       Social History     Tobacco Use    Smoking status: Former Smoker     Packs/day: 1.80     Years: 10.00     Pack years: 18.00     Types: Cigarettes    Smokeless tobacco: Never Used   Substance Use Topics    Alcohol use: No    Drug use: Yes     Types: Marijuana       ROS:  Per HPI    PE:  Visit Vitals  /87 (BP 1 Location: Right arm, BP Patient Position: Sitting)   Pulse 80   Temp 97.7 °F (36.5 °C) (Oral)   Resp 16   Ht 5' 5\" (1.651 m)   Wt 152 lb (68.9 kg)   SpO2 100%   BMI 25.29 kg/m²     Gen: Pt sitting in chair, in NAD  Head: Normocephalic, atraumatic  Eyes: Sclera anicteric, EOM grossly intact, PERRL  Throat: MMM, normal lips, tongue and gums  Neck: Supple  CVS: Normal S1, S2, no m/r/g  Resp: CTAB, no wheezes or rales  Extrem: Atraumatic, no cyanosis or edema  Pulses: 2+   Skin: Warm, dry  Neuro: Alert  Psych: Affect labile, thought process tangential and difficult to follow. Speech clear. A/P:   Encounter Diagnoses     ICD-10-CM ICD-9-CM   1. Neuropathic pain M79.2 729.2   2. Left hemiparesis (HCC) G81.94 342.90   3. Essential hypertension I10 401.9   4. Mixed hyperlipidemia E78.2 272.2   5. Non-seasonal allergic rhinitis due to pollen J30.1 477.0   6. Brain damage G93.9 348.9     1. Chronic neuropathic pain of left side of body: Since stroke. Despite her conversation, I do not believe she is \"drug-seeking\". I believe she truly thinks that narcotics are the only thing that will improve her pain and that is why she asks for them. Unfortunately, this tightly-held belief has made her unwilling to try other medications. I do not believe she took the Elavil for more than a week and I am unsure about the gabapentin because it was prescribed by another physician.  I discussed trying Lyrica with her today, but unfortunately because I referred to it as a medication for her [place] Shove pain\" she became unwilling to try it. She stated multiple times that she felt like a guinea pig and that no one was doing anything to help her. I emphasized that I do care about her pain and want to find a solution, but I truly feel that a medication like Lyrica is more likely to provide relief from this pain as it is nerve-related. I advised her that Pain Management is likely to suggest the Lyrica as well and she may save herself some time by at least trying it first, but she is unwilling to do this. I worry about her memory when it comes to taking narcotic medications, as well as her ability to safely store the medication and prevent someone from taking advantage of her mental status and stealing it. 2. Left hemiparesis ([address]): I believe she would benefit greatly from a four-pronged cane, shower chair, and wedges to prop her legs and her side to help with her pain. I have ordered DME for her on every visit in the past year and tried several different companies but she has yet to receive anything. I suspect the companies have tried to get in touch with her and not been able to, as we have had this problem as well. 3. Essential hypertension: BP at goal today. She reports taking lisinopril and HCTZ however per the order dates she should have run out of HCTZ at least a month ago. Will call pharmacy to check on this. BMP ordered and importance of getting this explained to pt however she refuses because she is upset about her pain  - METABOLIC PANEL, BASIC    4. Mixed hyperlipidemia    5. Non-seasonal allergic rhinitis due to pollen: pt requesting a liquid form of this medication because she states she is tired of taking pills  - loratadine (CLARITIN) 5 mg/5 mL syrup; Take 10 mL by mouth daily.   Dispense: 240 mL; Refill: 5       RTC in 6 months for f/u chronic conditions, or sooner prn    Over 50% of the 40 minutes face to face with Steffi Garcia consisted of counseling and/or discussing treatment plans in reference to her neuropathic pain and hemiparesis. Discussed diagnoses in detail with patient. Medication risks/benefits/side effects discussed with patient. All of the patient's questions were addressed. The patient understands and agrees with our plan of care. The patient knows to call back if they are unsure of or forget any changes we discussed today or if the symptoms change. The patient received an After-Visit Summary which contains VS, orders, medication list and allergy list. This can be used as a \"mini-medical record\" should they have to seek medical care while out of town. Current Outpatient Medications on File Prior to Visit   Medication Sig Dispense Refill    lisinopril (PRINIVIL, ZESTRIL) 10 mg tablet TAKE 1 TABLET BY MOUTH NIGHTLY 30 Tab 2    atorvastatin (LIPITOR) 40 mg tablet TAKE ONE TABLET BY MOUTH EVERY DAY 30 Tab 2    atenolol (TENORMIN) 50 mg tablet TAKE 1 TABLET BY MOUTH DAILY 30 Tab 2    famotidine (PEPCID) 20 mg tablet TAKE 1 TABLET BY MOUTH NIGHTLY 30 Tab 5    triamcinolone acetonide (KENALOG) 0.1 % topical cream   3    ammonium lactate (AMLACTIN) 12 % topical cream   3    hydroCHLOROthiazide (HYDRODIURIL) 25 mg tablet TAKE 1 TABLET BY MOUTH DAILY 30 Tab 3    PRILOSEC OTC 20 mg tablet TAKE ONE TABLET BY MOUTH EVERY DAY 30 Tab 5    aspirin 81 mg chewable tablet CHEW AND SWALLOW ONE TABLET BY MOUTH EVERY DAY 30 Tab 5    montelukast (SINGULAIR) 10 mg tablet Take 1 Tab by mouth daily. 30 Tab 3    fluticasone (FLONASE) 50 mcg/actuation nasal spray 2 Sprays by Both Nostrils route daily. 1 Bottle 3     No current facility-administered medications on file prior to visit.

## 2019-10-24 NOTE — PATIENT INSTRUCTIONS
Pregabalin (By mouth)   Pregabalin (pre-GA-ba-freddie)  Treats nerve and muscle pain, including fibromyalgia. Also treats seizures. Brand Name(s): Lyrica   There may be other brand names for this medicine. When This Medicine Should Not Be Used: This medicine is not right for everyone. Do not use it if you had an allergic reaction to pregabalin. How to Use This Medicine:   Capsule, Liquid  · Take your medicine as directed. Your dose may need to be changed several times to find what works best for you. · Measure the oral liquid medicine with a marked measuring spoon, oral syringe, or medicine cup. · This medicine should come with a Medication Guide. Ask your pharmacist for a copy if you do not have one. · Missed dose: Take a dose as soon as you remember. If it is almost time for your next dose, wait until then and take a regular dose. Do not take extra medicine to make up for a missed dose. · Store the medicine in a closed container at room temperature, away from heat, moisture, and direct light. Drugs and Foods to Avoid:   Ask your doctor or pharmacist before using any other medicine, including over-the-counter medicines, vitamins, and herbal products. · Some medicines can affect how pregabalin works. Tell your doctor if you are using any of the following:   ¨ Diabetes medicine that you take by mouth (pioglitazone, rosiglitazone)  ¨ An ACE inhibitor (benazepril, enalapril, lisinopril, quinapril, ramipril)  · Tell your doctor if you use anything else that makes you sleepy. Some examples are allergy medicine, narcotic pain medicine, and alcohol. Warnings While Using This Medicine:   · Tell your doctor if you are pregnant or breastfeeding, or if you have kidney disease, heart failure, heart rhythm problems, angioedema, a bleeding disorder, or a low blood platelet count. Tell your doctor if you have a history of alcohol or drug abuse, depression, or other mood problems.   · This medicine may cause the following problems:   ¨ Serious allergic reactions  ¨ Suicidal thoughts  · This medicine may make you dizzy or drowsy. It may also cause blurry or double vision. Do not drive or do anything that could be dangerous until you know how this medicine affects you. · Do not stop using this medicine suddenly. Your doctor will need to slowly decrease your dose before you stop it completely. · Your doctor will check your progress and the effects of this medicine at regular visits. Keep all appointments. · Keep all medicine out of the reach of children. Never share your medicine with anyone. Possible Side Effects While Using This Medicine:   Call your doctor right away if you notice any of these side effects:  · Allergic reaction: Itching or hives, swelling in your face or hands, swelling or tingling in your mouth or throat, chest tightness, trouble breathing  · Blistering, peeling, red skin rash  · Blurry or double vision  · Fever, chills, cough, sore throat, and body aches  · Muscle pain, tenderness, or weakness, fever, or general ill feeling  · Rapid weight gain, swelling in your hands, ankles, or feet  · Severe dizziness or drowsiness  · Sudden mood changes, unusual thoughts or behavior such as extreme happiness or depression  · Suicidal thoughts  · Swelling in your throat, head, or neck  · Uneven heartbeat  · Unusual bleeding, bruising, or weakness  If you notice these less serious side effects, talk with your doctor:   · Confusion, trouble concentrating  · Constipation  · Dry mouth  If you notice other side effects that you think are caused by this medicine, tell your doctor. Call your doctor for medical advice about side effects. You may report side effects to FDA at 6-364-VLV-2096  © 2017 [facility] Information is for End User's use only and may not be sold, redistributed or otherwise used for commercial purposes. The above information is an  only.  It is not intended as medical advice for individual conditions or treatments. Talk to your doctor, nurse or pharmacist before following any medical regimen to see if it is safe and effective for you.

## 2019-10-24 NOTE — PROGRESS NOTES
1. Have you been to the ER, urgent care clinic since your last visit? Hospitalized since your last visit? no    2. Have you seen or consulted any other health care providers outside of the [address] [place] [address] since your last visit? Include any pap smears or colon screening. no  Reviewed record in preparation for visit and have obtained necessary documentation. Patient did not bring medications to visit for review. Information provided on Advanced Directive, Living Will. Body mass index is 25.29 kg/m².    Health Maintenance Due   Topic Date Due    DTaP/Tdap/Td series (1 - Tdap) 02/22/2002    Influenza Age 5 to Adult  08/01/2019

## 2019-12-09 ENCOUNTER — OFFICE VISIT (OUTPATIENT)
Dept: FAMILY MEDICINE CLINIC | Age: 39
End: 2019-12-09

## 2019-12-09 VITALS
DIASTOLIC BLOOD PRESSURE: 85 MMHG | WEIGHT: 160.2 LBS | RESPIRATION RATE: 18 BRPM | OXYGEN SATURATION: 97 % | SYSTOLIC BLOOD PRESSURE: 151 MMHG | BODY MASS INDEX: 26.69 KG/M2 | TEMPERATURE: 98.5 F | HEART RATE: 86 BPM | HEIGHT: 65 IN

## 2019-12-09 DIAGNOSIS — R05.9 COUGH: ICD-10-CM

## 2019-12-09 DIAGNOSIS — J34.89 SINUS PRESSURE: Primary | ICD-10-CM

## 2019-12-09 DIAGNOSIS — J06.9 UPPER RESPIRATORY TRACT INFECTION, UNSPECIFIED TYPE: ICD-10-CM

## 2019-12-09 LAB
QUICKVUE INFLUENZA TEST: NEGATIVE
VALID INTERNAL CONTROL?: YES

## 2019-12-09 RX ORDER — CODEINE PHOSPHATE AND GUAIFENESIN 10; 100 MG/5ML; MG/5ML
5 SOLUTION ORAL
Qty: 25 ML | Refills: 0 | Status: SHIPPED | OUTPATIENT
Start: 2019-12-09 | End: 2019-12-14

## 2019-12-09 RX ORDER — CETIRIZINE HYDROCHLORIDE 1 MG/ML
10 SOLUTION ORAL
Qty: 1 BOTTLE | Refills: 3 | Status: SHIPPED | OUTPATIENT
Start: 2019-12-09 | End: 2020-04-13

## 2019-12-09 RX ORDER — AMOXICILLIN AND CLAVULANATE POTASSIUM 875; 125 MG/1; MG/1
1 TABLET, FILM COATED ORAL EVERY 12 HOURS
Qty: 14 TAB | Refills: 0 | Status: SHIPPED | OUTPATIENT
Start: 2019-12-09 | End: 2019-12-16

## 2019-12-09 NOTE — PROGRESS NOTES
Talia Turcios  46 y.o. female  1980  Jackie Brasher  [id number]     [facility] Practice: Progress Note       Encounter Date: 12/9/2019    Chief Complaint   Patient presents with    Cold Symptoms     cough, congestion, sinus drainage     History of Present Illness   Talia Turcios is a 46 y.o. female who presents to clinic today for:    Cold symptoms- over the weekend-sinus pain/pressure, right ear pain, coughing, chest congestion, sore throat. Treatment-claritin, singulair, OTC generic cough medicine. Denies fevers but \"cold and hurting\". Sick contact exposure-significant other with URI symptoms. Patient is in exam room with a coat, hat, scarf and gloves and states she is \"freezing\". Of note patient is requesting to switch from claritin to zyrtec for allergic rhinitis and allergies. Health Maintenance    Health Maintenance Due   Topic Date Due    DTaP/Tdap/Td series (1 - Tdap) 02/22/1992    Influenza Age 5 to Adult  08/01/2019     Review of Systems   Review of Systems   Constitutional: Negative. HENT: Positive for congestion, ear pain, sinus pain and sore throat. Eyes: Negative. Respiratory: Positive for cough. Cardiovascular: Negative. Gastrointestinal: Negative. Genitourinary: Negative. Musculoskeletal: Negative. Skin: Negative. Neurological: Positive for headaches. Endo/Heme/Allergies: Negative. Psychiatric/Behavioral: Negative. Vitals/Objective:     Vitals:    12/09/19 1034   BP: 151/85   Pulse: 86   Resp: 18   Temp: 98.5 °F (36.9 °C)   TempSrc: Oral   SpO2: 97%   Weight: 160 lb 3.2 oz (72.7 kg)   Height: 5' 5\" (1.651 m)     Body mass index is 26.66 kg/m². Physical Exam  HENT:      Head: Normocephalic. Right Ear: Hearing, tympanic membrane, ear canal and external ear normal.      Left Ear: Hearing, tympanic membrane, ear canal and external ear normal.      Nose: Rhinorrhea present.       Right Sinus: Maxillary sinus tenderness and frontal sinus tenderness present. Left Sinus: Maxillary sinus tenderness and frontal sinus tenderness present. Mouth/Throat:      Lips: Pink. Mouth: Mucous membranes are moist.      Pharynx: Oropharynx is clear. Uvula midline. Eyes:      General: Lids are normal.      Conjunctiva/sclera: Conjunctivae normal.   Neck:      Musculoskeletal: Full passive range of motion without pain, normal range of motion and neck supple. Trachea: Trachea and phonation normal.   Cardiovascular:      Rate and Rhythm: Normal rate and regular rhythm. Pulses: Normal pulses. Heart sounds: Normal heart sounds. Pulmonary:      Effort: Pulmonary effort is normal.      Breath sounds: Normal breath sounds and air entry. Lymphadenopathy:      Cervical: No cervical adenopathy. Skin:     General: Skin is warm and dry. Capillary Refill: Capillary refill takes less than 2 seconds. Neurological:      Mental Status: She is alert and oriented to person, place, and time. Psychiatric:         Attention and Perception: Attention and perception normal.         Mood and Affect: Mood and affect normal.         Speech: Speech normal.         Behavior: Behavior is cooperative. Thought Content: Thought content normal.         Cognition and Memory: Cognition normal.         Judgment: Judgment normal.         Recent Results (from the past 24 hour(s))   AMB POC RAPID INFLUENZA TEST    Collection Time: 12/09/19 11:13 AM   Result Value Ref Range    VALID INTERNAL CONTROL POC Yes     QuickVue Influenza test Negative Negative     Assessment and Plan:   1. Sinus pressure    - amoxicillin-clavulanate (AUGMENTIN) 875-125 mg per tablet; Take 1 Tab by mouth every twelve (12) hours for 7 days. Dispense: 14 Tab; Refill: 0    2. Cough    - amoxicillin-clavulanate (AUGMENTIN) 875-125 mg per tablet; Take 1 Tab by mouth every twelve (12) hours for 7 days. Dispense: 14 Tab;  Refill: 0  - guaiFENesin-codeine (ROBITUSSIN AC) 100-10 mg/5 mL solution; Take 5 mL by mouth nightly for 5 days. Max Daily Amount: 5 mL. Dispense: 25 mL; Refill: 0  - AMB POC RAPID INFLUENZA TEST    3. Upper respiratory tract infection, unspecified type    - amoxicillin-clavulanate (AUGMENTIN) 875-125 mg per tablet; Take 1 Tab by mouth every twelve (12) hours for 7 days. Dispense: 14 Tab; Refill: 0    Will cover with augmentin-tolerated medication in the past. Discussed fluids, rest, and treating the symptoms. Discussed approved on 5 days of robitussin AC-patient requested. Discussed the safety of taking the medication only at night. I have discussed the diagnosis with the patient and the intended plan as seen in the above orders. she has expressed understanding. The patient has received an after-visit summary and questions were answered concerning future plans. I have discussed medication side effects and warnings with the patient as well. Electronically Signed: Cristofer Garcia NP     History/Allergies   Patients past medical, surgical and family histories were reviewed and updated.     Past Medical History:   Diagnosis Date    Depression     GERD (gastroesophageal reflux disease)     Headache     Hypertension     Neuropathic pain of forearm, left     Neuropathic pain of left lower extremity     Stroke [facility]) 11/2017 2017      Past Surgical History:   Procedure Laterality Date    CARDIAC SURG PROCEDURE UNLIST  2018    implantable loop recorder for A fib    HX DILATION AND CURETTAGE      D & C     Family History   Problem Relation Age of Onset    Schizophrenia Mother     Depression Mother     Anxiety Mother     Asthma Mother     Crohn's Disease Mother     Stroke Father     Hypertension Father     No Known Problems Sister     No Known Problems Brother     Other Son         pyloric stenosis    Cancer Maternal Grandfather         Lung    Other Paternal Grandfather         Aneursym    Colon Cancer Maternal Uncle     Cancer Maternal Uncle         Brain cancer     Social History     Socioeconomic History    Marital status: SINGLE     Spouse name: Not on file    Number of children: Not on file    Years of education: Not on file    Highest education level: Not on file   Occupational History    Not on file   Social Needs    Financial resource strain: Not on file    Food insecurity:     Worry: Not on file     Inability: Not on file    Transportation needs:     Medical: Not on file     Non-medical: Not on file   Tobacco Use    Smoking status: Former Smoker     Packs/day: 1.80     Years: 10.00     Pack years: 18.00     Types: Cigarettes    Smokeless tobacco: Never Used   Substance and Sexual Activity    Alcohol use: No    Drug use: Yes     Types: Marijuana    Sexual activity: Yes     Partners: Male     Birth control/protection: Surgical   Lifestyle    Physical activity:     Days per week: Not on file     Minutes per session: Not on file    Stress: Not on file   Relationships    Social connections:     Talks on phone: Not on file     Gets together: Not on file     Attends [employer] service: Not on file     Active member of club or organization: Not on file     Attends meetings of clubs or organizations: Not on file     Relationship status: Not on file    Intimate partner violence:     Fear of current or ex partner: Not on file     Emotionally abused: Not on file     Physically abused: Not on file     Forced sexual activity: Not on file   Other Topics Concern    Not on file   Social History Narrative    Not on file         Allergies   Allergen Reactions    Carvedilol Hives    Tramadol Nausea Only     Headaches  Patient states not allergic       Disposition     Follow-up and Dispositions  ·   Return if symptoms worsen or fail to improve. No future appointments.          Current Medications after this visit     Current Outpatient Medications   Medication Sig    amoxicillin-clavulanate (AUGMENTIN) 875-125 mg per tablet Take 1 Tab by mouth every twelve (12) hours for 7 days.  guaiFENesin-codeine (ROBITUSSIN AC) 100-10 mg/5 mL solution Take 5 mL by mouth nightly for 5 days. Max Daily Amount: 5 mL.  cetirizine (ZYRTEC) 1 mg/mL solution Take 10 mL by mouth nightly. Indications: inflammation of the nose due to an allergy    aspirin 81 mg chewable tablet CHEW AND SWALLOW ONE TABLET BY MOUTH EVERY DAY    lisinopril (PRINIVIL, ZESTRIL) 10 mg tablet TAKE 1 TABLET BY MOUTH NIGHTLY    atorvastatin (LIPITOR) 40 mg tablet TAKE ONE TABLET BY MOUTH EVERY DAY    atenolol (TENORMIN) 50 mg tablet TAKE 1 TABLET BY MOUTH DAILY    famotidine (PEPCID) 20 mg tablet TAKE 1 TABLET BY MOUTH NIGHTLY    triamcinolone acetonide (KENALOG) 0.1 % topical cream     ammonium lactate (AMLACTIN) 12 % topical cream     hydroCHLOROthiazide (HYDRODIURIL) 25 mg tablet TAKE 1 TABLET BY MOUTH DAILY    PRILOSEC OTC 20 mg tablet TAKE ONE TABLET BY MOUTH EVERY DAY    montelukast (SINGULAIR) 10 mg tablet Take 1 Tab by mouth daily.  fluticasone (FLONASE) 50 mcg/actuation nasal spray 2 Sprays by Both Nostrils route daily. No current facility-administered medications for this visit.       Medications Discontinued During This Encounter   Medication Reason    loratadine (CLARITIN) 5 mg/5 mL syrup Therapy Completed

## 2019-12-09 NOTE — PROGRESS NOTES
Chief Complaint   Patient presents with    Cold Symptoms     cough, congestion, sinus drainage     Visit Vitals  /85 (BP 1 Location: Right arm, BP Patient Position: Sitting)   Pulse 86   Temp 98.5 °F (36.9 °C) (Oral)   Resp 18   Ht 5' 5\" (1.651 m)   Wt 160 lb 3.2 oz (72.7 kg)   SpO2 97%   BMI 26.66 kg/m²       1. Have you been to the ER, urgent care clinic since your last visit? Hospitalized since your last visit? No    2. Have you seen or consulted any other health care providers outside of the [address] [place] [address] since your last visit? Include any pap smears or colon screening.  No    Reviewed record in preparation for visit and have necessary documentation  Pt did not bring medication to office visit for review  opportunity was given for questions  Goals that were addressed and/or need to be completed during or after this appointment include   Health Maintenance Due   Topic Date Due    DTaP/Tdap/Td series (1 - Tdap) 02/22/1992    Influenza Age 5 to Adult  08/01/2019

## 2019-12-09 NOTE — PATIENT INSTRUCTIONS
Sinusitis: Care Instructions Your Care Instructions Sinusitis is an infection of the lining of the sinus cavities in your head. Sinusitis often follows a cold. It causes pain and pressure in your head and face. In most cases, sinusitis gets better on its own in 1 to 2 weeks. But some mild symptoms may last for several weeks. Sometimes antibiotics are needed. Follow-up care is a key part of your treatment and safety. Be sure to make and go to all appointments, and call your doctor if you are having problems. It's also a good idea to know your test results and keep a list of the medicines you take. How can you care for yourself at home? · Take an over-the-counter pain medicine, such as acetaminophen (Tylenol), ibuprofen (Advil, Motrin), or naproxen (Aleve). Read and follow all instructions on the label. · If the doctor prescribed antibiotics, take them as directed. Do not stop taking them just because you feel better. You need to take the full course of antibiotics. · Be careful when taking over-the-counter cold or flu medicines and Tylenol at the same time. Many of these medicines have acetaminophen, which is Tylenol. Read the labels to make sure that you are not taking more than the recommended dose. Too much acetaminophen (Tylenol) can be harmful. · Breathe warm, moist air from a steamy shower, a hot bath, or a sink filled with hot water. Avoid cold, dry air. Using a humidifier in your home may help. Follow the directions for cleaning the machine. · Use saline (saltwater) nasal washes to help keep your nasal passages open and wash out mucus and bacteria. You can buy saline nose drops at a grocery store or drugstore. Or you can make your own at home by adding 1 teaspoon of salt and 1 teaspoon of baking soda to 2 cups of distilled water. If you make your own, fill a bulb syringe with the solution, insert the tip into your nostril, and squeeze gently. [place] Lathe your nose. · Put a hot, wet towel or a warm gel pack on your face 3 or 4 times a day for 5 to 10 minutes each time. · Try a decongestant nasal spray like oxymetazoline (Afrin). Do not use it for more than 3 days in a row. Using it for more than 3 days can make your congestion worse. When should you call for help? Call your doctor now or seek immediate medical care if: 
  · You have new or worse swelling or redness in your face or around your eyes.  
  · You have a new or higher fever.  
 Watch closely for changes in your health, and be sure to contact your doctor if: 
  · You have new or worse facial pain.  
  · The mucus from your nose becomes thicker (like pus) or has new blood in it.  
  · You are not getting better as expected. Where can you learn more? Go to http://rose-mike.info/. Enter O684 in the search box to learn more about \"Sinusitis: Care Instructions. \" Current as of: October 21, 2018 Content Version: 12.2 © 3007-7428 [employer]. Care instructions adapted under license by [employer] (which disclaims liability or warranty for this information). If you have questions about a medical condition or this instruction, always ask your healthcare professional. [address] any warranty or liability for your use of this information. Cough: Care Instructions Your Care Instructions A cough is your body's response to something that bothers your throat or airways. Many things can cause a cough. You might cough because of a cold or the flu, bronchitis, or asthma. Smoking, postnasal drip, allergies, and stomach acid that backs up into your throat also can cause coughs. A cough is a symptom, not a disease. Most coughs stop when the cause, such as a cold, goes away. You can take a few steps at home to cough less and feel better. Follow-up care is a key part of your treatment and safety.  Be sure to make and go to all appointments, and call your doctor if you are having problems. It's also a good idea to know your test results and keep a list of the medicines you take. How can you care for yourself at home? · Drink lots of water and other fluids. This helps thin the mucus and soothes a dry or sore throat. Honey or lemon juice in hot water or tea may ease a dry cough. · Take cough medicine as directed by your doctor. · Prop up your head on pillows to help you breathe and ease a dry cough. · Try cough drops to soothe a dry or sore throat. Cough drops don't stop a cough. Medicine-flavored cough drops are no better than candy-flavored drops or hard candy. · Do not smoke. Avoid secondhand smoke. If you need help quitting, talk to your doctor about stop-smoking programs and medicines. These can increase your chances of quitting for good. When should you call for help? Call 911 anytime you think you may need emergency care. For example, call if: 
  · You have severe trouble breathing.  
 Call your doctor now or seek immediate medical care if: 
  · You cough up blood.  
  · You have new or worse trouble breathing.  
  · You have a new or higher fever.  
  · You have a new rash.  
 Watch closely for changes in your health, and be sure to contact your doctor if: 
  · You cough more deeply or more often, especially if you notice more mucus or a change in the color of your mucus.  
  · You have new symptoms, such as a sore throat, an earache, or sinus pain.  
  · You do not get better as expected. Where can you learn more? Go to http://rose-mike.info/. Enter D279 in the search box to learn more about \"Cough: Care Instructions. \" Current as of: June 9, 2019 Content Version: 12.2 © 3157-7351 [employer]. Care instructions adapted under license by [employer] (which disclaims liability or warranty for this information).  If you have questions about a medical condition or this instruction, always ask your healthcare professional. [place] [address] any warranty or liability for your use of this information.

## 2020-02-20 ENCOUNTER — OFFICE VISIT (OUTPATIENT)
Dept: FAMILY MEDICINE CLINIC | Age: 40
End: 2020-02-20

## 2020-02-20 ENCOUNTER — HOSPITAL ENCOUNTER (OUTPATIENT)
Dept: LAB | Age: 40
Discharge: HOME | End: 2020-02-20

## 2020-02-20 VITALS
HEIGHT: 65 IN | DIASTOLIC BLOOD PRESSURE: 84 MMHG | RESPIRATION RATE: 20 BRPM | WEIGHT: 155.2 LBS | TEMPERATURE: 99 F | SYSTOLIC BLOOD PRESSURE: 134 MMHG | OXYGEN SATURATION: 99 % | BODY MASS INDEX: 25.86 KG/M2 | HEART RATE: 73 BPM

## 2020-02-20 DIAGNOSIS — I10 ESSENTIAL HYPERTENSION: Primary | ICD-10-CM

## 2020-02-20 DIAGNOSIS — E78.2 MIXED HYPERLIPIDEMIA: ICD-10-CM

## 2020-02-20 DIAGNOSIS — Z86.73 H/O: STROKE: ICD-10-CM

## 2020-02-20 DIAGNOSIS — I10 ESSENTIAL HYPERTENSION: ICD-10-CM

## 2020-02-20 DIAGNOSIS — R53.83 OTHER FATIGUE: ICD-10-CM

## 2020-02-20 DIAGNOSIS — R53.1 LEFT-SIDED WEAKNESS: ICD-10-CM

## 2020-02-20 DIAGNOSIS — K04.7 TOOTH ABSCESS: ICD-10-CM

## 2020-02-20 DIAGNOSIS — K21.9 GASTROESOPHAGEAL REFLUX DISEASE, ESOPHAGITIS PRESENCE NOT SPECIFIED: ICD-10-CM

## 2020-02-20 LAB
ALBUMIN SERPL-MCNC: 4.2 G/DL (ref 3.5–5)
ALBUMIN/GLOB SERPL: 1.3 {RATIO} (ref 1.1–2.2)
ALP SERPL-CCNC: 46 U/L (ref 45–117)
ALT SERPL-CCNC: 17 U/L (ref 12–78)
ANION GAP SERPL CALC-SCNC: 0 MMOL/L (ref 5–15)
AST SERPL-CCNC: 17 U/L (ref 15–37)
BILIRUB SERPL-MCNC: 0.2 MG/DL (ref 0.2–1)
BUN SERPL-MCNC: 7 MG/DL (ref 6–20)
BUN/CREAT SERPL: 8 (ref 12–20)
CALCIUM SERPL-MCNC: 9.1 MG/DL (ref 8.5–10.1)
CHLORIDE SERPL-SCNC: 103 MMOL/L (ref 97–108)
CHOLEST SERPL-MCNC: 168 MG/DL
CO2 SERPL-SCNC: 32 MMOL/L (ref 21–32)
CREAT SERPL-MCNC: 0.93 MG/DL (ref 0.55–1.02)
ERYTHROCYTE [DISTWIDTH] IN BLOOD BY AUTOMATED COUNT: 14.6 % (ref 11.5–14.5)
GLOBULIN SER CALC-MCNC: 3.2 G/DL (ref 2–4)
GLUCOSE SERPL-MCNC: 90 MG/DL (ref 65–100)
HCT VFR BLD AUTO: 42.1 % (ref 35–47)
HDLC SERPL-MCNC: 78 MG/DL
HDLC SERPL: 2.2 {RATIO} (ref 0–5)
HGB BLD-MCNC: 13.5 G/DL (ref 11.5–16)
LDLC SERPL CALC-MCNC: 74.6 MG/DL (ref 0–100)
LIPID PROFILE,FLP: NORMAL
MCH RBC QN AUTO: 30.5 PG (ref 26–34)
MCHC RBC AUTO-ENTMCNC: 32.1 G/DL (ref 30–36.5)
MCV RBC AUTO: 95.2 FL (ref 80–99)
NRBC # BLD: 0 K/UL (ref 0–0.01)
NRBC BLD-RTO: 0 PER 100 WBC
PLATELET # BLD AUTO: 179 K/UL (ref 150–400)
PMV BLD AUTO: 11.5 FL (ref 8.9–12.9)
POTASSIUM SERPL-SCNC: 3.5 MMOL/L (ref 3.5–5.1)
PROT SERPL-MCNC: 7.4 G/DL (ref 6.4–8.2)
RBC # BLD AUTO: 4.42 M/UL (ref 3.8–5.2)
SODIUM SERPL-SCNC: 135 MMOL/L (ref 136–145)
TRIGL SERPL-MCNC: 77 MG/DL (ref ?–150)
TSH SERPL DL<=0.05 MIU/L-ACNC: 1.04 UIU/ML (ref 0.36–3.74)
VLDLC SERPL CALC-MCNC: 15.4 MG/DL
WBC # BLD AUTO: 5.7 K/UL (ref 3.6–11)

## 2020-02-20 RX ORDER — AMOXICILLIN AND CLAVULANATE POTASSIUM 875; 125 MG/1; MG/1
1 TABLET, FILM COATED ORAL EVERY 12 HOURS
Qty: 14 TAB | Refills: 0 | Status: SHIPPED | OUTPATIENT
Start: 2020-02-20 | End: 2020-02-27

## 2020-02-20 NOTE — PATIENT INSTRUCTIONS
1. I sent a prescription for Augmentin to [facility] for your tooth abscess. Please try to get in with a Dentist as soon as you are able to make sure this is taken care of for you. 2. I will call [facility] about getting you the supplies you need at home. 3. I will call [facility] about the famotidine to see what happened there  4. Please keep an eye on your blood pressure at home and call me if it is going 140 or higher on top or 90 or higher on the bottom. 5. Below is some information about Duloxetine and Pregabalin, both of which I think would be helpful for your pain. If you would like to try one of these, please give me a call. 6. I will let you know how your blood work looks. 7. We will get you back in with physical therapy to help with your pain and use of your arm. Duloxetine (By mouth)   Duloxetine (doo-LOX-e-teen)  Treats depression, anxiety, diabetic peripheral neuropathy, fibromyalgia, and chronic muscle or bone pain. This medicine is an SSNRI. Brand Name(s): Cymbalta, DermacinRx DPN Graham, Irenka   There may be other brand names for this medicine. When This Medicine Should Not Be Used: This medicine is not right for everyone. Do not use it if you had an allergic reaction to duloxetine. How to Use This Medicine:   Capsule, Delayed Release Capsule  · Take your medicine as directed. Your dose may need to be changed several times to find what works best for you. · Delayed-release capsule: Swallow the capsule whole. Do not crush, chew, break, or open it. · This medicine should come with a Medication Guide. Ask your pharmacist for a copy if you do not have one. · Missed dose: Take a dose as soon as you remember. If it is almost time for your next dose, wait until then and take a regular dose. Do not take extra medicine to make up for a missed dose. · Store the medicine in a closed container at room temperature, away from heat, moisture, and direct light.   Drugs and Foods to Avoid:   Ask your doctor or pharmacist before using any other medicine, including over-the-counter medicines, vitamins, and herbal products. · Do not take duloxetine if you have used an MAO inhibitor (MAOI) within the past 14 days. Do not start taking an MAO inhibitor within 5 days of stopping duloxetine. · Some medicines can affect how duloxetine works. Tell your doctor if you are using any of the following:  ¨ Buspirone, cimetidine, ciprofloxacin, enoxacin, fentanyl, lithium, Wojciech's wort, theophylline, tramadol, tryptophan, or warfarin  ¨ Amphetamines  ¨ Blood pressure medicine  ¨ Diuretic (water pill)  ¨ Medicine for heart rhythm problems (including flecainide, propafenone, quinidine)  ¨ Medicine to treat migraine headaches (including triptans)  ¨ NSAID pain or arthritis medicine (including aspirin, celecoxib, diclofenac, ibuprofen, naproxen)  ¨ Other medicine to treat depression or mood disorders (including amitriptyline, desipramine, fluoxetine, imipramine, nortriptyline, paroxetine)  ¨ Phenothiazine medicine (including thioridazine)  · Tell your doctor if you use anything else that makes you sleepy. Some examples are allergy medicine, narcotic pain medicine, and alcohol. · Do not drink alcohol while you are using this medicine. Warnings While Using This Medicine:   · Tell your doctor if you are pregnant or breastfeeding, or if you have kidney disease, liver disease, diabetes, digestion problems, glaucoma, heart disease, high or low blood pressure, or problems with urination. Tell your doctor if you smoke or you have a history of seizures, or drug or alcohol addiction. · This medicine may cause the following problems:   ¨ Serious liver problems  ¨ Serotonin syndrome (more likely when used with certain other medicines)  ¨ Increased risk of bleeding problems  ¨ Serious skin reactions  ¨ Low sodium levels in the blood  · This medicine can increase thoughts of suicide.  Tell your doctor right away if you start to feel depressed and have thoughts about hurting yourself. · This medicine can cause changes in your blood pressure. This may make you dizzy or drowsy. Do not drive or do anything that could be dangerous until you know how this medicine affects you. Stand up slowly to avoid falls. · Do not stop using this medicine suddenly. Your doctor will need to slowly decrease your dose before you stop it completely. · Your doctor will check your progress and the effects of this medicine at regular visits. Keep all appointments. · Keep all medicine out of the reach of children. Never share your medicine with anyone. Possible Side Effects While Using This Medicine:   Call your doctor right away if you notice any of these side effects:  · Allergic reaction: Itching or hives, swelling in your face or hands, swelling or tingling in your mouth or throat, chest tightness, trouble breathing  · Anxiety, restlessness, fever, fast heartbeat, sweating, muscle spasms, diarrhea, seeing or hearing things that are not there  · Blistering, peeling, red skin rash  · Confusion, weakness, muscle twitching  · Dark urine or pale stools, nausea, vomiting, loss of appetite, stomach pain, yellow skin or eyes  · Decrease in how much or how often you urinate  · Eye pain, vision changes, seeing halos around lights  · Feeling more energetic than usual  · Lightheadedness, dizziness, or fainting  · Unusual moods or behaviors, worsening depression, thoughts about hurting yourself, trouble sleeping  · Unusual bleeding or bruising  If you notice these less serious side effects, talk with your doctor:   · Decrease in appetite or weight  · Dry mouth, constipation, mild nausea  · Unusual drowsiness, sleepiness, or tiredness  If you notice other side effects that you think are caused by this medicine, tell your doctor. Call your doctor for medical advice about side effects.  You may report side effects to FDA at 1-119-FDA-5613  © 2017 [employer] Information is for End User's use only and may not be sold, redistributed or otherwise used for commercial purposes. The above information is an  only. It is not intended as medical advice for individual conditions or treatments. Talk to your doctor, nurse or pharmacist before following any medical regimen to see if it is safe and effective for you. Pregabalin (By mouth)   Pregabalin (pre-GA-ba-freddie)  Treats nerve and muscle pain, including fibromyalgia. Also treats seizures. Brand Name(s): Lyrica   There may be other brand names for this medicine. When This Medicine Should Not Be Used: This medicine is not right for everyone. Do not use it if you had an allergic reaction to pregabalin. How to Use This Medicine:   Capsule, Liquid  · Take your medicine as directed. Your dose may need to be changed several times to find what works best for you. · Measure the oral liquid medicine with a marked measuring spoon, oral syringe, or medicine cup. · This medicine should come with a Medication Guide. Ask your pharmacist for a copy if you do not have one. · Missed dose: Take a dose as soon as you remember. If it is almost time for your next dose, wait until then and take a regular dose. Do not take extra medicine to make up for a missed dose. · Store the medicine in a closed container at room temperature, away from heat, moisture, and direct light. Drugs and Foods to Avoid:   Ask your doctor or pharmacist before using any other medicine, including over-the-counter medicines, vitamins, and herbal products. · Some medicines can affect how pregabalin works. Tell your doctor if you are using any of the following:   ¨ Diabetes medicine that you take by mouth (pioglitazone, rosiglitazone)  ¨ An ACE inhibitor (benazepril, enalapril, lisinopril, quinapril, ramipril)  · Tell your doctor if you use anything else that makes you sleepy.  Some examples are allergy medicine, narcotic pain medicine, and alcohol. Warnings While Using This Medicine:   · Tell your doctor if you are pregnant or breastfeeding, or if you have kidney disease, heart failure, heart rhythm problems, angioedema, a bleeding disorder, or a low blood platelet count. Tell your doctor if you have a history of alcohol or drug abuse, depression, or other mood problems. · This medicine may cause the following problems:   ¨ Serious allergic reactions  ¨ Suicidal thoughts  · This medicine may make you dizzy or drowsy. It may also cause blurry or double vision. Do not drive or do anything that could be dangerous until you know how this medicine affects you. · Do not stop using this medicine suddenly. Your doctor will need to slowly decrease your dose before you stop it completely. · Your doctor will check your progress and the effects of this medicine at regular visits. Keep all appointments. · Keep all medicine out of the reach of children. Never share your medicine with anyone.   Possible Side Effects While Using This Medicine:   Call your doctor right away if you notice any of these side effects:  · Allergic reaction: Itching or hives, swelling in your face or hands, swelling or tingling in your mouth or throat, chest tightness, trouble breathing  · Blistering, peeling, red skin rash  · Blurry or double vision  · Fever, chills, cough, sore throat, and body aches  · Muscle pain, tenderness, or weakness, fever, or general ill feeling  · Rapid weight gain, swelling in your hands, ankles, or feet  · Severe dizziness or drowsiness  · Sudden mood changes, unusual thoughts or behavior such as extreme happiness or depression  · Suicidal thoughts  · Swelling in your throat, head, or neck  · Uneven heartbeat  · Unusual bleeding, bruising, or weakness  If you notice these less serious side effects, talk with your doctor:   · Confusion, trouble concentrating  · Constipation  · Dry mouth  If you notice other side effects that you think are caused by this medicine, tell your doctor. Call your doctor for medical advice about side effects. You may report side effects to FDA at 8-825-UYI-1633  © 2017 [employer] [employer] Information is for End User's use only and may not be sold, redistributed or otherwise used for commercial purposes. The above information is an  only. It is not intended as medical advice for individual conditions or treatments. Talk to your doctor, nurse or pharmacist before following any medical regimen to see if it is safe and effective for you.

## 2020-02-20 NOTE — PROGRESS NOTES
1. Have you been to the ER, urgent care clinic since your last visit? Hospitalized since your last visit? No    2. Have you seen or consulted any other health care providers outside of the [address] [place] [address] since your last visit? Include any pap smears or colon screening.  No  Reviewed record in preparation for visit and have necessary documentation  Pt did not bring medication to office visit for review    Goals that were addressed and/or need to be completed during or after this appointment include     Health Maintenance Due   Topic Date Due    DTaP/Tdap/Td series (1 - Tdap) 02/22/1992    Influenza Age 5 to Adult  08/01/2019    Lipid Screen  02/11/2020

## 2020-02-20 NOTE — PROGRESS NOTES
CC: f/u HTN, chronic pain    HPI: Pt is a 46 y.o. female who presents for f/u HTN. She is a poor historian 2/2 brain damage from stroke. Her mood is labile at baseline and her history is tangential. She has not been checking her BP at home but does have a cuff. She has been having pain in her R jaw and ear for about a week and is worried she may have a tooth abscess. She thinks she broke the tooth a while ago. She is looking into going to [employer] but hasn't been able to get there yet. No fevers or drainage but she has pain and swelling in her R upper jaw. She has been seeing Psychology, Liv Redding. She suffers with depression related to her stroke, inability to do the things she used to do, and chronic pain on the left side of her body. She had been requesting narcotics in the past and was referred to Pain Management for evaluation of this, but decided she did not want to take any narcotic medication and so did not go. She has been prescribed Elavil and gabapentin in the past but feels like they did not work and it is unclear how long she took them prior to deciding this. She is wary of medications in general. We had discussed Lyrica at her last visit but she became upset and declined any new medication at that time. She needs some supplies in the home. We have ordered these on several occasions but she has not gotten them yet, possibly 2/2 missed communications from the DME supplier. She has chronic pain and weakness on her entire left side since the stroke and has gait instability related to this. She would be safer walking with a four-pronged cane. She is at risk for falls in the shower and would benefit from a shower chair. At night she has bad pain if she sleeps on the left side and it was suggested to her by PT in the past that she use a wedge to keep her from putting too much pressure on this left side. She is interested in going back to PT.  She cannot drive and it is difficult for her to get out of the house, but she does not want anyone coming in to the home. She would prefer to go to the local PT, even if she has to walk or have a family member drive her. She was not able to get her Pepcid filled at the pharmacy but she does not know why. I spoke with the pharmacy after her visit and they said they could not get the 20mg tablets in so had given her the 40mg tablets to cut in half. They said she did fill the rx, however she did not fill her Prilosec which they normally do for her. Past Medical History:   Diagnosis Date    Depression     GERD (gastroesophageal reflux disease)     Headache     Hypertension     Neuropathic pain of forearm, left     Neuropathic pain of left lower extremity     Stroke [facility]) 11/2017 2017       Family History   Problem Relation Age of Onset    Schizophrenia Mother     Depression Mother     Anxiety Mother     Asthma Mother     Crohn's Disease Mother     Stroke Father     Hypertension Father     No Known Problems Sister     No Known Problems Brother     Other Son         pyloric stenosis    Cancer Maternal Grandfather         Lung    Other Paternal Grandfather         Aneursym    Colon Cancer Maternal Uncle     Cancer Maternal Uncle         Brain cancer       Social History     Tobacco Use    Smoking status: Former Smoker     Packs/day: 1.80     Years: 10.00     Pack years: 18.00     Types: Cigarettes    Smokeless tobacco: Never Used   Substance Use Topics    Alcohol use: No    Drug use: Yes     Types: Marijuana       ROS:  Per HPI    PE:  Visit Vitals  /84   Pulse 73   Temp 99 °F (37.2 °C) (Oral)   Resp 20   Ht 5' 5\" (1.651 m)   Wt 155 lb 3.2 oz (70.4 kg)   LMP 02/10/2020 (Approximate)   SpO2 99%   BMI 25.83 kg/m²     Gen: Pt sitting in chair, in NAD  Head: Normocephalic, atraumatic  Eyes: Sclera anicteric, EOM grossly intact, PERRL  Throat: MMM, normal lips, tongue, teeth and gums.  Mild swelling of the gum above right upper molars. Neck: Supple  CVS: Normal S1, S2, no m/r/g  Resp: CTAB, no wheezes or rales  Extrem: Atraumatic, no cyanosis or edema. Pulses: 2+   Skin: Warm, dry  Neuro: Alert. Chronic weakness in the left arm and leg. Gait unstable, does not use an aid. Psych: Tearful on and off during interview      A/P:   Encounter Diagnoses     ICD-10-CM ICD-9-CM   1. Essential hypertension I10 401.9   2. Gastroesophageal reflux disease, esophagitis presence not specified K21.9 530.81   3. Mixed hyperlipidemia E78.2 272.2   4. Other fatigue R53.83 780.79   5. Tooth abscess K04.7 522.5   6. H/O: stroke Z86.73 V12.54   7. Left-sided weakness R53.1 728.87     1. Gastroesophageal reflux disease, esophagitis presence not specified: Clarified with pharmacy the issue with famotidine and will advise pt. 2. Essential hypertension:   - Pt to continue checking BP at home and will call if >140/90  - CBC W/O DIFF; Future  - TSH 3RD GENERATION; Future  - METABOLIC PANEL, COMPREHENSIVE; Future    3. Mixed hyperlipidemia  - LIPID PANEL; Future    4. Other fatigue  - CBC W/O DIFF; Future  - TSH 3RD GENERATION; Future    5. Tooth abscess  - amoxicillin-clavulanate (AUGMENTIN) 875-125 mg per tablet; Take 1 Tab by mouth every twelve (12) hours for 7 days. Dispense: 14 Tab; Refill: 0    6. H/O: stroke  - REFERRAL TO PHYSICAL THERAPY    7. Left-sided weakness  - REFERRAL TO PHYSICAL THERAPY    8. Chronic pain: Discussed options of Lyrica and Cymbalta. She will continue to think about it. RTC in 3 months for f/u chronic conditions, or sooner prn    Over 50% of the 50 minutes face to face with Enriqueta Varner consisted of counseling and/or discussing treatment plans in reference to her chronic pain and HTN. Discussed diagnoses in detail with patient. Medication risks/benefits/side effects discussed with patient. All of the patient's questions were addressed. The patient understands and agrees with our plan of care.   The patient knows to call back if they are unsure of or forget any changes we discussed today or if the symptoms change. The patient received an After-Visit Summary which contains VS, orders, medication list and allergy list. This can be used as a \"mini-medical record\" should they have to seek medical care while out of town. Current Outpatient Medications on File Prior to Visit   Medication Sig Dispense Refill    lisinopril (PRINIVIL, ZESTRIL) 10 mg tablet TAKE 1 TABLET BY MOUTH NIGHTLY 30 Tab 5    atenoloL (TENORMIN) 50 mg tablet TAKE 1 TABLET BY MOUTH DAILY 30 Tab 5    atorvastatin (LIPITOR) 40 mg tablet TAKE ONE TABLET BY MOUTH EVERY DAY 30 Tab 5    PRILOSEC OTC 20 mg tablet TAKE ONE TABLET BY MOUTH EVERY DAY 28 Tab 5    hydroCHLOROthiazide (HYDRODIURIL) 25 mg tablet TAKE 1 TABLET BY MOUTH DAILY 30 Tab 5    cetirizine (ZYRTEC) 1 mg/mL solution Take 10 mL by mouth nightly. Indications: inflammation of the nose due to an allergy 1 Bottle 3    aspirin 81 mg chewable tablet CHEW AND SWALLOW ONE TABLET BY MOUTH EVERY DAY 30 Tab 3    triamcinolone acetonide (KENALOG) 0.1 % topical cream   3    ammonium lactate (AMLACTIN) 12 % topical cream   3    montelukast (SINGULAIR) 10 mg tablet Take 1 Tab by mouth daily. 30 Tab 3    fluticasone (FLONASE) 50 mcg/actuation nasal spray 2 Sprays by Both Nostrils route daily. 1 Bottle 3    famotidine (PEPCID) 20 mg tablet TAKE 1 TABLET BY MOUTH NIGHTLY 30 Tab 5     No current facility-administered medications on file prior to visit.

## 2020-02-21 ENCOUNTER — TELEPHONE (OUTPATIENT)
Dept: FAMILY MEDICINE CLINIC | Age: 40
End: 2020-02-21

## 2020-02-21 NOTE — TELEPHONE ENCOUNTER
Attempted to call patient. No answer. Did not leave voice mail message as name on recording is not patient's.

## 2020-02-21 NOTE — TELEPHONE ENCOUNTER
Called to advise pt of recent results. 1. Cholesterol looks good. 2. Thyroid function is normal.  3. She is not anemic. 4. Rest of labs look good. I did get the paperwork she brought back from [place] and I think she would be a good candidate for this. I will look into it more. I talked to [facility]. They did not have the famotidine 20mg tabs so they gave her the 40mg tabs and she can cut them in half. They did just fill her Prilosec as well, so that is available for her to . I am working on getting her the equipment she needs.

## 2020-02-21 NOTE — TELEPHONE ENCOUNTER
Pt returned call. Please call back. [phone] because she is not understanding the feeling she has if her anemic is good. Please give her a call.

## 2020-02-28 ENCOUNTER — TELEPHONE (OUTPATIENT)
Dept: FAMILY MEDICINE CLINIC | Age: 40
End: 2020-02-28

## 2020-02-28 NOTE — TELEPHONE ENCOUNTER
Caroline Colmenares from [facility] called and says that they are Out of Network for this Pt for the [address].  If you need more info, please call

## 2020-03-03 NOTE — TELEPHONE ENCOUNTER
Attempted to call patient. No answer. Need patient to call her insurance company and ask them which medical equipment company is in Network.

## 2020-04-14 DIAGNOSIS — K21.9 GASTROESOPHAGEAL REFLUX DISEASE, ESOPHAGITIS PRESENCE NOT SPECIFIED: Primary | ICD-10-CM

## 2020-04-14 RX ORDER — ZINC GLUCONATE 13.3 MG
200 LOZENGE ORAL 2 TIMES DAILY
Qty: 60 TAB | Refills: 3 | Status: SHIPPED | OUTPATIENT
Start: 2020-04-14 | End: 2020-08-17

## 2020-06-01 DIAGNOSIS — I63.9 CEREBROVASCULAR ACCIDENT (CVA), UNSPECIFIED MECHANISM (HCC): ICD-10-CM

## 2020-07-15 ENCOUNTER — TELEPHONE (OUTPATIENT)
Dept: FAMILY MEDICINE CLINIC | Age: 40
End: 2020-07-15

## 2020-07-15 DIAGNOSIS — Z86.73 H/O: CVA (CEREBROVASCULAR ACCIDENT): ICD-10-CM

## 2020-07-15 DIAGNOSIS — R53.1 LEFT-SIDED WEAKNESS: Primary | ICD-10-CM

## 2020-07-15 NOTE — TELEPHONE ENCOUNTER
Pt states she has a referral for Physical Therapist early this year. She went to make an appointment but they stated need a up to date referral. Dr Maron Cooks is her PCP no appointments available for the next 2wks.     Please call pt to discuss at [phone]

## 2020-07-21 ENCOUNTER — TELEPHONE (OUTPATIENT)
Dept: FAMILY MEDICINE CLINIC | Age: 40
End: 2020-07-21

## 2020-07-23 ENCOUNTER — VIRTUAL VISIT (OUTPATIENT)
Dept: FAMILY MEDICINE CLINIC | Age: 40
End: 2020-07-23

## 2020-07-23 DIAGNOSIS — Z98.890 HISTORY OF RECENT DENTAL PROCEDURE: Primary | ICD-10-CM

## 2020-07-23 RX ORDER — CLINDAMYCIN HYDROCHLORIDE 300 MG/1
300 CAPSULE ORAL 3 TIMES DAILY
Qty: 21 CAP | Refills: 0 | Status: SHIPPED | OUTPATIENT
Start: 2020-07-23 | End: 2020-07-30

## 2020-07-23 NOTE — PROGRESS NOTES
Terry Christopher  45 y.o. female  1980  Jackie Brasher  [id number]    [phone] (home)      [address]:    Telephone Encounter  Ad[facility]       Encounter Date: 7/23/2020    Consent:  She and/or the health care decision maker is aware that that she may receive a bill for this telephone service, depending on her insurance coverage, and has provided verbal consent to proceed: Yes    Chief Complaint   Patient presents with    Rash       History of Present Illness   Terry Christopher is a 45 y.o. female was evaluated by telephone. Details of this discussion including any medical advice provided:     46 y/o female with pmx of CVA with residual left sided weakness and brain damage, nuropathic pain, HTN, GERD who is calling complaining of a rash. She went to the dentist yesterday and he took removed two teeth and gave her antibiotics and pain medicine. She woke up with hives on her arms this morning and stopped taking the Amoxicillin. Took Benadryl which has helped. The rash is gone now. She tried calling the dentist's office for a different antibiotic but hasn't gotten a call back. Of note she has taken amoxicillin in the past without any reaction (last was Augmentin 5 months ago). She denies any new foods, detergents, or products. Has not been outside or in the grass. Denies cp, sob, throat swelling, abd pain, n/v/d, difficulty swallowing. Review of Systems   Review of Systems   Constitutional: Negative for chills, fever, malaise/fatigue and weight loss. HENT: Negative for congestion, hearing loss, sinus pain and sore throat. Eyes: Negative for blurred vision, double vision and pain. Respiratory: Negative for cough, sputum production, shortness of breath and wheezing. Cardiovascular: Negative for chest pain, palpitations and leg swelling. Gastrointestinal: Negative for abdominal pain, heartburn, nausea and vomiting.    Genitourinary: Negative for dysuria and urgency. Musculoskeletal: Negative for back pain, myalgias and neck pain. Skin: Positive for itching and rash. Neurological: Negative for dizziness, weakness and headaches. Endo/Heme/Allergies: Does not bruise/bleed easily. Psychiatric/Behavioral: Negative for depression and suicidal ideas. Vitals/Objective:     General: alert, cooperative, no distress   Mental  status: mental status: alert, oriented to person, place, and time, normal mood, behavior, speech, dress, motor activity, and thought processes   Resp: resp: normal effort and no respiratory distress   Neuro: neuro: no gross deficits   Skin: Unable to assess via telephone call   Due to this being a TeleHealth evaluation, many elements of the physical examination are unable to be assessed. Assessment and Plan:   Time-based coding, delete if not needed: I spent at least 15 minutes with this established patient, and >50% of the time was spent counseling and/or coordinating care regarding rash  Total Time: minutes: 11-20 minutes    1. History of recent dental procedure: Patient had two teeth removed yesterday and was prescribed 7 days of Amoxicillin. She took a dose yesterday and woke up this morning with hives on her arms. Stopped taking the amox and started taking Benadryl, and the rash has gone away. Patient has previously tolerated amoxicillin. She attempted to call dentist's office but hasn't gotten a call back. -Advised to STOP Amoxicillin  - clindamycin (CLEOCIN) 300 mg capsule; Take 1 Cap by mouth three (3) times daily for 7 days. Dispense: 21 Cap; Refill: 0  -Follow up with dentist as needed    2. Rash: Likely from antibiotic use, no other new exposures.    -Advised to stop taking Amoxicillin  -Benadryl prn   --Advised to call back if rash returns and go to ED if she begins to experience shortness of breath, throat or lip swelling      I affirm this is a Patient Initiated Episode with an Established Patient who has not had a related appointment within my department in the past 7 days or scheduled within the next 24 hours. Note: not billable if this call serves to triage the patient into an appointment for the relevant concern     We discussed the expected course, resolution and complications of the diagnosis(es) in detail. Medication risks, benefits, costs, interactions, and alternatives were discussed as indicated. I advised her to contact the office if her condition worsens, changes or fails to improve as anticipated. She expressed understanding with the diagnosis(es) and plan. Patient understands that this encounter was a temporary measure, and the importance of further follow up and examination was emphasized. Patient verbalized understanding. Electronically Signed: Martina Ritchie DO    Providers location when delivering service: home    CPT:  44183 (5-10 minutes)  34465 (11-20 minutes)  21  (21-30 minutes)      ICD-10-CM ICD-9-CM    1. History of recent dental procedure  Z98.890 V15.29 clindamycin (CLEOCIN) 300 mg capsule       Pursuant to the emergency declaration under the [facility], [facility] waiver authority and the [employer] Act, this Virtual  Visit was conducted, with patient's consent, to reduce the patient's risk of exposure to COVID-19 and provide continuity of care for an established patient. Services were provided through a video synchronous discussion virtually to substitute for in-person clinic visit. History   Patients past medical, surgical and family histories were personally reviewed and updated.       Past Medical History:   Diagnosis Date    Depression     GERD (gastroesophageal reflux disease)     Headache     Hypertension     Neuropathic pain of forearm, left     Neuropathic pain of left lower extremity     Stroke [facility]) 11/2017 2017     Past Surgical History:   Procedure Laterality Date    CARDIAC SURG PROCEDURE UNLIST  2018    implantable loop recorder for A fib    HX DILATION AND CURETTAGE      D & C     Family History   Problem Relation Age of Onset   Erlinda Cutlerst Schizophrenia Mother     Depression Mother     Anxiety Mother     Asthma Mother     Crohn's Disease Mother     Stroke Father     Hypertension Father     No Known Problems Sister     No Known Problems Brother     Other Son         pyloric stenosis    Cancer Maternal Grandfather         Lung    Other Paternal Grandfather         Aneursym    Colon Cancer Maternal Uncle     Cancer Maternal Uncle         Brain cancer     Social History     Socioeconomic History    Marital status: SINGLE     Spouse name: Not on file    Number of children: Not on file    Years of education: Not on file    Highest education level: Not on file   Occupational History    Not on file   Social Needs    Financial resource strain: Not on file    Food insecurity     Worry: Not on file     Inability: Not on file    Transportation needs     Medical: Not on file     Non-medical: Not on file   Tobacco Use    Smoking status: Former Smoker     Packs/day: 1.80     Years: 10.00     Pack years: 18.00     Types: Cigarettes    Smokeless tobacco: Never Used   Substance and Sexual Activity    Alcohol use: No    Drug use: Yes     Types: Marijuana    Sexual activity: Yes     Partners: Male     Birth control/protection: Surgical   Lifestyle    Physical activity     Days per week: Not on file     Minutes per session: Not on file    Stress: Not on file   Relationships    Social connections     Talks on phone: Not on file     Gets together: Not on file     Attends Restoration service: Not on file     Active member of club or organization: Not on file     Attends meetings of clubs or organizations: Not on file     Relationship status: Not on file    Intimate partner violence     Fear of current or ex partner: Not on file     Emotionally abused: Not on file     Physically abused: Not on file     Forced sexual activity: Not on file   Other Topics Concern    Not on file   Social History Narrative    Not on file            Current Medications/Allergies   Medications and Allergies reviewed:    Current Outpatient Medications   Medication Sig Dispense Refill    clindamycin (CLEOCIN) 300 mg capsule Take 1 Cap by mouth three (3) times daily for 7 days. 21 Cap 0    cimetidine (TAGAMET) 200 mg tab Take 1 Tab by mouth two (2) times a day. 60 Tab 3    cetirizine (ZYRTEC) 1 mg/mL solution TAKE 10 ML BY MOUTH NIGHTLY 300 mL 5    aspirin 81 mg chewable tablet CHEW AND SWALLOW ONE TABLET BY MOUTH EVERY DAY 90 Tab 1    lisinopril (PRINIVIL, ZESTRIL) 10 mg tablet TAKE 1 TABLET BY MOUTH NIGHTLY 30 Tab 5    atenoloL (TENORMIN) 50 mg tablet TAKE 1 TABLET BY MOUTH DAILY 30 Tab 5    atorvastatin (LIPITOR) 40 mg tablet TAKE ONE TABLET BY MOUTH EVERY DAY 30 Tab 5    PRILOSEC OTC 20 mg tablet TAKE ONE TABLET BY MOUTH EVERY DAY 28 Tab 5    hydroCHLOROthiazide (HYDRODIURIL) 25 mg tablet TAKE 1 TABLET BY MOUTH DAILY 30 Tab 5    triamcinolone acetonide (KENALOG) 0.1 % topical cream   3    ammonium lactate (AMLACTIN) 12 % topical cream   3    montelukast (SINGULAIR) 10 mg tablet Take 1 Tab by mouth daily. 30 Tab 3    fluticasone (FLONASE) 50 mcg/actuation nasal spray 2 Sprays by Both Nostrils route daily.  1 Bottle 3     Allergies   Allergen Reactions    Carvedilol Hives    Tramadol Nausea Only     Headaches  Patient states not allergic

## 2020-08-17 ENCOUNTER — OFFICE VISIT (OUTPATIENT)
Dept: FAMILY MEDICINE CLINIC | Age: 40
End: 2020-08-17
Payer: MEDICARE

## 2020-08-17 VITALS
HEIGHT: 65 IN | RESPIRATION RATE: 18 BRPM | HEART RATE: 61 BPM | WEIGHT: 159.4 LBS | SYSTOLIC BLOOD PRESSURE: 150 MMHG | DIASTOLIC BLOOD PRESSURE: 86 MMHG | TEMPERATURE: 97.6 F | BODY MASS INDEX: 26.56 KG/M2 | OXYGEN SATURATION: 100 %

## 2020-08-17 DIAGNOSIS — E78.2 MIXED HYPERLIPIDEMIA: ICD-10-CM

## 2020-08-17 DIAGNOSIS — J01.00 ACUTE MAXILLARY SINUSITIS, RECURRENCE NOT SPECIFIED: ICD-10-CM

## 2020-08-17 DIAGNOSIS — Z86.73 H/O: CVA (CEREBROVASCULAR ACCIDENT): ICD-10-CM

## 2020-08-17 DIAGNOSIS — I10 ESSENTIAL HYPERTENSION: ICD-10-CM

## 2020-08-17 DIAGNOSIS — K21.9 GASTROESOPHAGEAL REFLUX DISEASE, ESOPHAGITIS PRESENCE NOT SPECIFIED: Primary | ICD-10-CM

## 2020-08-17 DIAGNOSIS — R53.1 LEFT-SIDED WEAKNESS: ICD-10-CM

## 2020-08-17 DIAGNOSIS — I10 ESSENTIAL HYPERTENSION: Primary | ICD-10-CM

## 2020-08-17 LAB
ALBUMIN SERPL-MCNC: 4.1 G/DL (ref 3.5–5)
ALBUMIN/GLOB SERPL: 1.2 {RATIO} (ref 1.1–2.2)
ALP SERPL-CCNC: 49 U/L (ref 45–117)
ALT SERPL-CCNC: 17 U/L (ref 12–78)
ANION GAP SERPL CALC-SCNC: 6 MMOL/L (ref 5–15)
AST SERPL-CCNC: 16 U/L (ref 15–37)
BILIRUB SERPL-MCNC: 0.2 MG/DL (ref 0.2–1)
BUN SERPL-MCNC: 11 MG/DL (ref 6–20)
BUN/CREAT SERPL: 12 (ref 12–20)
CALCIUM SERPL-MCNC: 9 MG/DL (ref 8.5–10.1)
CHLORIDE SERPL-SCNC: 105 MMOL/L (ref 97–108)
CO2 SERPL-SCNC: 28 MMOL/L (ref 21–32)
CREAT SERPL-MCNC: 0.92 MG/DL (ref 0.55–1.02)
GLOBULIN SER CALC-MCNC: 3.3 G/DL (ref 2–4)
GLUCOSE SERPL-MCNC: 74 MG/DL (ref 65–100)
POTASSIUM SERPL-SCNC: 4 MMOL/L (ref 3.5–5.1)
PROT SERPL-MCNC: 7.4 G/DL (ref 6.4–8.2)
SODIUM SERPL-SCNC: 139 MMOL/L (ref 136–145)

## 2020-08-17 PROCEDURE — G8427 DOCREV CUR MEDS BY ELIG CLIN: HCPCS | Performed by: FAMILY MEDICINE

## 2020-08-17 PROCEDURE — G8419 CALC BMI OUT NRM PARAM NOF/U: HCPCS | Performed by: FAMILY MEDICINE

## 2020-08-17 PROCEDURE — G8754 DIAS BP LESS 90: HCPCS | Performed by: FAMILY MEDICINE

## 2020-08-17 PROCEDURE — G8753 SYS BP > OR = 140: HCPCS | Performed by: FAMILY MEDICINE

## 2020-08-17 PROCEDURE — 99215 OFFICE O/P EST HI 40 MIN: CPT | Performed by: FAMILY MEDICINE

## 2020-08-17 PROCEDURE — G9717 DOC PT DX DEP/BP F/U NT REQ: HCPCS | Performed by: FAMILY MEDICINE

## 2020-08-17 RX ORDER — LISINOPRIL 20 MG/1
20 TABLET ORAL DAILY
Qty: 30 TAB | Refills: 3 | Status: SHIPPED | OUTPATIENT
Start: 2020-08-17 | End: 2021-03-31

## 2020-08-17 RX ORDER — FAMOTIDINE 40 MG/1
40 TABLET, FILM COATED ORAL DAILY
Qty: 30 TAB | Refills: 5 | Status: SHIPPED | OUTPATIENT
Start: 2020-08-17 | End: 2020-10-20

## 2020-08-17 RX ORDER — DOXYCYCLINE 100 MG/1
100 CAPSULE ORAL 2 TIMES DAILY
Qty: 14 CAP | Refills: 0 | Status: SHIPPED | OUTPATIENT
Start: 2020-08-17 | End: 2020-08-24

## 2020-08-17 RX ORDER — HYDROCHLOROTHIAZIDE 25 MG/1
TABLET ORAL
Qty: 30 TAB | Refills: 5 | Status: SHIPPED | OUTPATIENT
Start: 2020-08-17 | End: 2021-05-10

## 2020-08-17 NOTE — PROGRESS NOTES
Chief Complaint   Patient presents with    Well Woman     Visit Vitals  /86 (BP 1 Location: Right arm, BP Patient Position: Sitting)   Pulse 61   Temp 97.6 °F (36.4 °C) (Temporal)   Resp 18   Ht 5' 5\" (1.651 m)   Wt 159 lb 6.4 oz (72.3 kg)   LMP 08/17/2020 (Approximate)   SpO2 100%   BMI 26.53 kg/m²     1. Have you been to the ER, urgent care clinic since your last visit? Hospitalized since your last visit? No    2. Have you seen or consulted any other health care providers outside of the [address] [place] [address] since your last visit? Include any pap smears or colon screening.  No    Reviewed record in preparation for visit and have necessary documentation  Pt did not bring medication to office visit for review  opportunity was given for questions  Goals that were addressed and/or need to be completed during or after this appointment include   Health Maintenance Due   Topic Date Due    DTaP/Tdap/Td series (1 - Tdap) 02/22/2002    Influenza Age 5 to Adult  08/01/2020

## 2020-08-17 NOTE — PROGRESS NOTES
CC: Follow-up    HPI: Pt is a 45 y.o. female who presents for follow-up. She had some teeth pulled a month ago and may have had a reaction to the amoxicillin. She was seen here and given Clindamycin which she tolerated well. The pain is improved now but she continues to have pressure on that side of her mouth and is concerned she might still have some infection there. She has also been having some HA in the maxillary area with congestion and rhinorrhea, runny eyes. She has had Flonase and Singulair in the past for allergies but is not taking them currently. She has not been checking her BP at home. She is taking her lisinopril, HCTZ and atenolol as directed. She is feeling weaker on her left side. She had done PT for this in the past and it helped and she would like to go back. Sometimes her muscles feel stiff on that side as well. She has been having some vaginal discharge that was white and did not have an odor. She is currently menstruating. She would like to get a pelvic exam to check on this once her cycle is over.        Past Medical History:   Diagnosis Date    Depression     GERD (gastroesophageal reflux disease)     Headache     Hypertension     Neuropathic pain of forearm, left     Neuropathic pain of left lower extremity     Stroke [facility]) 11/2017 2017       Family History   Problem Relation Age of Onset    Schizophrenia Mother     Depression Mother     Anxiety Mother     Asthma Mother     Crohn's Disease Mother     Stroke Father     Hypertension Father     No Known Problems Sister     No Known Problems Brother     Other Son         pyloric stenosis    Cancer Maternal Grandfather         Lung    Other Paternal Grandfather         Aneursym    Colon Cancer Maternal Uncle     Cancer Maternal Uncle         Brain cancer       Social History     Tobacco Use    Smoking status: Former Smoker     Packs/day: 1.80     Years: 10.00     Pack years: 18.00     Types: Cigarettes    Smokeless tobacco: Never Used   Substance Use Topics    Alcohol use: No    Drug use: Yes     Types: Marijuana       ROS:  Per HPI    PE:  Visit Vitals  /86 (BP 1 Location: Right arm, BP Patient Position: Sitting)   Pulse 61   Temp 97.6 °F (36.4 °C) (Temporal)   Resp 18   Ht 5' 5\" (1.651 m)   Wt 159 lb 6.4 oz (72.3 kg)   LMP 08/17/2020 (Approximate)   SpO2 100%   BMI 26.53 kg/m²     Gen: Pt sitting in chair, in NAD  Head: Normocephalic, atraumatic  Eyes: Sclera anicteric, EOM grossly intact, PERRL  Ears: TM's pearly with good light reflex b/l  Nose: Normal nasal mucosa  Throat: MMM, normal lips, tongue, teeth and gums. No erythema, swelling or drainage from the gums. Neck: Supple, no LAD, no thyromegaly or carotid bruits  CVS: Normal S1, S2, no m/r/g  Resp: CTAB, no wheezes or rales  Extrem: Atraumatic, no cyanosis or edema  Pulses: 2+   Skin: Warm, dry  Neuro: Alert      A/P:   Encounter Diagnoses     ICD-10-CM ICD-9-CM   1. Essential hypertension  I10 401.9   2. Left-sided weakness  R53.1 728.87   3. Mixed hyperlipidemia  E78.2 272.2   4. Acute maxillary sinusitis, recurrence not specified  J01.00 461.0   5. H/O: CVA (cerebrovascular accident)  Z86.73 V12.54     1. Essential hypertension: BP elevated and has fluctuated in the past. Pt states she is taking all of her medication. Elevated BP could be contributing to her headaches. Recommend we increase lisinopril to 20mg daily for better BP control, especially with her h/o stroke. She is amenable to this. - METABOLIC PANEL, BASIC; Future  - lisinopriL (PRINIVIL, ZESTRIL) 20 mg tablet; Take 1 Tab by mouth daily. Dispense: 30 Tab; Refill: 3    2. Left-sided weakness: Will get her back in with PT  - REFERRAL TO PHYSICAL THERAPY    3. Mixed hyperlipidemia  - LIPID PANEL; Future    4.  Acute maxillary sinusitis, recurrence not specified: Pt advised to try Flonase for 2-3 days and if symptoms are not improving she can  the doxycycline.   - doxycycline (MONODOX) 100 mg capsule; Take 1 Cap by mouth two (2) times a day for 7 days. Dispense: 14 Cap; Refill: 0    5. H/O: CVA (cerebrovascular accident)       RTC in 1-2 weeks for pelvic exam for vaginal discharge    Over 50% of the 60 minutes face to face with Rachel Obando consisted of counseling and/or discussing treatment plans in reference to her stroke, residual weakness, HTN and HA. Discussed diagnoses in detail with patient. Medication risks/benefits/side effects discussed with patient. All of the patient's questions were addressed. The patient understands and agrees with our plan of care. The patient knows to call back if they are unsure of or forget any changes we discussed today or if the symptoms change. The patient received an After-Visit Summary which contains VS, orders, medication list and allergy list. This can be used as a \"mini-medical record\" should they have to seek medical care while out of town. Current Outpatient Medications on File Prior to Visit   Medication Sig Dispense Refill    cetirizine (ZYRTEC) 1 mg/mL solution TAKE 10 ML BY MOUTH NIGHTLY 300 mL 5    aspirin 81 mg chewable tablet CHEW AND SWALLOW ONE TABLET BY MOUTH EVERY DAY 90 Tab 1    atenoloL (TENORMIN) 50 mg tablet TAKE 1 TABLET BY MOUTH DAILY 30 Tab 5    atorvastatin (LIPITOR) 40 mg tablet TAKE ONE TABLET BY MOUTH EVERY DAY 30 Tab 5    PRILOSEC OTC 20 mg tablet TAKE ONE TABLET BY MOUTH EVERY DAY 28 Tab 5    hydroCHLOROthiazide (HYDRODIURIL) 25 mg tablet TAKE 1 TABLET BY MOUTH DAILY 30 Tab 5    triamcinolone acetonide (KENALOG) 0.1 % topical cream   3    ammonium lactate (AMLACTIN) 12 % topical cream   3    fluticasone (FLONASE) 50 mcg/actuation nasal spray 2 Sprays by Both Nostrils route daily. 1 Bottle 3     No current facility-administered medications on file prior to visit.

## 2020-08-17 NOTE — PATIENT INSTRUCTIONS
1. Try using the Flonase for your sinuses/headache. If this does not help after 2-3 days you can try the Doxycycline antibiotic that I sent to [facility] for you. 2. We will put in a referral to Physical therapy to work on strengthening your left side and keeping it from getting stiff. 3. We are increasing your lisinopril (nighttime blood pressure medicine) to 20mg. You can take two of the 10mg tabs until you run out and the new prescription for 20mg is at [facility] for you. 4. Please come back sometime next week when you are no longer on your period for us to do your pap.

## 2020-10-19 ENCOUNTER — TELEPHONE (OUTPATIENT)
Dept: FAMILY MEDICINE CLINIC | Age: 40
End: 2020-10-19

## 2020-10-19 NOTE — TELEPHONE ENCOUNTER
Attempted to call. Unsuccessful.   Message left  Will need to schedule an appointment to discuss symptoms and possible treatment

## 2020-10-19 NOTE — TELEPHONE ENCOUNTER
----- Message from Georgina Sommer sent at 10/19/2020  9:59 AM EDT -----  Regarding: Aneglo Capps MD/ telephone  General Message/Vendor Calls    Caller's first and last name: Amy Cleveland      Reason for call: Pt has an appt with the Dentist on 10/21 for tooth pain possible infection. Pt is requesting an antibiotic and pain medications to last her until then. Pts mouth and ear is hurting. Pt uses [facility] listed on chart. If an appt is needed pt would like a call back as soon as possible.        Callback required yes/no and why:      Best contact number(s): [phone]      Details to clarify the request:      Georgina Sommer

## 2020-10-20 ENCOUNTER — VIRTUAL VISIT (OUTPATIENT)
Dept: FAMILY MEDICINE CLINIC | Age: 40
End: 2020-10-20
Payer: MEDICARE

## 2020-10-20 DIAGNOSIS — K08.89 TOOTH PAIN: Primary | ICD-10-CM

## 2020-10-20 PROCEDURE — 99214 OFFICE O/P EST MOD 30 MIN: CPT | Performed by: STUDENT IN AN ORGANIZED HEALTH CARE EDUCATION/TRAINING PROGRAM

## 2020-10-20 PROCEDURE — G8756 NO BP MEASURE DOC: HCPCS | Performed by: STUDENT IN AN ORGANIZED HEALTH CARE EDUCATION/TRAINING PROGRAM

## 2020-10-20 PROCEDURE — G8427 DOCREV CUR MEDS BY ELIG CLIN: HCPCS | Performed by: STUDENT IN AN ORGANIZED HEALTH CARE EDUCATION/TRAINING PROGRAM

## 2020-10-20 PROCEDURE — G9717 DOC PT DX DEP/BP F/U NT REQ: HCPCS | Performed by: STUDENT IN AN ORGANIZED HEALTH CARE EDUCATION/TRAINING PROGRAM

## 2020-10-20 RX ORDER — CLINDAMYCIN HYDROCHLORIDE 300 MG/1
300 CAPSULE ORAL 3 TIMES DAILY
Qty: 21 CAP | Refills: 0 | Status: SHIPPED | OUTPATIENT
Start: 2020-10-20 | End: 2020-10-27

## 2020-10-20 RX ORDER — MELOXICAM 15 MG/1
15 TABLET ORAL DAILY
Qty: 10 TAB | Refills: 0 | Status: SHIPPED | OUTPATIENT
Start: 2020-10-20 | End: 2020-11-23

## 2020-10-20 NOTE — PROGRESS NOTES
Cony Haywood  45 y.o. female  1980  [address]  [id number]   [address]:    Telemedicine Progress Note  [facility] [place]       Encounter Date and Time: October 20, 2020 at 9:41 AM    Consent:  She and/or the health care decision maker is aware that that she may receive a bill for this telephone service, depending on her insurance coverage, and has provided verbal consent to proceed: Yes    Chief Complaint   Patient presents with    Dental Pain     History of Present Illness   Cony Haywood is a 45 y.o. female was evaluated by synchronous (real-time) audio-video technology from home, through the [id number] [facility] Patient Portal.    46 y/o female presents with left sided tooth and jaw pain and swelling with B/L ear pain that has been going on for four days. She thinks she may have an abscess. Has dentist appointment tomorrow. No ear drainage, no fatigue or body aches, no fevers or chills. Review of Systems   Review of Systems   Constitutional: Negative for chills, fever, malaise/fatigue and weight loss. HENT: Positive for ear pain. Negative for congestion, ear discharge, hearing loss, sinus pain and sore throat. Eyes: Negative for blurred vision, double vision and pain. Respiratory: Negative for cough, sputum production, shortness of breath and wheezing. Cardiovascular: Negative for chest pain, palpitations and leg swelling. Gastrointestinal: Negative for abdominal pain, heartburn, nausea and vomiting. Genitourinary: Negative for dysuria and urgency. Musculoskeletal: Negative for back pain, myalgias and neck pain. Skin: Negative for rash. Neurological: Negative for dizziness, weakness and headaches. Endo/Heme/Allergies: Does not bruise/bleed easily. Psychiatric/Behavioral: Negative for depression and suicidal ideas.        Vitals/Objective:     General: alert, cooperative, no distress   Mental  status: mental status: alert, oriented to person, place, and time, normal mood, behavior, speech, dress, motor activity, and thought processes   Resp: resp: normal effort and no respiratory distress   Neuro: neuro: no gross deficits   Skin: skin: no discoloration or lesions of concern on visible areas   Due to this being a TeleHealth evaluation, many elements of the physical examination are unable to be assessed. Assessment and Plan:   Time-based coding, delete if not needed: I spent at least 15 minutes with this established patient, and >50% of the time was spent counseling and/or coordinating care regarding tooth pain    1. Tooth pain: Hx of of dental abscesses-- last tried Amoxicillin a few months ago and had allergic rxn (hives). Will prescribe Clinda and have pt follow up with dentist tomorrow. - clindamycin (CLEOCIN) 300 mg capsule; Take 1 Cap by mouth three (3) times daily for 7 days. Dispense: 21 Cap; Refill: 0  - meloxicam (MOBIC) 15 mg tablet; Take 1 Tab by mouth daily. Dispense: 10 Tab; Refill: 0        Time spent in direct conversation with the patient to include medical condition(s) discussed, assessment and treatment plan:       We discussed the expected course, resolution and complications of the diagnosis(es) in detail. Medication risks, benefits, costs, interactions, and alternatives were discussed as indicated. I advised her to contact the office if her condition worsens, changes or fails to improve as anticipated. She expressed understanding with the diagnosis(es) and plan. Patient understands that this encounter was a temporary measure, and the importance of further follow up and examination was emphasized. Patient verbalized understanding. Patient informed to follow up: prn. Electronically Signed: Uvaldo Mercado DO    Providers location when delivering service (clinic, hospital, home): clinic    CPT Codes 60726-98545 for Established Patients may apply to this Telehealth Visit. POS code: 18.   Modifier GT      Pursuant to the emergency declaration under the [facility] waiver authority and the [employer] Act, this Virtual  Visit was conducted, with patient's consent, to reduce the patient's risk of exposure to COVID-19 and provide continuity of care for an established patient. Services were provided through a video synchronous discussion virtually to substitute for in-person clinic visit. History   Patients past medical, surgical and family histories were reviewed and updated.       Past Medical History:   Diagnosis Date    Depression     GERD (gastroesophageal reflux disease)     Headache     Hypertension     Neuropathic pain of forearm, left     Neuropathic pain of left lower extremity     Stroke [facility]) 11/2017 2017     Past Surgical History:   Procedure Laterality Date    CARDIAC SURG PROCEDURE UNLIST  2018    implantable loop recorder for A fib    HX DILATION AND CURETTAGE      D & C     Family History   Problem Relation Age of Onset    Schizophrenia Mother     Depression Mother     Anxiety Mother     Asthma Mother     Crohn's Disease Mother     Stroke Father     Hypertension Father     No Known Problems Sister     No Known Problems Brother     Other Son         pyloric stenosis    Cancer Maternal Grandfather         Lung    Other Paternal Grandfather         Aneursym    Colon Cancer Maternal Uncle     Cancer Maternal Uncle         Brain cancer     Social History     Socioeconomic History    Marital status: SINGLE     Spouse name: Not on file    Number of children: Not on file    Years of education: Not on file    Highest education level: Not on file   Occupational History    Not on file   Social Needs    Financial resource strain: Not on file    Food insecurity     Worry: Not on file     Inability: Not on file    Transportation needs     Medical: Not on file     Non-medical: Not on file   Tobacco Use    Smoking status: Former Smoker     Packs/day: 1.80     Years: 10.00     Pack years: 18.00     Types: Cigarettes    Smokeless tobacco: Never Used   Substance and Sexual Activity    Alcohol use: No    Drug use: Yes     Types: Marijuana    Sexual activity: Yes     Partners: Male     Birth control/protection: Surgical   Lifestyle    Physical activity     Days per week: Not on file     Minutes per session: Not on file    Stress: Not on file   Relationships    Social connections     Talks on phone: Not on file     Gets together: Not on file     Attends Baptism service: Not on file     Active member of club or organization: Not on file     Attends meetings of clubs or organizations: Not on file     Relationship status: Not on file    Intimate partner violence     Fear of current or ex partner: Not on file     Emotionally abused: Not on file     Physically abused: Not on file     Forced sexual activity: Not on file   Other Topics Concern    Not on file   Social History Narrative    Not on file     Patient Active Problem List   Diagnosis Code    Depression F32.9    GERD (gastroesophageal reflux disease) K21.9    Hypertension I10    Brain damage G93.9    Impairment of balance R26.89    Left hemiparesis (HCC) G81.94          Current Medications/Allergies   Medications and Allergies reviewed:    Current Outpatient Medications   Medication Sig Dispense Refill    clindamycin (CLEOCIN) 300 mg capsule Take 1 Cap by mouth three (3) times daily for 7 days. 21 Cap 0    meloxicam (MOBIC) 15 mg tablet Take 1 Tab by mouth daily. 10 Tab 0    lisinopriL (PRINIVIL, ZESTRIL) 20 mg tablet Take 1 Tab by mouth daily. 30 Tab 3    hydroCHLOROthiazide (HYDRODIURIL) 25 mg tablet TAKE 1 TABLET BY MOUTH DAILY 30 Tab 5    famotidine (PEPCID) 40 mg tablet Take 1 Tab by mouth daily.  30 Tab 5    cetirizine (ZYRTEC) 1 mg/mL solution TAKE 10 ML BY MOUTH NIGHTLY 300 mL 5    aspirin 81 mg chewable tablet CHEW AND SWALLOW ONE TABLET BY MOUTH EVERY DAY 90 Tab 1    atenoloL (TENORMIN) 50 mg tablet TAKE 1 TABLET BY MOUTH DAILY 30 Tab 5    atorvastatin (LIPITOR) 40 mg tablet TAKE ONE TABLET BY MOUTH EVERY DAY 30 Tab 5    PRILOSEC OTC 20 mg tablet TAKE ONE TABLET BY MOUTH EVERY DAY 28 Tab 5    triamcinolone acetonide (KENALOG) 0.1 % topical cream   3    ammonium lactate (AMLACTIN) 12 % topical cream   3    fluticasone (FLONASE) 50 mcg/actuation nasal spray 2 Sprays by Both Nostrils route daily.  1 Bottle 3     Allergies   Allergen Reactions    Amoxicillin Hives     On arms     Carvedilol Hives    Tramadol Nausea Only     Headaches  Patient states not allergic

## 2020-10-22 NOTE — PROGRESS NOTES
[address] Medicine Residency Attending Addendum:  Dr. Jamil Lofton, DO,  the patient and I were not physically present during this encounter. The resident and I are concurrently monitoring the patient care through appropriate telecommunication technology. I discussed the findings, assessment and plan with the resident and agree with the resident's findings and plan as documented in the resident's note.       Maggi Henley MD

## 2020-10-29 ENCOUNTER — TELEPHONE (OUTPATIENT)
Dept: FAMILY MEDICINE CLINIC | Age: 40
End: 2020-10-29

## 2020-10-29 NOTE — TELEPHONE ENCOUNTER
----- Message from Ngozi Vargas sent at 10/29/2020  7:51 AM EDT -----  Regarding: FW: Dr Flores/telephone    ----- Message -----  From: Yahir Pickard  Sent: 10/29/2020   7:15 AM EDT  To: [facility] Front Office Pool  Subject: Dr Tia Orozco is still waiting on a call back from yesterday, regarding her BP being high, at her Neurology appt and the doctor was trying yesterday to get the pt seen, please call pt at [phone], need a call back this morning.

## 2020-10-29 NOTE — TELEPHONE ENCOUNTER
Attempted to call. Unsuccessful.  Message left  Pt will need to schedule an appointment to discuss blood pressure issues

## 2020-10-30 NOTE — TELEPHONE ENCOUNTER
High BP yesterday when she went to the Neurologist 199/101. Dr. Marty Balderas was going to E-mail Dr. Bhanu Gallagher about the visit. She has an appointment on November 5th. Pt is in pain a lot because of her health.

## 2020-11-05 ENCOUNTER — VIRTUAL VISIT (OUTPATIENT)
Dept: FAMILY MEDICINE CLINIC | Age: 40
End: 2020-11-05

## 2020-11-23 ENCOUNTER — OFFICE VISIT (OUTPATIENT)
Dept: FAMILY MEDICINE CLINIC | Age: 40
End: 2020-11-23
Payer: MEDICARE

## 2020-11-23 ENCOUNTER — TELEPHONE (OUTPATIENT)
Dept: FAMILY MEDICINE CLINIC | Age: 40
End: 2020-11-23

## 2020-11-23 VITALS
HEART RATE: 80 BPM | BODY MASS INDEX: 26.49 KG/M2 | WEIGHT: 159 LBS | RESPIRATION RATE: 18 BRPM | DIASTOLIC BLOOD PRESSURE: 78 MMHG | SYSTOLIC BLOOD PRESSURE: 141 MMHG | OXYGEN SATURATION: 97 % | HEIGHT: 65 IN | TEMPERATURE: 97.5 F

## 2020-11-23 DIAGNOSIS — H65.192 OTHER NON-RECURRENT ACUTE NONSUPPURATIVE OTITIS MEDIA OF LEFT EAR: Primary | ICD-10-CM

## 2020-11-23 PROCEDURE — G8754 DIAS BP LESS 90: HCPCS | Performed by: FAMILY MEDICINE

## 2020-11-23 PROCEDURE — G8753 SYS BP > OR = 140: HCPCS | Performed by: FAMILY MEDICINE

## 2020-11-23 PROCEDURE — 99214 OFFICE O/P EST MOD 30 MIN: CPT | Performed by: FAMILY MEDICINE

## 2020-11-23 PROCEDURE — G8419 CALC BMI OUT NRM PARAM NOF/U: HCPCS | Performed by: FAMILY MEDICINE

## 2020-11-23 PROCEDURE — G8427 DOCREV CUR MEDS BY ELIG CLIN: HCPCS | Performed by: FAMILY MEDICINE

## 2020-11-23 PROCEDURE — G9717 DOC PT DX DEP/BP F/U NT REQ: HCPCS | Performed by: FAMILY MEDICINE

## 2020-11-23 RX ORDER — CEFDINIR 300 MG/1
300 CAPSULE ORAL 2 TIMES DAILY
Qty: 20 CAP | Refills: 0 | Status: SHIPPED | OUTPATIENT
Start: 2020-11-23 | End: 2020-12-03

## 2020-11-23 RX ORDER — ZINC GLUCONATE 13.3 MG
200 LOZENGE ORAL 2 TIMES DAILY
Qty: 60 TAB | Refills: 0
Start: 2020-11-23 | End: 2021-04-26

## 2020-11-23 RX ORDER — IBUPROFEN 400 MG/1
400 TABLET ORAL
Qty: 60 TAB | Refills: 1 | Status: SHIPPED | OUTPATIENT
Start: 2020-11-23 | End: 2020-12-24 | Stop reason: ALTCHOICE

## 2020-11-23 NOTE — PROGRESS NOTES
[facility]    History of Present Illness:   Fanta Iyerr is a [address] y.o. female with history of HTN, Left Hemiparesis, Depression  CC: Left ear pain  History provided by patient and Records    HPI:  Ear pain:  Patient presents with complain of pain of the left ear(s). Symptoms ongoing over the last 3 days. she reports drainage from the effected ear. Patient denies current URI/Sinusitis symptoms. Current OTC medications include None, which have been not very effective. - Associated symptoms: Reports ear pain and left neck pain. Denies fever, cough, vomiting.  - Otitis media History negative. - History of Tympanostomy tubes? No          Health Maintenance  Health Maintenance Due   Topic Date Due    DTaP/Tdap/Td series (1 - Tdap) 02/22/2002    Medicare Yearly Exam  08/17/2020    Flu Vaccine (1) 09/01/2020    PAP AKA CERVICAL CYTOLOGY  03/23/2021       Past Medical, Family, and Social History:     Current Outpatient Medications on File Prior to Visit   Medication Sig Dispense Refill    aspirin 81 mg chewable tablet CHEW AND SWALLOW ONE TABLET BY MOUTH EVERY DAY 30 Tab 5    lisinopriL (PRINIVIL, ZESTRIL) 10 mg tablet TAKE 1 TABLET BY MOUTH NIGHTLY 30 Tab 5    atorvastatin (LIPITOR) 40 mg tablet TAKE ONE TABLET BY MOUTH EVERY DAY 30 Tab 5    atenoloL (TENORMIN) 50 mg tablet TAKE 1 TABLET BY MOUTH DAILY 30 Tab 5    PriLOSEC OTC 20 mg tablet TAKE ONE TABLET BY MOUTH EVERY DAY 28 Tab 5    lisinopriL (PRINIVIL, ZESTRIL) 20 mg tablet Take 1 Tab by mouth daily. 30 Tab 3    hydroCHLOROthiazide (HYDRODIURIL) 25 mg tablet TAKE 1 TABLET BY MOUTH DAILY 30 Tab 5    cetirizine (ZYRTEC) 1 mg/mL solution TAKE 10 ML BY MOUTH NIGHTLY 300 mL 5    triamcinolone acetonide (KENALOG) 0.1 % topical cream   3    ammonium lactate (AMLACTIN) 12 % topical cream   3    fluticasone (FLONASE) 50 mcg/actuation nasal spray 2 Sprays by Both Nostrils route daily.  1 Bottle 3    [DISCONTINUED] meloxicam (MOBIC) 15 mg tablet Take 1 Tab by mouth daily. 10 Tab 0    [DISCONTINUED] famotidine (PEPCID) 40 mg tablet TAKE 1/2 TABLET BY MOUTH NIGHTLY 90 Tab 1     No current facility-administered medications on file prior to visit.         Patient Active Problem List   Diagnosis Code    Depression F32.9    GERD (gastroesophageal reflux disease) K21.9    Hypertension I10    Brain damage G93.9    Impairment of balance R26.89    Left hemiparesis (HCC) G81.94       Social History     Socioeconomic History    Marital status: SINGLE     Spouse name: Not on file    Number of children: Not on file    Years of education: Not on file    Highest education level: Not on file   Occupational History    Not on file   Social Needs    Financial resource strain: Not on file    Food insecurity     Worry: Not on file     Inability: Not on file    Transportation needs     Medical: Not on file     Non-medical: Not on file   Tobacco Use    Smoking status: Former Smoker     Packs/day: 1.80     Years: 10.00     Pack years: 18.00     Types: Cigarettes    Smokeless tobacco: Never Used   Substance and Sexual Activity    Alcohol use: No    Drug use: Yes     Types: Marijuana    Sexual activity: Yes     Partners: Male     Birth control/protection: Surgical   Lifestyle    Physical activity     Days per week: Not on file     Minutes per session: Not on file    Stress: Not on file   Relationships    Social connections     Talks on phone: Not on file     Gets together: Not on file     Attends Rastafari service: Not on file     Active member of club or organization: Not on file     Attends meetings of clubs or organizations: Not on file     Relationship status: Not on file    Intimate partner violence     Fear of current or ex partner: Not on file     Emotionally abused: Not on file     Physically abused: Not on file     Forced sexual activity: Not on file   Other Topics Concern    Not on file   Social History Narrative    Not on file Review of Systems   Review of Systems   Constitutional: Negative for chills and fever. HENT: Positive for ear pain. Negative for ear discharge, hearing loss and tinnitus. Respiratory: Negative for cough. Cardiovascular: Negative for chest pain. Musculoskeletal: Positive for neck pain. Objective:     Visit Vitals  BP (!) 141/78 (BP 1 Location: Right arm, BP Patient Position: Sitting)   Pulse 80   Temp 97.5 °F (36.4 °C) (Temporal)   Resp 18   Ht 5' 5\" (1.651 m)   Wt 159 lb (72.1 kg)   SpO2 97%   BMI 26.46 kg/m²        Physical Exam  Vitals signs and nursing note reviewed. Constitutional:       Appearance: Normal appearance. HENT:      Head: Normocephalic and atraumatic. Right Ear: Hearing, tympanic membrane and ear canal normal.      Left Ear: Hearing and ear canal normal.  No middle ear effusion. Tympanic membrane is injected. Mouth/Throat:      Mouth: Mucous membranes are moist.      Pharynx: Oropharynx is clear. Neurological:      Mental Status: She is alert. Pertinent Labs/Studies:      Assessment and orders:       ICD-10-CM ICD-9-CM    1. Other non-recurrent acute nonsuppurative otitis media of left ear  H65.192 381.00 cefdinir (OMNICEF) 300 mg capsule      ibuprofen (MOTRIN) 400 mg tablet     Diagnoses and all orders for this visit:    1. Other non-recurrent acute nonsuppurative otitis media of left ear Starting empiric antibiotics for otitis media. Discussed use of OTC pain relievers. May take between 24-48 hours for symptoms to improve significantly, and advised any changes in hearing may take several days to improve due to effusion present. Patient advised on importance of completing entirety of prescribed antibiotics. -     cefdinir (OMNICEF) 300 mg capsule; Take 1 Cap by mouth two (2) times a day for 10 days. -     ibuprofen (MOTRIN) 400 mg tablet; Take 1 Tab by mouth every eight (8) hours as needed for Pain.     Other orders  -     cimetidine (TAGAMET) 200 mg tab; Take 1 Tab by mouth two (2) times a day. Follow-up and Dispositions    · Return if symptoms worsen or fail to improve. I have discussed the diagnosis with the patient and the intended plan as seen in the above orders. Social history, medical history, and labs were reviewed. The patient has received an after-visit summary and questions were answered concerning future plans. I have discussed medication side effects and warnings with the patient as well.     MD JAC Figueroa & JOSELITO CENTENO [facility]  11/23/20

## 2020-11-23 NOTE — TELEPHONE ENCOUNTER
----- Message from Valerie Rubio sent at 11/23/2020  7:25 AM EST -----  Regarding: Flores/telephone  Pt is requesting for you to call her in regard to pain in her left ear and neck pain. She did not want to schedule a vv appointment at this time. Pts number is [phone].

## 2020-11-23 NOTE — PROGRESS NOTES
Chief Complaint   Patient presents with    Ear Pain     Visit Vitals  BP (!) 141/78 (BP 1 Location: Right arm, BP Patient Position: Sitting)   Pulse 80   Temp 97.5 °F (36.4 °C) (Temporal)   Resp 18   Ht 5' 5\" (1.651 m)   Wt 159 lb (72.1 kg)   SpO2 97%   BMI 26.46 kg/m²     1. Have you been to the ER, urgent care clinic since your last visit? Hospitalized since your last visit? No    2. Have you seen or consulted any other health care providers outside of the [address] [place] [address] since your last visit? Include any pap smears or colon screening.  No    Reviewed record in preparation for visit and have necessary documentation  Pt did not bring medication to office visit for review  opportunity was given for questions  Goals that were addressed and/or need to be completed during or after this appointment include   Health Maintenance Due   Topic Date Due    DTaP/Tdap/Td series (1 - Tdap) 02/22/2002    Medicare Yearly Exam  08/17/2020    Flu Vaccine (1) 09/01/2020    PAP AKA CERVICAL CYTOLOGY  03/23/2021

## 2020-12-24 ENCOUNTER — VIRTUAL VISIT (OUTPATIENT)
Dept: FAMILY MEDICINE CLINIC | Age: 40
End: 2020-12-24
Payer: MEDICARE

## 2020-12-24 DIAGNOSIS — M26.609 TMJ (TEMPOROMANDIBULAR JOINT SYNDROME): Primary | ICD-10-CM

## 2020-12-24 PROCEDURE — 99442 PR PHYS/QHP TELEPHONE EVALUATION 11-20 MIN: CPT | Performed by: FAMILY MEDICINE

## 2020-12-24 RX ORDER — NAPROXEN 500 MG/1
500 TABLET ORAL
Qty: 60 TAB | Refills: 1 | Status: SHIPPED | OUTPATIENT
Start: 2020-12-24 | End: 2021-04-26 | Stop reason: SDUPTHER

## 2020-12-24 RX ORDER — DULOXETIN HYDROCHLORIDE 30 MG/1
CAPSULE, DELAYED RELEASE ORAL
COMMUNITY
Start: 2020-10-28 | End: 2021-04-26

## 2020-12-24 RX ORDER — CYCLOBENZAPRINE HCL 10 MG
TABLET ORAL
COMMUNITY
Start: 2020-11-27 | End: 2020-12-24 | Stop reason: SDUPTHER

## 2020-12-24 RX ORDER — NAPROXEN 500 MG/1
TABLET ORAL
COMMUNITY
Start: 2020-11-27 | End: 2020-12-24 | Stop reason: SDUPTHER

## 2020-12-24 RX ORDER — CYCLOBENZAPRINE HCL 10 MG
10 TABLET ORAL
Qty: 30 TAB | Refills: 1 | Status: SHIPPED | OUTPATIENT
Start: 2020-12-24 | End: 2021-01-28

## 2020-12-24 NOTE — PROGRESS NOTES
1. Have you been to the ER, urgent care clinic since your last visit? Hospitalized since your last visit? Yes When: [facility] er    2. Have you seen or consulted any other health care providers outside of the [address] [place] [address] since your last visit? Include any pap smears or colon screening.  No        Health Maintenance Due   Topic Date Due    DTaP/Tdap/Td series (1 - Tdap) 02/22/2002    Medicare Yearly Exam  08/17/2020    Flu Vaccine (1) 09/01/2020    PAP AKA CERVICAL CYTOLOGY  03/23/2021

## 2020-12-31 NOTE — PROGRESS NOTES
Vanessa Garcia is a 45 y.o. female evaluated via telephone on 20. Patient Identity confirmed by . Telephone encounter done in lieu of office visit due to extraordinary circumstances. A state of national and state emergency has been declared by the President and the [place] due to the [employer] pandemic. Pursuant to the emergency declaration under the [facility] [address] authority and the [employer] Act, this Virtual  Visit was conducted, with patient's consent, to reduce the patient's risk of exposure to COVID-19 and provide continuity of care for an established patient. Cristofer Briceno LPN coordinated virtual visit    Consent:  Patient and/or health care decision maker is aware that he/she may receive a bill for this telephone encounter, depending on his insurance coverage, and has provided verbal consent to proceed: Yes    Physician Location: Office  Patient Location: Home    CC: TMJ  Information gathered from patient and/or health care decision maker. HPI: Vanessa Garcia is a 45 y.o. female who was evaluated by synchronous (real-time) audio technology from his/her home. Patient seen in [place] [address] Ed for right facial swelling and pain. She had been recently been treated for AOM. TMJ diagnosed in ED. Follow up with dentist recommended. Naproxen and cyclobenzaprine helpful. Encounter Diagnoses   Name Primary?  TMJ (temporomandibular joint syndrome) Yes         Current Outpatient Medications:     cyclobenzaprine (FLEXERIL) 10 mg tablet, Take 1 Tab by mouth three (3) times daily as needed for Muscle Spasm(s). , Disp: 30 Tab, Rfl: 1    naproxen (NAPROSYN) 500 mg tablet, Take 1 Tab by mouth two (2) times daily as needed for Pain., Disp: 60 Tab, Rfl: 1    cimetidine (TAGAMET) 200 mg tab, Take 1 Tab by mouth two (2) times a day., Disp: 60 Tab, Rfl: 0    aspirin 81 mg chewable tablet, CHEW AND SWALLOW ONE TABLET BY MOUTH EVERY DAY, Disp: 30 Tab, Rfl: 5    atorvastatin (LIPITOR) 40 mg tablet, TAKE ONE TABLET BY MOUTH EVERY DAY, Disp: 30 Tab, Rfl: 5    atenoloL (TENORMIN) 50 mg tablet, TAKE 1 TABLET BY MOUTH DAILY, Disp: 30 Tab, Rfl: 5    PriLOSEC OTC 20 mg tablet, TAKE ONE TABLET BY MOUTH EVERY DAY, Disp: 28 Tab, Rfl: 5    lisinopriL (PRINIVIL, ZESTRIL) 20 mg tablet, Take 1 Tab by mouth daily. (Patient taking differently: Take 20 mg by mouth nightly.), Disp: 30 Tab, Rfl: 3    hydroCHLOROthiazide (HYDRODIURIL) 25 mg tablet, TAKE 1 TABLET BY MOUTH DAILY, Disp: 30 Tab, Rfl: 5    cetirizine (ZYRTEC) 1 mg/mL solution, TAKE 10 ML BY MOUTH NIGHTLY, Disp: 300 mL, Rfl: 5    triamcinolone acetonide (KENALOG) 0.1 % topical cream, , Disp: , Rfl: 3    ammonium lactate (AMLACTIN) 12 % topical cream, , Disp: , Rfl: 3    fluticasone (FLONASE) 50 mcg/actuation nasal spray, 2 Sprays by Both Nostrils route daily. , Disp: 1 Bottle, Rfl: 3    DULoxetine (CYMBALTA) 30 mg capsule, Patient states she never started medication, Disp: , Rfl:     lisinopriL (PRINIVIL, ZESTRIL) 10 mg tablet, TAKE 1 TABLET BY MOUTH NIGHTLY, Disp: 30 Tab, Rfl: 5     Allergies   Allergen Reactions    Amoxicillin Hives     On arms     Carvedilol Hives    Tramadol Nausea Only     Headaches  Patient states not allergic        Patient Active Problem List    Diagnosis Date Noted    Impairment of balance 02/19/2019    Left hemiparesis ([facility] [address] 02/19/2019    Brain damage 05/24/2018    Depression     GERD (gastroesophageal reflux disease)     Hypertension         Review of Systems:  Constitutional: Negative for fatigue, malaise  Resp: Negative for cough, wheezing or SOB  CV: Negative for chest pain, dizziness or palpitations  GI: Negative for nausea or abdominal pain  MS: Negative for acute myalgias or arthralgias   Neuro: Negative for HA, weakness or paresthesia  Psych: Negative for depression or anxiety       Assessment/Plan:  Details of this discussion including any medical advice provided: Patient advised as a precaution to stay at home, practice regular hand washing with soap and warm water and to wear a mask and utilize social distancing when necessary to be out in public places. ICD-10-CM ICD-9-CM    1. TMJ (temporomandibular joint syndrome)  M26.609 524.60 cyclobenzaprine (FLEXERIL) 10 mg tablet      naproxen (NAPROSYN) 500 mg tablet       Symptomatic therapy suggested: call prn if symptoms persist or worsen. Total Time: minutes: 5-10 minutes was spent addressing above problems and plan. Patient medical history, prior OV notes, vitals flow sheet, lab results, medications, and allergies were reviewed during this encounter. All of the patient's questions were addressed and answered to apparent satisfaction. The patient understands and agrees with our plan of care. The patient knows to call back if they have questions about the plan of care or if symptoms change. For phone encounters:  I affirm this is a patient initiated episode with an established Patient who has not had a related appointment within my department in the past 7 days or scheduled within the next 24 hours.     Note: not billable if this call serves to triage the patient into an appointment for the relevant concern        MD JAC Don & JOSELITO CENTENO [facility]  12/31/20

## 2021-03-31 DIAGNOSIS — I10 ESSENTIAL HYPERTENSION: ICD-10-CM

## 2021-03-31 RX ORDER — LISINOPRIL 20 MG/1
TABLET ORAL
Qty: 30 TAB | Refills: 3 | Status: SHIPPED | OUTPATIENT
Start: 2021-03-31 | End: 2021-04-26 | Stop reason: DRUGHIGH

## 2021-03-31 NOTE — TELEPHONE ENCOUNTER
Can we get her an appt to f/u chronic conditions? Should be in person and needs to be a 40 minute spot. Thanks!

## 2021-04-01 NOTE — TELEPHONE ENCOUNTER
Attempted to call. No answer. Message left. Patient needs routine appt with Dr. Claire Ibarra. 40 minute appt please.

## 2021-04-01 NOTE — TELEPHONE ENCOUNTER
Attempted to call pt, no answer, mess left. Per Dr. Ghassan Padilla, pt needs a 40 min routine appt.  Please schedule

## 2021-04-26 ENCOUNTER — OFFICE VISIT (OUTPATIENT)
Dept: FAMILY MEDICINE CLINIC | Age: 41
End: 2021-04-26
Payer: MEDICARE

## 2021-04-26 VITALS
RESPIRATION RATE: 20 BRPM | WEIGHT: 184.6 LBS | DIASTOLIC BLOOD PRESSURE: 88 MMHG | HEIGHT: 65 IN | BODY MASS INDEX: 30.75 KG/M2 | OXYGEN SATURATION: 96 % | TEMPERATURE: 98.6 F | HEART RATE: 63 BPM | SYSTOLIC BLOOD PRESSURE: 145 MMHG

## 2021-04-26 DIAGNOSIS — F33.1 MODERATE EPISODE OF RECURRENT MAJOR DEPRESSIVE DISORDER (HCC): ICD-10-CM

## 2021-04-26 DIAGNOSIS — M26.609 TMJ (TEMPOROMANDIBULAR JOINT SYNDROME): ICD-10-CM

## 2021-04-26 DIAGNOSIS — I10 ESSENTIAL HYPERTENSION: ICD-10-CM

## 2021-04-26 DIAGNOSIS — M25.562 ACUTE PAIN OF LEFT KNEE: Primary | ICD-10-CM

## 2021-04-26 PROCEDURE — G8754 DIAS BP LESS 90: HCPCS | Performed by: FAMILY MEDICINE

## 2021-04-26 PROCEDURE — 99215 OFFICE O/P EST HI 40 MIN: CPT | Performed by: FAMILY MEDICINE

## 2021-04-26 PROCEDURE — G9717 DOC PT DX DEP/BP F/U NT REQ: HCPCS | Performed by: FAMILY MEDICINE

## 2021-04-26 PROCEDURE — G8427 DOCREV CUR MEDS BY ELIG CLIN: HCPCS | Performed by: FAMILY MEDICINE

## 2021-04-26 PROCEDURE — G8417 CALC BMI ABV UP PARAM F/U: HCPCS | Performed by: FAMILY MEDICINE

## 2021-04-26 PROCEDURE — G8753 SYS BP > OR = 140: HCPCS | Performed by: FAMILY MEDICINE

## 2021-04-26 RX ORDER — LISINOPRIL 40 MG/1
40 TABLET ORAL DAILY
Qty: 90 TAB | Refills: 1 | Status: SHIPPED | OUTPATIENT
Start: 2021-04-26 | End: 2021-08-11

## 2021-04-26 RX ORDER — BUPROPION HYDROCHLORIDE 150 MG/1
TABLET ORAL
Qty: 30 TAB | Refills: 0 | Status: SHIPPED | OUTPATIENT
Start: 2021-04-26 | End: 2021-04-26 | Stop reason: SDUPTHER

## 2021-04-26 RX ORDER — DICLOFENAC SODIUM 10 MG/G
GEL TOPICAL
COMMUNITY
Start: 2021-04-19 | End: 2021-10-14 | Stop reason: ALTCHOICE

## 2021-04-26 RX ORDER — NAPROXEN 500 MG/1
500 TABLET ORAL
Qty: 60 TAB | Refills: 1 | Status: SHIPPED | OUTPATIENT
Start: 2021-04-26 | End: 2021-10-14 | Stop reason: SDUPTHER

## 2021-04-26 RX ORDER — BUPROPION HYDROCHLORIDE 150 MG/1
TABLET ORAL
Qty: 30 TAB | Refills: 0 | Status: SHIPPED | OUTPATIENT
Start: 2021-04-26 | End: 2021-10-14

## 2021-04-26 NOTE — PATIENT INSTRUCTIONS
Temporomandibular Disorder: Care Instructions Overview Temporomandibular disorders (TMDs) are problems with the muscles and joints that connect your jaw to your skull. The disorders cause pain when you talk, chew, swallow, or yawn. You may feel this pain on one or both sides. TMDs are often caused by tight jaw muscles. The tightness can be caused by clenching or grinding your teeth. This may happen when you have a lot of stress in your life. If you lower your stress, you may be able to stop clenching or grinding your teeth. This will help relax your jaw and reduce your pain. Your doctor may suggest a dental splint. Splints can help reduce teeth grinding and clenching. You may also be able to do some things at home to feel better. But if none of this works, your doctor may prescribe medicine to help relax your muscles and control the pain. Follow-up care is a key part of your treatment and safety. Be sure to make and go to all appointments, and call your doctor if you are having problems. It's also a good idea to know your test results and keep a list of the medicines you take. How can you care for yourself at home? · Put either an ice pack or a warm, moist cloth on your jaw for 15 minutes several times a day. You can try switching back and forth between moist heat and cold. · Make eating easy on your jaw. Choose softer foods that are easy to chew like eggs, yogurt, or soup. Avoid hard foods that cause your jaws to work very hard. Try cutting your food into small pieces. And if your jaw gets too painful to chew, or if it locks, you may need to puree your food for a while. · To relax your jaw, repeat this exercise for a few minutes every morning and evening. Watch yourself in a mirror. Gently open and close your mouth. Move your jaw straight up and down. But don't do this if it makes your pain worse. · Manage stress. You may be clenching or tightening your muscles when you are under stress.  
· Get at least 30 minutes of exercise on most days of the week to relieve stress. Walking is a good choice. · Ask your doctor if you can take an over-the-counter pain medicine, such as acetaminophen (Tylenol), ibuprofen (Advil, Motrin), or naproxen (Aleve). Be safe with medicines. Read and follow all instructions on the label. · Use good posture for sitting and standing. Slumping your shoulders disturbs the alignment of your facial bones and muscles. · Don't: 
? Hold a phone between your shoulder and your jaw. ? Open your mouth all the way, like when you sing loudly or yawn. ? Clench or grind your teeth, bite your lips, or chew your fingernails. ? Clench things such as pens, pipes, or cigars between your teeth. When should you call for help? Call your doctor now or seek immediate medical care if: 
  · Your jaw is locked open or shut or it is hard to move your jaw. Watch closely for changes in your health, and be sure to contact your doctor if: 
  · Your jaw pain gets worse.  
  · Your face is swollen.  
  · You do not get better as expected. Where can you learn more? Go to http://www.gray.com/ Enter E783 in the search box to learn more about \"Temporomandibular Disorder: Care Instructions. \" Current as of: October 27, 2020               Content Version: 12.8 © 7113-3458 [employer]. Care instructions adapted under license by [employer] (which disclaims liability or warranty for this information). If you have questions about a medical condition or this instruction, always ask your healthcare professional. [place] [address] any warranty or liability for your use of this information. Leah [address] Affiliated with [address] [place] [address]., [place], [address] [place] [address] 
[phone] Monitor blood pressure outside the office several times weekly at different times during the day and evening. Bring the record to me in 3 weeks for review. Blood Pressure Record Patient Name:  ______________________ :  ______________________ Date/Time BP Reading Pulse Home Blood Pressure Test: About This Test 
What is it? A home blood pressure test allows you to keep track of your blood pressure at home. Blood pressure is a measure of the force of blood against the walls of your arteries. Blood pressure readings include two numbers, such as 130/80 (say \"130 over 80\"). The first number is the systolic pressure. The second number is the diastolic pressure. Why is this test done? You may do this test at home to: · Find out if you have high blood pressure. · Track your blood pressure if you have high blood pressure. · Track how well medicine is working to reduce high blood pressure. · Check how lifestyle changes, such as weight loss and exercise, are affecting blood pressure. How do you prepare for the test? 
For at least 30 minutes before you take your blood pressure, don't exercise, drink caffeine, or smoke. Empty your bladder before the test. Sit quietly with your back straight and both feet on the floor for at least 5 minutes. This helps you take your blood pressure while you feel comfortable and relaxed. How is the test done? · If your doctor recommends it, take your blood pressure twice a day. Take it in the morning and evening. · Sit with your arm slightly bent and resting on a table so that your upper arm is at the same level as your heart. · Use the same arm each time you take your blood pressure. · Place the blood pressure cuff on the bare skin of your upper arm. You may have to roll up your sleeve, remove your arm from the sleeve, or take your shirt off.  
· Wrap the blood pressure cuff around your upper arm so that the lower edge of the cuff is about 1 inch above the bend of your elbow. · Do not move, talk, or text while you take your blood pressure. Follow the instructions that came with your blood pressure monitor. They might be different from the following. · Press the on/off button on the automatic monitor. Then you may need to wait until the screen says the monitor is ready. · Press the start button. The cuff will inflate and deflate by itself. · Your blood pressure numbers will appear on the screen. · Wait one minute and take your blood pressure again. · If your monitor does not automatically save your numbers, write them in your log book, along with the date and time. Follow-up care is a key part of your treatment and safety. Be sure to make and go to all appointments, and call your doctor if you are having problems. It's also a good idea to keep a list of the medicines you take. Where can you learn more? Go to http://www.dutton.com/ Enter C427 in the search box to learn more about \"Home Blood Pressure Test: About This Test.\" Current as of: August 31, 2020               Content Version: 12.8 © 5230-7151 [employer]. Care instructions adapted under license by [employer] (which disclaims liability or warranty for this information). If you have questions about a medical condition or this instruction, always ask your healthcare professional. [place] [address] any warranty or liability for your use of this information.

## 2021-04-26 NOTE — PROGRESS NOTES
CC: Follow-up    HPI: Pt is a 37 y.o. female who presents for follow-up. She is a poor historian 2/2 brain injury from prior stroke and difficult to follow. She reports that her depression is not well-controlled. She had been prescribed Cymbalta in the past but is no longer taking that. She has also gained some weight. She recently found out that she can get medical equipment from [facility] and was able to get a cane and BP cuff through them. Her left knee has been hurting her. She had been having pain in her R leg and was relying more on the L side during this time, and she thinks that may have started it. 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 She has not had any recent injuries. Her stroke affected the L side so she has chronic weakness and decreased sensation in the L leg, but does not think it has worsened. She does report that sometimes it feels like the knee wants to buckle. The pain on the R is much improved with diclofenac gel prn. She has not tried using the diclofenac or her naproxen on the L side. She reports being diagnosed with TMD in the ER several months ago. She was given flexeril and naproxen at the time which helped tremendously but it flares up from time to time.      HTN:  Checking BPs at home?: NO, just got a cuff  Headaches?: YES - on and off at baseline  Blurry vision?: NO  Lower extremity edema?: YES, baseline, worse in R leg  Smoking?: NO      Past Medical History:   Diagnosis Date    Depression     GERD (gastroesophageal reflux disease)     Headache     Hypertension     Neuropathic pain of forearm, left     Neuropathic pain of left lower extremity     [facility] 11/2017 2017       Family History   Problem Relation Age of Onset    Schizophrenia Mother     Depression Mother     Anxiety Mother     Asthma Mother     Crohn's Disease Mother     Stroke Father     Hypertension Father     No Known Problems Sister    [place] No Known Problems Brother     Other Son         pyloric stenosis    Cancer Maternal Grandfather         Lung    Other Paternal Grandfather         Aneursym    Colon Cancer Maternal Uncle     Cancer Maternal Uncle         Brain cancer       Social History     Tobacco Use    Smoking status: Former Smoker     Packs/day: 1.80     Years: 10.00     Pack years: 18.00     Types: Cigarettes    Smokeless tobacco: Never Used   Substance Use Topics    Alcohol use: No    Drug use: Yes     Types: Marijuana       ROS:  Per HPI    PE:  Visit Vitals  BP (!) 145/88   Pulse 63   Temp 98.6 °F (37 °C) (Oral)   Resp 20   Ht 5' 5\" (1.651 m)   Wt 184 lb 9.6 oz (83.7 kg)   SpO2 96%   BMI 30.72 kg/m²     Gen: Pt sitting in chair, in NAD  Head: Normocephalic, atraumatic  Eyes: Sclera anicteric, EOM grossly intact. Pupillary exam deferred 2/2 pt request because the light bothered her eyes. Throat: Mask in place  Neck: Supple  CVS: Normal S1, S2, no m/r/g  Resp: CTAB, no wheezes or rales  MSK: No effusion or erythema of L knee. TTP along medial joint line and tibial crest, otherwise non-tender. Full ROM. Strength 4/5 with extension and flexion (baseline) and sensation slightly decreased to light touch on L compared to R (baseline)  Extrem: Atraumatic, no cyanosis or edema  Pulses: 2+   Skin: Warm, dry  Neuro: Alert, oriented      A/P:   Encounter Diagnoses     ICD-10-CM ICD-9-CM   1. Acute pain of left knee  M25.562 719.46   2. Essential hypertension  I10 401.9   3. Moderate episode of recurrent major depressive disorder (HCC)  F33.1 296.32   4. TMJ (temporomandibular joint syndrome)  M26.609 524.60     1. Acute pain of left knee: Likely flare of arthritis pain 2/2 overuse while R leg was injured. Advised she can try naproxen and diclofenac gel and will get XR today. She would prefer to hold off on PT if possible because her insurance only covers it for 30 days and she is not sure this short amount of time would be useful.  However if she is not feeling better she will call and we can put in referral.   - XR KNEE LT MAX 2 VWS; Future    2. Essential hypertension: Uncontrolled. Possibly 2/2 recent weight gain. Pt is working on becoming more active to lose weight, and in meantime will increase lisinopril to 40mg daily given desire for tight control in pt with h/o stroke. - LIPID PANEL; Future  - METABOLIC PANEL, BASIC; Future  - METABOLIC PANEL, BASIC  - LIPID PANEL  - lisinopriL (PRINIVIL, ZESTRIL) 40 mg tablet; Take 1 Tab by mouth daily. Dispense: 90 Tab; Refill: 1    3. Moderate episode of recurrent major depressive disorder [facility]): Discussed that Wellbutrin could help for her mood and may also help with some weight loss. She is interested in trying this. - buPROPion XL (WELLBUTRIN XL) 150 mg tablet; Take one tab daily for the fist week then increase to one tab twice a day. Dispense: 30 Tab; Refill: 0    4. TMJ (temporomandibular joint syndrome): Handout given on exercises for TMD and also tips to avoid flare-ups. Will refill naproxen for this as well as for her leg. Pt advised if she gets a flare that does not respond to naproxen alone, she can call and I will send in flexeril too.   - naproxen (NAPROSYN) 500 mg tablet; Take 1 Tab by mouth two (2) times daily as needed for Pain. Dispense: 60 Tab; Refill: 1       RTC in 2 weeks for BP check    A total of 45 minutes was spent on this encounter including 25 minutes obtaining history and exam, 10 minutes discussing treatment options with the patient, and 10 minutes documenting. Discussed diagnoses in detail with patient. Medication risks/benefits/side effects discussed with patient. All of the patient's questions were addressed. The patient understands and agrees with our plan of care. The patient knows to call back if they are unsure of or forget any changes we discussed today or if the symptoms change.   The patient received an After-Visit Summary which contains VS, orders, medication list and allergy list. This can be used as a \"mini-medical record\" should they have to seek medical care while out of town. Current Outpatient Medications on File Prior to Visit   Medication Sig Dispense Refill    cetirizine (ZYRTEC) 1 mg/mL solution TAKE 10 ML BY MOUTH NIGHTLY 300 mL 2    aspirin 81 mg chewable tablet CHEW AND SWALLOW ONE TABLET BY MOUTH EVERY DAY 30 Tab 5    atorvastatin (LIPITOR) 40 mg tablet TAKE ONE TABLET BY MOUTH EVERY DAY 30 Tab 5    atenoloL (TENORMIN) 50 mg tablet TAKE 1 TABLET BY MOUTH DAILY 30 Tab 5    PriLOSEC OTC 20 mg tablet TAKE ONE TABLET BY MOUTH EVERY DAY 28 Tab 5    hydroCHLOROthiazide (HYDRODIURIL) 25 mg tablet TAKE 1 TABLET BY MOUTH DAILY 30 Tab 5    triamcinolone acetonide (KENALOG) 0.1 % topical cream   3    ammonium lactate (AMLACTIN) 12 % topical cream   3    fluticasone (FLONASE) 50 mcg/actuation nasal spray 2 Sprays by Both Nostrils route daily. 1 Bottle 3    diclofenac (VOLTAREN) 1 % gel        No current facility-administered medications on file prior to visit.

## 2021-04-27 ENCOUNTER — TELEPHONE (OUTPATIENT)
Dept: FAMILY MEDICINE CLINIC | Age: 41
End: 2021-04-27

## 2021-04-27 LAB
ANION GAP SERPL CALC-SCNC: 5 MMOL/L (ref 5–15)
BUN SERPL-MCNC: 14 MG/DL (ref 6–20)
BUN/CREAT SERPL: 15 (ref 12–20)
CALCIUM SERPL-MCNC: 9.3 MG/DL (ref 8.5–10.1)
CHLORIDE SERPL-SCNC: 104 MMOL/L (ref 97–108)
CHOLEST SERPL-MCNC: 171 MG/DL
CO2 SERPL-SCNC: 31 MMOL/L (ref 21–32)
CREAT SERPL-MCNC: 0.96 MG/DL (ref 0.55–1.02)
GLUCOSE SERPL-MCNC: 76 MG/DL (ref 65–100)
HDLC SERPL-MCNC: 87 MG/DL
HDLC SERPL: 2 {RATIO} (ref 0–5)
LDLC SERPL CALC-MCNC: 72.4 MG/DL (ref 0–100)
LIPID PROFILE,FLP: NORMAL
POTASSIUM SERPL-SCNC: 4 MMOL/L (ref 3.5–5.1)
SODIUM SERPL-SCNC: 140 MMOL/L (ref 136–145)
TRIGL SERPL-MCNC: 58 MG/DL (ref ?–150)
VLDLC SERPL CALC-MCNC: 11.6 MG/DL

## 2021-04-27 NOTE — TELEPHONE ENCOUNTER
Called pt to advise of recent results. Left message to call back and will send letter. If she calls back please let her know:    Your labs look good. Your X-ray shows some arthritis in the knee. Let's continue with the plan for you to use the diclofenac gel and naproxen pills as needed. If you are not getting better with this, please call the office.

## 2021-05-10 ENCOUNTER — TELEPHONE (OUTPATIENT)
Dept: FAMILY MEDICINE CLINIC | Age: 41
End: 2021-05-10

## 2021-06-24 DIAGNOSIS — I63.9 CEREBROVASCULAR ACCIDENT (CVA), UNSPECIFIED MECHANISM (HCC): ICD-10-CM

## 2021-06-24 DIAGNOSIS — I10 ESSENTIAL HYPERTENSION: ICD-10-CM

## 2021-06-24 DIAGNOSIS — K21.9 GASTROESOPHAGEAL REFLUX DISEASE, UNSPECIFIED WHETHER ESOPHAGITIS PRESENT: ICD-10-CM

## 2021-06-24 RX ORDER — GUAIFENESIN 100 MG/5ML
LIQUID (ML) ORAL
Qty: 30 TABLET | Refills: 0 | Status: SHIPPED | OUTPATIENT
Start: 2021-06-24 | End: 2021-10-04 | Stop reason: SDUPTHER

## 2021-06-24 RX ORDER — ATORVASTATIN CALCIUM 40 MG/1
TABLET, FILM COATED ORAL
Qty: 30 TABLET | Refills: 0 | Status: SHIPPED | OUTPATIENT
Start: 2021-06-24 | End: 2021-08-11

## 2021-06-24 RX ORDER — ALUMINUM ZIRCONIUM TRICHLOROHYDREX GLY 0.19 G/G
STICK TOPICAL
Qty: 28 TABLET | Refills: 0 | Status: SHIPPED | OUTPATIENT
Start: 2021-06-24 | End: 2021-08-11

## 2021-06-24 RX ORDER — ATENOLOL 50 MG/1
TABLET ORAL
Qty: 30 TABLET | Refills: 0 | Status: SHIPPED | OUTPATIENT
Start: 2021-06-24 | End: 2021-08-11

## 2021-06-24 NOTE — LETTER
6/24/2021 4:34 PM    Ms. [place] [facility] [address] [address]        Your good health is important to us, that is why we are sending you this friendly reminder. As always, our goal is to be your partner in life-long wellness. Our records indicate that you have not made an appointment for your Blood Pressure check.      Please call [facility] at (u) [phone] to schedule this  appointment at your earliest convenience            Sincerely,    [facility]RADE [facility]SELITO [facility]ENTENO [facility]

## 2021-06-24 NOTE — TELEPHONE ENCOUNTER
Christine Levo:  Refill for 30 days done however she missed her appointment for a BP check. Please call her or send a letter to reschedule ASAP.     Thank you,  Dr. Alejandro Krause

## 2021-08-11 DIAGNOSIS — I10 ESSENTIAL HYPERTENSION: ICD-10-CM

## 2021-08-11 DIAGNOSIS — I63.9 CEREBROVASCULAR ACCIDENT (CVA), UNSPECIFIED MECHANISM (HCC): ICD-10-CM

## 2021-08-11 DIAGNOSIS — K21.9 GASTROESOPHAGEAL REFLUX DISEASE, UNSPECIFIED WHETHER ESOPHAGITIS PRESENT: ICD-10-CM

## 2021-08-11 RX ORDER — ATORVASTATIN CALCIUM 40 MG/1
TABLET, FILM COATED ORAL
Qty: 30 TABLET | Refills: 0 | Status: SHIPPED | OUTPATIENT
Start: 2021-08-11 | End: 2021-10-04 | Stop reason: SDUPTHER

## 2021-08-11 RX ORDER — LISINOPRIL 40 MG/1
TABLET ORAL
Qty: 90 TABLET | Refills: 0 | Status: SHIPPED | OUTPATIENT
Start: 2021-08-11 | End: 2021-10-04 | Stop reason: SDUPTHER

## 2021-08-11 RX ORDER — ATENOLOL 50 MG/1
TABLET ORAL
Qty: 30 TABLET | Refills: 0 | Status: SHIPPED | OUTPATIENT
Start: 2021-08-11 | End: 2021-10-04 | Stop reason: SDUPTHER

## 2021-08-11 RX ORDER — ALUMINUM ZIRCONIUM TRICHLOROHYDREX GLY 0.19 G/G
STICK TOPICAL
Qty: 28 TABLET | Refills: 0 | Status: SHIPPED | OUTPATIENT
Start: 2021-08-11 | End: 2021-10-14 | Stop reason: SDUPTHER

## 2021-10-04 DIAGNOSIS — I63.9 CEREBROVASCULAR ACCIDENT (CVA), UNSPECIFIED MECHANISM (HCC): ICD-10-CM

## 2021-10-04 DIAGNOSIS — I10 ESSENTIAL HYPERTENSION: ICD-10-CM

## 2021-10-04 NOTE — TELEPHONE ENCOUNTER
----- Message from Bryanna Shin sent at 10/4/2021 10:58 AM EDT -----  Regarding: /Refill  Medication Refill    Caller (if not patient): n/a      Relationship of caller (if not patient): n/a      Best contact number(s): [phone]      Name of medication and dosage if known: \"Hydrochlorothiazide 25mg; Asprin 81mg chewable tablets; Lisinopril 40mg; Atorvastatin 40mg;  Atenolol 50mg; Prilosec\"      Is patient out of this medication (yes/no): Yes      Pharmacy name: [facility] listed in chart? (yes/no): n/a  Pharmacy phone number: [phone]      Details to clarify the request: n/a      Bryanna Shin

## 2021-10-05 RX ORDER — LISINOPRIL 40 MG/1
40 TABLET ORAL DAILY
Qty: 30 TABLET | Refills: 3 | Status: SHIPPED | OUTPATIENT
Start: 2021-10-05 | End: 2021-10-14 | Stop reason: SDUPTHER

## 2021-10-05 RX ORDER — ATORVASTATIN CALCIUM 40 MG/1
40 TABLET, FILM COATED ORAL DAILY
Qty: 30 TABLET | Refills: 3 | Status: SHIPPED | OUTPATIENT
Start: 2021-10-05 | End: 2021-10-14 | Stop reason: SDUPTHER

## 2021-10-05 RX ORDER — HYDROCHLOROTHIAZIDE 25 MG/1
25 TABLET ORAL DAILY
Qty: 30 TABLET | Refills: 3 | Status: SHIPPED | OUTPATIENT
Start: 2021-10-05 | End: 2021-10-14 | Stop reason: SDUPTHER

## 2021-10-05 RX ORDER — GUAIFENESIN 100 MG/5ML
LIQUID (ML) ORAL
Qty: 30 TABLET | Refills: 3 | Status: SHIPPED | OUTPATIENT
Start: 2021-10-05 | End: 2021-10-14 | Stop reason: SDUPTHER

## 2021-10-05 RX ORDER — ATENOLOL 50 MG/1
50 TABLET ORAL DAILY
Qty: 30 TABLET | Refills: 3 | Status: SHIPPED | OUTPATIENT
Start: 2021-10-05 | End: 2021-10-14 | Stop reason: SDUPTHER

## 2021-10-05 NOTE — TELEPHONE ENCOUNTER
Pt has an appointment for October 14 @ 9:20. Can she get enough medication to last until her appointment. She has not taken her meds in almost a month. I advised her to call and cancel or reschedule her appointments so she will not get No Shows.

## 2021-10-14 ENCOUNTER — OFFICE VISIT (OUTPATIENT)
Dept: FAMILY MEDICINE CLINIC | Age: 41
End: 2021-10-14
Payer: MEDICARE

## 2021-10-14 VITALS
DIASTOLIC BLOOD PRESSURE: 79 MMHG | HEIGHT: 65 IN | BODY MASS INDEX: 31.49 KG/M2 | TEMPERATURE: 98.9 F | RESPIRATION RATE: 20 BRPM | HEART RATE: 70 BPM | WEIGHT: 189 LBS | SYSTOLIC BLOOD PRESSURE: 143 MMHG | OXYGEN SATURATION: 99 %

## 2021-10-14 DIAGNOSIS — K21.9 GASTROESOPHAGEAL REFLUX DISEASE, UNSPECIFIED WHETHER ESOPHAGITIS PRESENT: ICD-10-CM

## 2021-10-14 DIAGNOSIS — M62.838 MUSCLE SPASMS OF BOTH LOWER EXTREMITIES: Primary | ICD-10-CM

## 2021-10-14 DIAGNOSIS — G89.29 OTHER CHRONIC PAIN: ICD-10-CM

## 2021-10-14 DIAGNOSIS — I10 ESSENTIAL HYPERTENSION: ICD-10-CM

## 2021-10-14 DIAGNOSIS — I63.9 CEREBROVASCULAR ACCIDENT (CVA), UNSPECIFIED MECHANISM (HCC): ICD-10-CM

## 2021-10-14 LAB
ALBUMIN SERPL-MCNC: 3.9 G/DL (ref 3.5–5)
ALBUMIN/GLOB SERPL: 1.2 {RATIO} (ref 1.1–2.2)
ALP SERPL-CCNC: 49 U/L (ref 45–117)
ALT SERPL-CCNC: 19 U/L (ref 12–78)
ANION GAP SERPL CALC-SCNC: 5 MMOL/L (ref 5–15)
AST SERPL-CCNC: 24 U/L (ref 15–37)
BILIRUB SERPL-MCNC: 0.4 MG/DL (ref 0.2–1)
BUN SERPL-MCNC: 15 MG/DL (ref 6–20)
BUN/CREAT SERPL: 14 (ref 12–20)
CALCIUM SERPL-MCNC: 9 MG/DL (ref 8.5–10.1)
CHLORIDE SERPL-SCNC: 103 MMOL/L (ref 97–108)
CHOLEST SERPL-MCNC: 180 MG/DL
CO2 SERPL-SCNC: 30 MMOL/L (ref 21–32)
CREAT SERPL-MCNC: 1.11 MG/DL (ref 0.55–1.02)
GLOBULIN SER CALC-MCNC: 3.3 G/DL (ref 2–4)
GLUCOSE SERPL-MCNC: 85 MG/DL (ref 65–100)
HDLC SERPL-MCNC: 80 MG/DL
HDLC SERPL: 2.3 {RATIO} (ref 0–5)
LDLC SERPL CALC-MCNC: 89.6 MG/DL (ref 0–100)
POTASSIUM SERPL-SCNC: 3.9 MMOL/L (ref 3.5–5.1)
PROT SERPL-MCNC: 7.2 G/DL (ref 6.4–8.2)
SODIUM SERPL-SCNC: 138 MMOL/L (ref 136–145)
TRIGL SERPL-MCNC: 52 MG/DL (ref ?–150)
VLDLC SERPL CALC-MCNC: 10.4 MG/DL

## 2021-10-14 PROCEDURE — 99214 OFFICE O/P EST MOD 30 MIN: CPT | Performed by: FAMILY MEDICINE

## 2021-10-14 PROCEDURE — G8427 DOCREV CUR MEDS BY ELIG CLIN: HCPCS | Performed by: FAMILY MEDICINE

## 2021-10-14 PROCEDURE — G9717 DOC PT DX DEP/BP F/U NT REQ: HCPCS | Performed by: FAMILY MEDICINE

## 2021-10-14 PROCEDURE — G8754 DIAS BP LESS 90: HCPCS | Performed by: FAMILY MEDICINE

## 2021-10-14 PROCEDURE — G8417 CALC BMI ABV UP PARAM F/U: HCPCS | Performed by: FAMILY MEDICINE

## 2021-10-14 PROCEDURE — G8753 SYS BP > OR = 140: HCPCS | Performed by: FAMILY MEDICINE

## 2021-10-14 RX ORDER — GUAIFENESIN 100 MG/5ML
LIQUID (ML) ORAL
Qty: 90 TABLET | Refills: 1 | Status: SHIPPED | OUTPATIENT
Start: 2021-10-14 | End: 2022-01-07 | Stop reason: SDUPTHER

## 2021-10-14 RX ORDER — ATORVASTATIN CALCIUM 40 MG/1
40 TABLET, FILM COATED ORAL DAILY
Qty: 90 TABLET | Refills: 1 | Status: SHIPPED | OUTPATIENT
Start: 2021-10-14 | End: 2022-01-07 | Stop reason: SDUPTHER

## 2021-10-14 RX ORDER — TIZANIDINE 4 MG/1
4 TABLET ORAL
COMMUNITY
Start: 2021-04-28 | End: 2021-10-14

## 2021-10-14 RX ORDER — CYCLOBENZAPRINE HCL 10 MG
10 TABLET ORAL
Qty: 30 TABLET | Refills: 1 | Status: SHIPPED | OUTPATIENT
Start: 2021-10-14 | End: 2022-01-24 | Stop reason: SDUPTHER

## 2021-10-14 RX ORDER — NAPROXEN 500 MG/1
500 TABLET ORAL
Qty: 180 TABLET | Refills: 1 | Status: SHIPPED | OUTPATIENT
Start: 2021-10-14 | End: 2022-01-24 | Stop reason: SDUPTHER

## 2021-10-14 RX ORDER — ATENOLOL 50 MG/1
50 TABLET ORAL DAILY
Qty: 90 TABLET | Refills: 1 | Status: SHIPPED | OUTPATIENT
Start: 2021-10-14 | End: 2022-01-07 | Stop reason: SDUPTHER

## 2021-10-14 RX ORDER — LISINOPRIL 40 MG/1
40 TABLET ORAL DAILY
Qty: 90 TABLET | Refills: 1 | Status: SHIPPED | OUTPATIENT
Start: 2021-10-14 | End: 2022-01-24 | Stop reason: SDUPTHER

## 2021-10-14 RX ORDER — HYDROCHLOROTHIAZIDE 25 MG/1
25 TABLET ORAL DAILY
Qty: 90 TABLET | Refills: 1 | Status: SHIPPED | OUTPATIENT
Start: 2021-10-14 | End: 2022-01-07 | Stop reason: SDUPTHER

## 2021-10-14 RX ORDER — OMEPRAZOLE 20 MG/1
20 TABLET, DELAYED RELEASE ORAL DAILY
Qty: 90 TABLET | Refills: 1 | Status: SHIPPED | OUTPATIENT
Start: 2021-10-14 | End: 2022-01-07 | Stop reason: SDUPTHER

## 2021-10-14 NOTE — PROGRESS NOTES
1. Have you been to the ER, urgent care clinic since your last visit? Hospitalized since your last visit? No    2. Have you seen or consulted any other health care providers outside of the [address] [place] [address] since your last visit? Include any pap smears or colon screening.  No  Reviewed record in preparation for visit and have necessary documentation  Pt did not bring medication to office visit for review    Goals that were addressed and/or need to be completed during or after this appointment include   Health Maintenance Due   Topic Date Due    Hepatitis C Screening  Never done    COVID-19 Vaccine (1) Never done    DTaP/Tdap/Td series (1 - Tdap) Never done    Medicare Yearly Exam  Never done    Flu Vaccine (1) 09/01/2021

## 2021-10-14 NOTE — PATIENT INSTRUCTIONS
Weight Loss Diet Guidelines      Eat ONLY when you are hungry, and stop just before you are full. If you are eating enough fat in your meals, you will feel full for 5-6 hours after, and you can avoid snacks. This is your goal.     Eat More of these Foods:    Meat: Beef, lamb, pork, chicken and others  Fish: Any kind of fish  Eggs: As many as you want, with the yolk  Vegetables: ANY vegetables but limit starchy vegetables like potatoes, corn, carrots or peas  Nuts and Seeds: No more than one handful a day  High-fat Dairy: Cheese, butter, heavy cream      Eat Less of this, but OK Rarely:    Fruits: Berries and Melon are best. May have one handful per day. Limit other fruits, particularly bananas, grapes, mangos, and pineapple  DO NOT DRINK ANY JUICE, EVER. Whole grains: Oatmeal, quinoa, brown rice  Legumes: Beans, lentils      Do Not Eat these Foods:    Drinks with calories: Sodas, fruit juices, sweet tea, sports drinks (gatorade, etc)  Sugar: Honey, agave, candy, cake, cookies, ice cream  Grains: Bread, pasta, crackers, cereal, chips  Yogurt: Most yogurt is as bad as candy. Eat only high-fat, plain yogurt, like Fage 2% plain. Trans Fats: \"Hydrogenated\" or \"partially hydrogenated\" oils, margarine  \"Diet\" and \"Low fat\" Products  Highly processed foods. If it looks like it was made in a factory, don't eat it. If it has more than 5 ingredients, it is processed. What Should Meals Look Like? Breakfast: Matthew Bourgeois, eggs, sausage or plain yogurt with berries  Lunch: Big salad with meat, cheese, avocado, homemade dressing or olive oil and vinegar. Or meat, chicken, fish and vegetables. Dinner: Meat, chicken or fish and vegetables, or salad, like above  Snack: Berries, cheese, nuts  Drinks:Plenty of water.  May also have coffee, tea, or artificially sweetened beverages (but not too many)

## 2021-10-14 NOTE — PROGRESS NOTES
CC: f/u HTN, HLD    HPI: Pt is a 37 y.o. female who presents for f/u HTN, HLD. She is a poor historian and is alone today. She did not bring her medications. She is taking 6 pills a day. She has chronic pain on the L side of her body since her stroke. She has tried multiple medications without a lot of benefit but today reports that naproxen 500mg has helped and requests a refill. She also gets muscle spasms in her legs that have responded well to muscle relaxants in the past. She is not currently taking any muscle relaxants.        Past Medical History:   Diagnosis Date    Depression     GERD (gastroesophageal reflux disease)     Headache     Hypertension     Neuropathic pain of forearm, left     Neuropathic pain of left lower extremity     Stroke [facility]) 11/2017 2017       Family History   Problem Relation Age of Onset    Schizophrenia Mother     Depression Mother     Anxiety Mother     Asthma Mother     Crohn's Disease Mother     Stroke Father     Hypertension Father     No Known Problems Sister     No Known Problems Brother     Other Son         pyloric stenosis    Cancer Maternal Grandfather         Lung    Other Paternal Grandfather         Aneursym    Colon Cancer Maternal Uncle     Cancer Maternal Uncle         Brain cancer       Social History     Tobacco Use    Smoking status: Former Smoker     Packs/day: 1.80     Years: 10.00     Pack years: 18.00     Types: Cigarettes    Smokeless tobacco: Never Used   Substance Use Topics    Alcohol use: No    Drug use: Yes     Types: Marijuana       ROS:  Per HPI    PE:  Visit Vitals  BP (!) 143/79   Pulse 70   Temp 98.9 °F (37.2 °C)   Resp 20   Ht 5' 5\" (1.651 m)   Wt 189 lb (85.7 kg)   SpO2 99%   BMI 31.45 kg/m²     Gen: Pt sitting in chair, in NAD  Head: Normocephalic, atraumatic  Eyes: Sclera anicteric, EOM grossly intact, PERRL  Nose: Normal nasal mucosa  Throat: MMM, normal lips, tongue, teeth and gums  Neck: Supple, no LAD, no thyromegaly or carotid bruits  CVS: Normal S1, S2, no m/r/g  Resp: CTAB, no wheezes or rales  Extrem: Atraumatic, no cyanosis or edema  Pulses: 2+   Skin: Warm, dry  Neuro: Alert      A/P:   Encounter Diagnoses     ICD-10-CM ICD-9-CM   1. Muscle spasms of both lower extremities  M62.838 728.85   2. Essential hypertension  I10 401.9   3. Cerebrovascular accident (CVA), unspecified mechanism ([facility]  I63.9 434.91   4. Gastroesophageal reflux disease, unspecified whether esophagitis present  K21.9 530.81   5. Other chronic pain  G89.29 338.29     1. Essential hypertension: BP elevated today but unclear if patient has been taking medications regularly. All medications refilled today and pt advised to call if her BP is >140/90. She has ordered a BP machine.   - atenoloL (TENORMIN) 50 mg tablet; Take 1 Tablet by mouth daily. Dispense: 90 Tablet; Refill: 1  - hydroCHLOROthiazide (HYDRODIURIL) 25 mg tablet; Take 1 Tablet by mouth daily. Dispense: 90 Tablet; Refill: 1  - lisinopriL (PRINIVIL, ZESTRIL) 40 mg tablet; Take 1 Tablet by mouth daily. Dispense: 90 Tablet; Refill: 1  - METABOLIC PANEL, COMPREHENSIVE; Future  - LIPID PANEL; Future  - LIPID PANEL  - METABOLIC PANEL, COMPREHENSIVE    2. Cerebrovascular accident (CVA), unspecified mechanism ([facility]  - atorvastatin (LIPITOR) 40 mg tablet; Take 1 Tablet by mouth daily. Dispense: 90 Tablet; Refill: 1  - aspirin 81 mg chewable tablet; CHEW AND SWALLOW ONE TABLET BY MOUTH EVERY DAY  Dispense: 90 Tablet; Refill: 1    3. Gastroesophageal reflux disease, unspecified whether esophagitis present: Stable on PPI  - omeprazole (PriLOSEC OTC) 20 mg tablet; Take 1 Tablet by mouth daily. Dispense: 90 Tablet; Refill: 1    4. Chronic pain:   - naproxen (NAPROSYN) 500 mg tablet; Take 1 Tablet by mouth two (2) times daily as needed for Pain. Dispense: 180 Tablet; Refill: 1    5. Muscle spasms of both lower extremities: Pt uses sparingly  - cyclobenzaprine (FLEXERIL) 10 mg tablet;  Take 1 Tablet by mouth three (3) times daily as needed for Muscle Spasm(s). Dispense: 30 Tablet; Refill: 1        RTC in 3 months for f/u chronic conditions, or sooner prn. Pt advised to call if BP is measuring >140/90 after taking all of her medications regularly. I have reviewed/discussed the above normal BMI with the patient. I have recommended the following interventions: dietary management education, guidance, and counseling . Sherrie Brower Weight loss diet handout given. Discussed diagnoses in detail with patient. Medication risks/benefits/side effects discussed with patient. All of the patient's questions were addressed. The patient understands and agrees with our plan of care. The patient knows to call back if they are unsure of or forget any changes we discussed today or if the symptoms change. The patient received an After-Visit Summary which contains VS, orders, medication list and allergy list. This can be used as a \"mini-medical record\" should they have to seek medical care while out of town. Current Outpatient Medications on File Prior to Visit   Medication Sig Dispense Refill    triamcinolone acetonide (KENALOG) 0.1 % topical cream   3    ammonium lactate (AMLACTIN) 12 % topical cream   3    fluticasone (FLONASE) 50 mcg/actuation nasal spray 2 Sprays by Both Nostrils route daily. 1 Bottle 3     No current facility-administered medications on file prior to visit.

## 2021-11-22 ENCOUNTER — VIRTUAL VISIT (OUTPATIENT)
Dept: FAMILY MEDICINE CLINIC | Age: 41
End: 2021-11-22

## 2022-01-07 DIAGNOSIS — I63.9 CEREBROVASCULAR ACCIDENT (CVA), UNSPECIFIED MECHANISM (HCC): ICD-10-CM

## 2022-01-07 DIAGNOSIS — K21.9 GASTROESOPHAGEAL REFLUX DISEASE, UNSPECIFIED WHETHER ESOPHAGITIS PRESENT: ICD-10-CM

## 2022-01-07 DIAGNOSIS — J30.1 SEASONAL ALLERGIC RHINITIS DUE TO POLLEN: ICD-10-CM

## 2022-01-07 DIAGNOSIS — I10 ESSENTIAL HYPERTENSION: ICD-10-CM

## 2022-01-07 RX ORDER — OMEPRAZOLE 20 MG/1
20 TABLET, DELAYED RELEASE ORAL DAILY
Qty: 90 TABLET | Refills: 1 | Status: SHIPPED | OUTPATIENT
Start: 2022-01-07 | End: 2022-01-24 | Stop reason: SDUPTHER

## 2022-01-07 RX ORDER — ATENOLOL 50 MG/1
50 TABLET ORAL DAILY
Qty: 90 TABLET | Refills: 1 | Status: SHIPPED | OUTPATIENT
Start: 2022-01-07 | End: 2022-01-24 | Stop reason: SDUPTHER

## 2022-01-07 RX ORDER — HYDROCHLOROTHIAZIDE 25 MG/1
25 TABLET ORAL DAILY
Qty: 90 TABLET | Refills: 1 | Status: SHIPPED | OUTPATIENT
Start: 2022-01-07 | End: 2022-01-24 | Stop reason: SDUPTHER

## 2022-01-07 RX ORDER — GUAIFENESIN 100 MG/5ML
LIQUID (ML) ORAL
Qty: 90 TABLET | Refills: 1 | Status: SHIPPED | OUTPATIENT
Start: 2022-01-07 | End: 2022-01-24 | Stop reason: SDUPTHER

## 2022-01-07 RX ORDER — ATORVASTATIN CALCIUM 40 MG/1
40 TABLET, FILM COATED ORAL DAILY
Qty: 90 TABLET | Refills: 1 | Status: SHIPPED | OUTPATIENT
Start: 2022-01-07 | End: 2022-01-24 | Stop reason: SDUPTHER

## 2022-01-07 RX ORDER — TRIAMCINOLONE ACETONIDE 1 MG/G
CREAM TOPICAL
Qty: 15 G | Refills: 3 | Status: SHIPPED | OUTPATIENT
Start: 2022-01-07

## 2022-01-07 RX ORDER — CETIRIZINE HYDROCHLORIDE 1 MG/ML
2 SOLUTION ORAL
Qty: 300 ML | Refills: 3 | Status: SHIPPED | OUTPATIENT
Start: 2022-01-07 | End: 2022-01-24 | Stop reason: SDUPTHER

## 2022-01-07 RX ORDER — FLUTICASONE PROPIONATE 50 MCG
2 SPRAY, SUSPENSION (ML) NASAL DAILY
Qty: 3 EACH | Refills: 1 | Status: SHIPPED | OUTPATIENT
Start: 2022-01-07

## 2022-01-07 NOTE — TELEPHONE ENCOUNTER
Pt is supposed to be getting 90 day of each of her medications. She states she has been getting 30 days of most of them.

## 2022-01-21 DIAGNOSIS — I63.9 CEREBROVASCULAR ACCIDENT (CVA), UNSPECIFIED MECHANISM (HCC): ICD-10-CM

## 2022-01-21 DIAGNOSIS — K21.9 GASTROESOPHAGEAL REFLUX DISEASE, UNSPECIFIED WHETHER ESOPHAGITIS PRESENT: ICD-10-CM

## 2022-01-21 DIAGNOSIS — G89.29 OTHER CHRONIC PAIN: ICD-10-CM

## 2022-01-21 DIAGNOSIS — M62.838 MUSCLE SPASMS OF BOTH LOWER EXTREMITIES: ICD-10-CM

## 2022-01-21 DIAGNOSIS — I10 ESSENTIAL HYPERTENSION: ICD-10-CM

## 2022-01-21 NOTE — TELEPHONE ENCOUNTER
Once received will make available for MD to review and sign  Is this a mail order pharmacy?   Recent refill sent to local pharmacy

## 2022-01-21 NOTE — TELEPHONE ENCOUNTER
Attempted to call pt . Unsuccessful.  message left  Need clarification if pt is requesting mail order pharmacy and which mediations she will need refilled  Recent refills sent locally  No mail order pharmacy is set up

## 2022-01-21 NOTE — TELEPHONE ENCOUNTER
[facility] called and stated that they sent over a request for medicationsto be transferred on 1-19, and are still waiting to hear back.

## 2022-01-24 RX ORDER — ATENOLOL 50 MG/1
50 TABLET ORAL DAILY
Qty: 90 TABLET | Refills: 1 | Status: SHIPPED | OUTPATIENT
Start: 2022-01-24 | End: 2022-09-09 | Stop reason: SDUPTHER

## 2022-01-24 RX ORDER — NAPROXEN 500 MG/1
500 TABLET ORAL
Qty: 180 TABLET | Refills: 0 | Status: SHIPPED | OUTPATIENT
Start: 2022-01-24 | End: 2022-09-09 | Stop reason: SDUPTHER

## 2022-01-24 RX ORDER — GUAIFENESIN 100 MG/5ML
LIQUID (ML) ORAL
Qty: 90 TABLET | Refills: 1 | Status: SHIPPED | OUTPATIENT
Start: 2022-01-24 | End: 2022-09-09 | Stop reason: SDUPTHER

## 2022-01-24 RX ORDER — DICLOFENAC SODIUM 10 MG/G
4 GEL TOPICAL
Qty: 300 G | Refills: 1 | Status: SHIPPED | OUTPATIENT
Start: 2022-01-24

## 2022-01-24 RX ORDER — ATORVASTATIN CALCIUM 40 MG/1
40 TABLET, FILM COATED ORAL DAILY
Qty: 90 TABLET | Refills: 1 | Status: SHIPPED | OUTPATIENT
Start: 2022-01-24 | End: 2022-09-09 | Stop reason: SDUPTHER

## 2022-01-24 RX ORDER — OMEPRAZOLE 20 MG/1
20 TABLET, DELAYED RELEASE ORAL DAILY
Qty: 90 TABLET | Refills: 1 | Status: SHIPPED | OUTPATIENT
Start: 2022-01-24 | End: 2022-09-09 | Stop reason: SDUPTHER

## 2022-01-24 RX ORDER — CYCLOBENZAPRINE HCL 10 MG
10 TABLET ORAL
Qty: 180 TABLET | Refills: 0 | Status: SHIPPED | OUTPATIENT
Start: 2022-01-24 | End: 2022-02-27

## 2022-01-24 RX ORDER — LISINOPRIL 40 MG/1
40 TABLET ORAL DAILY
Qty: 90 TABLET | Refills: 1 | Status: SHIPPED | OUTPATIENT
Start: 2022-01-24 | End: 2022-09-09 | Stop reason: SDUPTHER

## 2022-01-24 RX ORDER — HYDROCHLOROTHIAZIDE 25 MG/1
25 TABLET ORAL DAILY
Qty: 90 TABLET | Refills: 1 | Status: SHIPPED | OUTPATIENT
Start: 2022-01-24 | End: 2022-09-09 | Stop reason: SDUPTHER

## 2022-01-24 RX ORDER — CETIRIZINE HYDROCHLORIDE 1 MG/ML
2 SOLUTION ORAL
Qty: 300 ML | Refills: 3 | Status: SHIPPED | OUTPATIENT
Start: 2022-01-24

## 2022-01-24 NOTE — TELEPHONE ENCOUNTER
I filled her meds for mail order pharmacy but she has not submitted Bp readings from home since LOV. Naprosyn can make her BP go up so she should not be on this medication unless Bp is controlled.

## 2022-01-25 ENCOUNTER — TELEPHONE (OUTPATIENT)
Dept: FAMILY MEDICINE CLINIC | Age: 42
End: 2022-01-25

## 2022-02-26 DIAGNOSIS — M62.838 MUSCLE SPASMS OF BOTH LOWER EXTREMITIES: ICD-10-CM

## 2022-02-27 RX ORDER — CYCLOBENZAPRINE HCL 10 MG
TABLET ORAL
Qty: 30 TABLET | Refills: 0 | Status: SHIPPED | OUTPATIENT
Start: 2022-02-27 | End: 2022-09-09 | Stop reason: SDUPTHER

## 2022-03-10 ENCOUNTER — TELEPHONE (OUTPATIENT)
Dept: FAMILY MEDICINE CLINIC | Age: 42
End: 2022-03-10

## 2022-03-19 PROBLEM — G93.9 BRAIN DAMAGE: Status: ACTIVE | Noted: 2018-05-24

## 2022-03-19 PROBLEM — R26.89 IMPAIRMENT OF BALANCE: Status: ACTIVE | Noted: 2019-02-19

## 2022-03-19 PROBLEM — G81.94 LEFT HEMIPARESIS (HCC): Status: ACTIVE | Noted: 2019-02-19

## 2022-09-09 DIAGNOSIS — I10 ESSENTIAL HYPERTENSION: ICD-10-CM

## 2022-09-09 DIAGNOSIS — I63.9 CEREBROVASCULAR ACCIDENT (CVA), UNSPECIFIED MECHANISM (HCC): ICD-10-CM

## 2022-09-09 DIAGNOSIS — M62.838 MUSCLE SPASMS OF BOTH LOWER EXTREMITIES: ICD-10-CM

## 2022-09-09 DIAGNOSIS — G89.29 OTHER CHRONIC PAIN: ICD-10-CM

## 2022-09-09 DIAGNOSIS — K21.9 GASTROESOPHAGEAL REFLUX DISEASE, UNSPECIFIED WHETHER ESOPHAGITIS PRESENT: ICD-10-CM

## 2022-09-09 NOTE — LETTER
9/14/2022 1:57 PM    Ms. Swetha Crabtree [address] [facility]    Your good health is important to us, that is why we are sending you this friendly reminder. As always, our goal is to be your partner in life-long wellness.     Our records indicate that you have not made an appointment for your Routine Care, Medication Refills     Please call [facility] at (p) [phone] to schedule this  appointment at your earliest convenience            Sincerely,      JAC ANDRADE [facility] JOSELITO CENTENO [facility]

## 2022-09-12 RX ORDER — GUAIFENESIN 100 MG/5ML
LIQUID (ML) ORAL
Qty: 90 TABLET | Refills: 0 | Status: SHIPPED | OUTPATIENT
Start: 2022-09-12

## 2022-09-12 RX ORDER — LISINOPRIL 40 MG/1
40 TABLET ORAL DAILY
Qty: 90 TABLET | Refills: 0 | Status: SHIPPED | OUTPATIENT
Start: 2022-09-12

## 2022-09-12 RX ORDER — OMEPRAZOLE 20 MG/1
20 TABLET, DELAYED RELEASE ORAL DAILY
Qty: 90 TABLET | Refills: 0 | Status: SHIPPED | OUTPATIENT
Start: 2022-09-12

## 2022-09-12 RX ORDER — ATENOLOL 50 MG/1
50 TABLET ORAL DAILY
Qty: 90 TABLET | Refills: 0 | Status: SHIPPED | OUTPATIENT
Start: 2022-09-12

## 2022-09-12 RX ORDER — NAPROXEN 500 MG/1
500 TABLET ORAL
Qty: 180 TABLET | Refills: 0 | Status: SHIPPED | OUTPATIENT
Start: 2022-09-12

## 2022-09-12 RX ORDER — CYCLOBENZAPRINE HCL 10 MG
TABLET ORAL
Qty: 30 TABLET | Refills: 0 | Status: SHIPPED | OUTPATIENT
Start: 2022-09-12

## 2022-09-12 RX ORDER — HYDROCHLOROTHIAZIDE 25 MG/1
25 TABLET ORAL DAILY
Qty: 90 TABLET | Refills: 0 | Status: SHIPPED | OUTPATIENT
Start: 2022-09-12

## 2022-09-12 RX ORDER — ATORVASTATIN CALCIUM 40 MG/1
40 TABLET, FILM COATED ORAL DAILY
Qty: 90 TABLET | Refills: 0 | Status: SHIPPED | OUTPATIENT
Start: 2022-09-12

## 2022-09-14 NOTE — TELEPHONE ENCOUNTER
Attempted to call. unsuccessful.  message left  Need to inform pt   Pt will need to schedule   per Dr Nhung Chapa  an appt to f/u chronic conditions    Letter sent

## 2023-01-24 ENCOUNTER — OFFICE VISIT (OUTPATIENT)
Dept: FAMILY MEDICINE CLINIC | Age: 43
End: 2023-01-24
Payer: MEDICARE

## 2023-01-24 VITALS
TEMPERATURE: 98.8 F | BODY MASS INDEX: 38.99 KG/M2 | SYSTOLIC BLOOD PRESSURE: 169 MMHG | WEIGHT: 234 LBS | RESPIRATION RATE: 20 BRPM | HEART RATE: 75 BPM | OXYGEN SATURATION: 98 % | DIASTOLIC BLOOD PRESSURE: 94 MMHG | HEIGHT: 65 IN

## 2023-01-24 DIAGNOSIS — I10 ESSENTIAL HYPERTENSION: ICD-10-CM

## 2023-01-24 DIAGNOSIS — I63.9 CEREBROVASCULAR ACCIDENT (CVA), UNSPECIFIED MECHANISM (HCC): ICD-10-CM

## 2023-01-24 DIAGNOSIS — H53.8 BLURRY VISION: ICD-10-CM

## 2023-01-24 DIAGNOSIS — J30.1 SEASONAL ALLERGIC RHINITIS DUE TO POLLEN: ICD-10-CM

## 2023-01-24 DIAGNOSIS — Z13.39 SCREENING FOR ALCOHOLISM: ICD-10-CM

## 2023-01-24 DIAGNOSIS — G81.94 LEFT HEMIPARESIS (HCC): ICD-10-CM

## 2023-01-24 DIAGNOSIS — Z11.59 NEED FOR HEPATITIS C SCREENING TEST: ICD-10-CM

## 2023-01-24 DIAGNOSIS — K21.9 GASTROESOPHAGEAL REFLUX DISEASE, UNSPECIFIED WHETHER ESOPHAGITIS PRESENT: ICD-10-CM

## 2023-01-24 DIAGNOSIS — M62.838 MUSCLE SPASMS OF BOTH LOWER EXTREMITIES: ICD-10-CM

## 2023-01-24 DIAGNOSIS — Z71.89 ADVANCED DIRECTIVES, COUNSELING/DISCUSSION: ICD-10-CM

## 2023-01-24 DIAGNOSIS — Z86.73 HISTORY OF CVA (CEREBROVASCULAR ACCIDENT): ICD-10-CM

## 2023-01-24 DIAGNOSIS — Z00.00 WELCOME TO MEDICARE PREVENTIVE VISIT: Primary | ICD-10-CM

## 2023-01-24 DIAGNOSIS — Z13.9 ENCOUNTER FOR SCREENING: ICD-10-CM

## 2023-01-24 RX ORDER — GUAIFENESIN 100 MG/5ML
LIQUID (ML) ORAL
Qty: 90 TABLET | Refills: 0 | Status: SHIPPED | OUTPATIENT
Start: 2023-01-24

## 2023-01-24 RX ORDER — IBUPROFEN 800 MG/1
800 TABLET ORAL
Qty: 30 TABLET | Refills: 0 | Status: SHIPPED | OUTPATIENT
Start: 2023-01-24 | End: 2023-02-07

## 2023-01-24 RX ORDER — HYDROCHLOROTHIAZIDE 25 MG/1
25 TABLET ORAL DAILY
Qty: 90 TABLET | Refills: 0 | Status: SHIPPED | OUTPATIENT
Start: 2023-01-24

## 2023-01-24 RX ORDER — OMEPRAZOLE 20 MG/1
20 TABLET, DELAYED RELEASE ORAL DAILY
Qty: 90 TABLET | Refills: 0 | Status: SHIPPED | OUTPATIENT
Start: 2023-01-24

## 2023-01-24 RX ORDER — LISINOPRIL 40 MG/1
40 TABLET ORAL DAILY
Qty: 90 TABLET | Refills: 0 | Status: SHIPPED | OUTPATIENT
Start: 2023-01-24

## 2023-01-24 RX ORDER — TRIAMCINOLONE ACETONIDE 1 MG/G
CREAM TOPICAL
Qty: 15 G | Refills: 3 | Status: SHIPPED | OUTPATIENT
Start: 2023-01-24

## 2023-01-24 RX ORDER — CETIRIZINE HYDROCHLORIDE 1 MG/ML
2 SOLUTION ORAL
Qty: 300 ML | Refills: 3 | Status: SHIPPED | OUTPATIENT
Start: 2023-01-24

## 2023-01-24 RX ORDER — DICLOFENAC SODIUM 10 MG/G
4 GEL TOPICAL
Qty: 300 G | Refills: 1 | Status: SHIPPED | OUTPATIENT
Start: 2023-01-24

## 2023-01-24 RX ORDER — ATORVASTATIN CALCIUM 40 MG/1
40 TABLET, FILM COATED ORAL DAILY
Qty: 90 TABLET | Refills: 0 | Status: SHIPPED | OUTPATIENT
Start: 2023-01-24

## 2023-01-24 RX ORDER — FLUTICASONE PROPIONATE 50 MCG
2 SPRAY, SUSPENSION (ML) NASAL DAILY
Qty: 3 EACH | Refills: 1 | Status: SHIPPED | OUTPATIENT
Start: 2023-01-24

## 2023-01-24 RX ORDER — CYCLOBENZAPRINE HCL 10 MG
TABLET ORAL
Qty: 30 TABLET | Refills: 0 | Status: SHIPPED | OUTPATIENT
Start: 2023-01-24

## 2023-01-24 RX ORDER — ACETAMINOPHEN 500 MG
1000 TABLET ORAL
Qty: 90 TABLET | Refills: 0 | Status: SHIPPED | OUTPATIENT
Start: 2023-01-24

## 2023-01-24 NOTE — PROGRESS NOTES
Linh King is an 40 y.o. female who presents for [address] and refills on all of her meds. Is s/p CVA and has pain on left side of her body. Takes naproxen PRN and smokes marijuana for it. Asking if there's a better medicine. Unwilling to see pain management. Refractory to gabapentin. On flexeril for the same thing. In on ASA and lipitor as she is s/p CVA. HTN: She is on HCTZ and lisinopril. Hasn't taken them in a while as she ran out. Wants me to look at her eyes as she sometimes has blurry vision that improves with eye rubbing. . No associated symptoms. No other complaints. No CP or SOB. No new neuro complaints except for this blurry vision. Allergies - reviewed: Allergies   Allergen Reactions    Amoxicillin Hives     On arms     Carvedilol Hives    Tramadol Nausea Only     Headaches  Patient states not allergic         Medications - reviewed:   Current Outpatient Medications   Medication Sig    acetaminophen (TYLENOL) 500 mg tablet Take 2 Tablets by mouth every eight (8) hours as needed for Pain. ibuprofen (MOTRIN) 800 mg tablet Take 1 Tablet by mouth every eight (8) hours as needed for Pain for up to 14 days. omeprazole (PriLOSEC OTC) 20 mg tablet Take 1 Tablet by mouth daily. triamcinolone acetonide (KENALOG) 0.1 % topical cream Apply  to affected area two (2) times daily as needed for Skin Irritation. lisinopriL (PRINIVIL, ZESTRIL) 40 mg tablet Take 1 Tablet by mouth daily. hydroCHLOROthiazide (HYDRODIURIL) 25 mg tablet Take 1 Tablet by mouth daily. fluticasone propionate (FLONASE) 50 mcg/actuation nasal spray 2 Sprays by Both Nostrils route daily. diclofenac (VOLTAREN) 1 % gel Apply 4 g to affected area every six (6) hours as needed for Pain.     cyclobenzaprine (FLEXERIL) 10 mg tablet TAKE ONE TABLET BY MOUTH 3 TIMES DAILY AS NEEDED FOR MUSCLE SPASM(S)    cetirizine (ZYRTEC) 1 mg/mL solution Take 10 mL by mouth daily as needed for Allergies (nightly as needed). atorvastatin (LIPITOR) 40 mg tablet Take 1 Tablet by mouth daily. aspirin 81 mg chewable tablet CHEW AND SWALLOW ONE TABLET BY MOUTH EVERY DAY    atenoloL (TENORMIN) 50 mg tablet Take 1 Tablet by mouth daily. ammonium lactate (AMLACTIN) 12 % topical cream  (Patient not taking: Reported on 1/24/2023)     No current facility-administered medications for this visit.          Past Medical History - reviewed:  Past Medical History:   Diagnosis Date    Depression     GERD (gastroesophageal reflux disease)     Headache     Hypertension     Neuropathic pain of forearm, left     Neuropathic pain of left lower extremity     Stroke [facility]) 11/2017    2017    TMJ disease     per patient by ER diagnosis         Past Surgical History - reviewed:   Past Surgical History:   Procedure Laterality Date    HX DILATION AND CURETTAGE      D & C    DC UNLISTED PROCEDURE CARDIAC SURGERY  2018    implantable loop recorder for A fib         Social History - reviewed:  Social History     Socioeconomic History    Marital status: SINGLE     Spouse name: Not on file    Number of children: Not on file    Years of education: Not on file    Highest education level: Not on file   Occupational History    Not on file   Tobacco Use    Smoking status: Former     Packs/day: 1.80     Years: 10.00     Pack years: 18.00     Types: Cigarettes    Smokeless tobacco: Never   Substance and Sexual Activity    Alcohol use: No    Drug use: Yes     Types: Marijuana    Sexual activity: Yes     Partners: Male     Birth control/protection: Surgical   Other Topics Concern    Not on file   Social History Narrative    Not on file     Social Determinants of Health     Financial Resource Strain: Not on file   Food Insecurity: Not on file   Transportation Needs: Not on file   Physical Activity: Not on file   Stress: Not on file   Social Connections: Not on file   Intimate Partner Violence: Not on file   Housing Stability: Not on file         Family History - reviewed:  Family History   Problem Relation Age of Onset    Schizophrenia Mother     Depression Mother     Anxiety Mother     Asthma Mother     Crohn's Disease Mother     Stroke Father     Hypertension Father     No Known Problems Sister     No Known Problems Brother     Other Son         pyloric stenosis    Cancer Maternal Grandfather         Lung    Other Paternal Grandfather         Aneursym    Colon Cancer Maternal Uncle     Cancer Maternal Uncle         Brain cancer         Immunizations - reviewed:   Immunization History   Administered Date(s) Administered    Influenza, FLUARIX, FLULAVAL, FLUZONE (age 10 mo+) AND AFLURIA, (age 1 y+), PF, 0.5mL 10/25/2018         ROS  In HPI      Physical Exam  Visit Vitals  BP (!) 169/94 (BP 1 Location: Right upper arm, BP Patient Position: Sitting)   Pulse 75   Temp 98.8 °F (37.1 °C) (Oral)   Resp 20   Ht 5' 5\" (1.651 m)   Wt 234 lb (106.1 kg)   LMP 01/23/2023   SpO2 98%   BMI 38.94 kg/m²       General appearance - alert, well appearing, and in no distress  Eyes - pupils equal and reactive, extraocular eye movements intact, sclera anicteric, left eye normal, right eye normal, visual fields wnl  Chest - clear to auscultation, no wheezes, rales or rhonchi, symmetric air entry  Heart - normal rate, regular rhythm, normal S1, S2, no murmurs, rubs, clicks or gallops  Neurological - alert, oriented, normal speech, no focal findings or movement disorder noted  Extremities - peripheral pulses normal, no pedal edema, no clubbing or cyanosis  Skin - normal coloration and turgor, no rashes, no suspicious skin lesions noted      Assessment/Plan    ICD-10-CM ICD-9-CM    1.  History of CVA (cerebrovascular accident)  Z86.73 V12.54 LIPID PANEL      CBC W/O DIFF      METABOLIC PANEL, COMPREHENSIVE      HEMOGLOBIN A1C WITH EAG      HEMOGLOBIN A1C WITH EAG      METABOLIC PANEL, COMPREHENSIVE      CBC W/O DIFF      LIPID PANEL      2. Muscle spasms of both lower extremities  M62.838 728.85 acetaminophen (TYLENOL) 500 mg tablet      ibuprofen (MOTRIN) 800 mg tablet      cyclobenzaprine (FLEXERIL) 10 mg tablet      3. Left hemiparesis (HCC)  G81.94 342.90 LIPID PANEL      CBC W/O DIFF      METABOLIC PANEL, COMPREHENSIVE      HEMOGLOBIN A1C WITH EAG      HEMOGLOBIN A1C WITH EAG      METABOLIC PANEL, COMPREHENSIVE      CBC W/O DIFF      LIPID PANEL      4. Encounter for screening  Z13.9 V82.9 LIPID PANEL      CBC W/O DIFF      METABOLIC PANEL, COMPREHENSIVE      HEMOGLOBIN A1C WITH EAG      HEPATITIS C AB      HEPATITIS C AB      HEMOGLOBIN A1C WITH EAG      METABOLIC PANEL, COMPREHENSIVE      CBC W/O DIFF      LIPID PANEL      5. Advanced directives, counseling/discussion  Z71.89 V65.49 FULL CODE      6. Welcome to Medicare preventive visit  Z00.00 V70.0 ID ANNUAL ALCOHOL SCREEN 15 MIN      7. Need for hepatitis C screening test  Z11.59 V73.89 HEPATITIS C AB      HEPATITIS C AB      HEPATITIS C AB      HEPATITIS C AB      8. Screening for alcoholism  Z13.39 V79.1       9. Blurry vision  H53.8 368.8 REFERRAL TO OPHTHALMOLOGY      10. Gastroesophageal reflux disease, unspecified whether esophagitis present  K21.9 530.81 omeprazole (PriLOSEC OTC) 20 mg tablet      11. Essential hypertension  I10 401.9 lisinopriL (PRINIVIL, ZESTRIL) 40 mg tablet      hydroCHLOROthiazide (HYDRODIURIL) 25 mg tablet      12. Seasonal allergic rhinitis due to pollen  J30.1 477.0 fluticasone propionate (FLONASE) 50 mcg/actuation nasal spray      13. Cerebrovascular accident (CVA), unspecified [facility] ([facility]  I63.9 434.91 atorvastatin (LIPITOR) 40 mg tablet      aspirin 81 mg chewable tablet        Body pain: Takes naproxen PRN and smokes marijuana for it. Asking if there's a better medicine. Unwilling to see pain management. Refractory to gabapentin.  On flexeril for the same thing.  - Continue flexeril  - Stop naproxen  - Start motrin/tylenol at the same time Q8PRN  - Follow up with neuro    S/p CVA:  - Cont ASA, lipitor  - Check labs    HTN: Uncontrolled because she ran out. - Refilled/restarted HCTZ and lisinopril. - Close followup (2 weeks)    Vision changes: Exam wnl  - Optho followup    MWV: See other note    Follow-up and Dispositions    Return for follow up with PCP for chronic problems. .           I have discussed the diagnosis with the patient and the intended plan as seen in the above orders. Patient verbalized understanding of the plan and agrees with the plan. The patient has received an after-visit summary and questions were answered concerning future plans. I have discussed medication side effects and warnings with the patient as well. Informed patient to return to the office if new symptoms arise.         Vincent Whitney MD  Family Medicine Resident

## 2023-01-24 NOTE — PROGRESS NOTES
This is an Initial Medicare Annual Wellness Exam (AWV) (Performed 12 months after IPPE or effective date of Medicare Part B enrollment, Once in a lifetime)    I have reviewed the patient's medical history in detail and updated the computerized patient record. Assessment/Plan   Education and counseling provided:  Are appropriate based on today's review and evaluation  Diabetes screening test    1. History of CVA (cerebrovascular accident)  2. Muscle spasms of both lower extremities  -     acetaminophen (TYLENOL) 500 mg tablet; Take 2 Tablets by mouth every eight (8) hours as needed for Pain., Normal, Disp-90 Tablet, R-0  -     ibuprofen (MOTRIN) 800 mg tablet; Take 1 Tablet by mouth every eight (8) hours as needed for Pain for up to 14 days. , Normal, Disp-30 Tablet, R-0  3. Left hemiparesis ([facility]  4. Encounter for screening  5. Advanced directives, counseling/discussion  -     FULL CODE  6. Welcome to Medicare preventive visit  -     [place] ANNUAL ALCOHOL SCREEN 15 MIN  7. Need for hepatitis C screening test  -     HEPATITIS C AB; Future  8.  Screening for alcoholism       Depression Risk Factor Screening     3 most recent PHQ Screens 1/24/2023   Little interest or pleasure in doing things Nearly every day   Feeling down, depressed, irritable, or hopeless Nearly every day   Total Score PHQ 2 6   Trouble falling or staying asleep, or sleeping too much Nearly every day   Feeling tired or having little energy Nearly every day   Poor appetite, weight loss, or overeating Several days   Feeling bad about yourself - or that you are a failure or have let yourself or your family down More than half the days   Trouble concentrating on things such as school, work, reading, or watching TV Nearly every day   Moving or speaking so slowly that other people could have noticed; or the opposite being so fidgety that others notice Several days   Thoughts of being better off dead, or hurting yourself in some way Not at all   PHQ 9 Score 19   How difficult have these problems made it for you to do your work, take care of your home and get along with others Somewhat difficult       Alcohol & Drug Abuse Risk Screen    Do you average more than 1 drink per night or more than 7 drinks a week:  No    On any one occasion in the past three months have you have had more than 3 drinks containing alcohol:  No          Functional Ability and Level of Safety    Hearing: Hearing is good. Activities of Daily Living: The home contains: handrails and grab bars  Patient does total self care but has aid. Ambulation: with difficulty, uses a cane      Fall Risk:  Fall Risk Assessment, last 12 mths 2/11/2019   Able to walk? Yes   Fall in past 12 months? Yes   Number of falls in past 12 months 8 or more   Fall with injury? 1      Abuse Screen:  Patient is not abused       Cognitive Screening    Has your family/caregiver stated any concerns about your memory: no     Cognitive Screening: Normal - Verbal Fluency Test    Health Maintenance Due     Health Maintenance Due   Topic Date Due    Hepatitis C Screening  Never done    COVID-19 Vaccine (1) Never done    Depression Monitoring  Never done    DTaP/Tdap/Td series (1 - Tdap) Never done    Flu Vaccine (1) 08/01/2022    Lipid Screen  10/14/2022       Patient Care Team   Patient Care Team:  Mago Fernandez MD as PCP - General (Family Medicine)  Mago Fernandez MD as PCP - [facility]  Roxanne Ho DO as Consulting Provider (Neurology)  Xiomara Wright MD as Physician (Hematology and Oncology)  Bebe Quinteros MD as Physician (Cardiovascular Disease Physician)  Radha Novak NP as Nurse Practitioner (Neurosurgery)  Grzegorz Jones MD (Anesthesiology)  Ralph Bettencourt MD as Physician (Obstetrics & Gynecology)  Roxanne Ho DO (Osteopathic Manipulative Medicine)    History     Patient Active Problem List   Diagnosis Code    Depression F32. A    GERD (gastroesophageal reflux disease) K21.9 Hypertension I10    Brain damage G93.9    Impairment of balance R26.89    Left hemiparesis (HCC) G81.94    History of CVA (cerebrovascular accident) Z86.73     Past Medical History:   Diagnosis Date    Depression     GERD (gastroesophageal reflux disease)     Headache     Hypertension     Neuropathic pain of forearm, left     Neuropathic pain of left lower extremity     St[facility] 11/2017 2017    TMJ disease     per patient by ER diagnosis      Past Surgical History:   Procedure Laterality Date    HX DILATION AND CURETTAGE      D & C    [place] UNLISTED PROCEDURE CARDIAC SURGERY  2018    implantable loop recorder for A fib     Current Outpatient Medications   Medication Sig Dispense Refill    acetaminophen (TYLENOL) 500 mg tablet Take 2 Tablets by mouth every eight (8) hours as needed for Pain. 90 Tablet 0    ibuprofen (MOTRIN) 800 mg tablet Take 1 Tablet by mouth every eight (8) hours as needed for Pain for up to 14 days. 30 Tablet 0    atenoloL (TENORMIN) 50 mg tablet Take 1 Tablet by mouth daily. 90 Tablet 0    cyclobenzaprine (FLEXERIL) 10 mg tablet TAKE ONE TABLET BY MOUTH 3 TIMES DAILY AS NEEDED FOR MUSCLE SPASM(S) 30 Tablet 0    triamcinolone acetonide (KENALOG) 0.1 % topical cream Apply  to affected area two (2) times daily as needed for Skin Irritation. 15 g 3    atorvastatin (LIPITOR) 40 mg tablet Take 1 Tablet by mouth daily. (Patient not taking: Reported on 1/24/2023) 90 Tablet 0    lisinopriL (PRINIVIL, ZESTRIL) 40 mg tablet Take 1 Tablet by mouth daily. (Patient not taking: Reported on 1/24/2023) 90 Tablet 0    aspirin 81 mg chewable tablet CHEW AND SWALLOW ONE TABLET BY MOUTH EVERY DAY 90 Tablet 0    hydroCHLOROthiazide (HYDRODIURIL) 25 mg tablet Take 1 Tablet by mouth daily. (Patient not taking: Reported on 1/24/2023) 90 Tablet 0    omeprazole (PriLOSEC OTC) 20 mg tablet Take 1 Tablet by mouth daily.  (Patient not taking: Reported on 1/24/2023) 90 Tablet 0    cetirizine (ZYRTEC) 1 mg/mL solution Take 10 mL by mouth daily as needed for Allergies (nightly as needed). (Patient not taking: Reported on 1/24/2023) 300 mL 3    diclofenac (VOLTAREN) 1 % gel Apply 4 g to affected area every six (6) hours as needed for Pain. (Patient not taking: Reported on 1/24/2023) 300 g 1    fluticasone propionate (FLONASE) 50 mcg/actuation nasal spray 2 Sprays by Both Nostrils route daily.  (Patient not taking: Reported on 1/24/2023) 3 Each 1    ammonium lactate (AMLACTIN) 12 % topical cream  (Patient not taking: Reported on 1/24/2023)  3     Allergies   Allergen Reactions    Amoxicillin Hives     On arms     Carvedilol Hives    Tramadol Nausea Only     Headaches  Patient states not allergic       Family History   Problem Relation Age of Onset    Schizophrenia Mother     Depression Mother     Anxiety Mother     Asthma Mother     Crohn's Disease Mother     Stroke Father     Hypertension Father     No Known Problems Sister     No Known Problems Brother     Other Son         pyloric stenosis    Cancer Maternal Grandfather         Lung    Other Paternal Grandfather         Aneursym    Colon Cancer Maternal Uncle     Cancer Maternal Uncle         Brain cancer     Social History     Tobacco Use    Smoking status: Former     Packs/day: 1.80     Years: 10.00     Pack years: 18.00     Types: Cigarettes    Smokeless tobacco: Never   Substance Use Topics    Alcohol use: No       Luis A Anderson MD

## 2023-01-24 NOTE — PROGRESS NOTES
1. Have you been to the ER, urgent care clinic since your last visit? Hospitalized since your last visit?  no    2. Have you seen or consulted any other health care providers outside of the [address] [place] [address] since your last visit?  no      3. For patients aged 39-70: Has the patient had a colonoscopy / FIT/ Cologuard? na      If the patient is female:    4. For patients aged 41-77: Has the patient had a mammogram within the past 2 years? due    5.  For patients aged 21-65: Has the patient had a pap smear? due      Opportunity was given for questions    Goals that were addressed and/or need to be completed during or after this appointment include   Health Maintenance Due   Topic Date Due    Hepatitis C Screening  Never done    COVID-19 Vaccine (1) Never done    Depression Monitoring  Never done    DTaP/Tdap/Td series (1 - Tdap) Never done    Medicare Yearly Exam  Never done    Flu Vaccine (1) 08/01/2022    Lipid Screen  10/14/2022

## 2023-01-24 NOTE — ACP (ADVANCE CARE PLANNING)
Advance Care Planning     Advance Care Planning (ACP) Physician/NP/PA Conversation      Date of Conversation: 1/24/2023  Conducted with: Patient with Decision Making Capacity    Healthcare Decision Maker:   No healthcare decision makers have been documented. Click here to complete [address] including selection of the Healthcare Decision Maker Relationship (ie \"Primary\")      Today we documented Decision Maker(s) consistent with Legal Next of Kin hierarchy. Care Preferences:    Hospitalization: \"If your health worsens and it becomes clear that your chance of recovery is unlikely, what would be your preference regarding hospitalization? \"  The patient would prefer hospitalization. Ventilation: \"If you were unable to breathe on your own and your chance of recovery was unlikely, what would be your preference about the use of a ventilator (breathing machine) if it was available to you? \"   The patient would desire the use of a ventilator. Resuscitation: \"In the event your heart stopped as a result of an underlying serious health condition, would you want attempts to be made to restart your heart, or would you prefer a natural death? \"   Yes, attempt to resuscitate.     Additional topics discussed: treatment goals, hospitalization preferences, resuscitation preferences, and end of life care preferences (vegetative state/imminent death)    Conversation Outcomes / Follow-Up Plan:   ACP complete - no further action today  Reviewed DNR/DNI and patient elects Full Code (Attempt Resuscitation)     Length of Voluntary ACP Conversation in minutes:  16 minutes    Della Mckeon MD

## 2023-01-24 NOTE — PATIENT INSTRUCTIONS
Medicare Wellness Visit, Female     The best way to live healthy is to have a lifestyle where you eat a well-balanced diet, exercise regularly, limit alcohol use, and quit all forms of tobacco/nicotine, if applicable. Regular preventive services are another way to keep healthy. Preventive services (vaccines, screening tests, monitoring & exams) can help personalize your care plan, which helps you manage your own care. Screening tests can find health problems at the earliest stages, when they are easiest to treat. [facility] follows the current, evidence-based guidelines published by the [employer] ([facility]) when recommending preventive services for our patients. Because we follow these guidelines, sometimes recommendations change over time as research supports it. (For example, mammograms used to be recommended annually. Even though Medicare will still pay for an annual mammogram, the newer guidelines recommend a mammogram every two years for women of average risk). Of course, you and your doctor may decide to screen more often for some diseases, based on your risk and your co-morbidities (chronic disease you are already diagnosed with). Preventive services for you include:  - Medicare offers their members a free annual wellness visit, which is time for you and your primary care provider to discuss and plan for your preventive service needs.  Take advantage of this benefit every year!    -Over the age of 72 should receive the recommended pneumonia vaccines.    -All adults should have a flu vaccine yearly.  -All adults should have a tetanus vaccine every 10 years.   -Over the age 48 should receive the shingles vaccines.        -All adults should be screened once for Hepatitis C.  -All adults age 38-68 who are overweight should have a diabetes screening test once every three years.   -Other screening tests and preventive services for persons with diabetes include: an eye exam to screen for diabetic retinopathy, a kidney function test, a foot exam, and stricter control over your cholesterol.   -Cardiovascular screening for adults with routine risk involves an electrocardiogram (ECG) at intervals determined by your doctor.     -Colorectal cancer screenings should be done for adults age 39-70 with no increased risk factors for colorectal cancer. There are a number of acceptable methods of screening for this type of cancer. Each test has its own benefits and drawbacks. Discuss with your doctor what is most appropriate for you during your annual wellness visit. The different tests include: colonoscopy (considered the best screening method), a fecal occult blood test, a fecal DNA test, and sigmoidoscopy.    -Lung cancer screening is recommended annually with a low dose CT scan for adults between age 54 and 68, who have smoked at least 30 pack years (equivalent of 1 pack per day for 30 days), and who is a current smoker or quit less than 15 years ago.    -A bone mass density test is recommended when a woman turns 65 to screen for osteoporosis. This test is only recommended one time, as a screening. Some providers will use this same test as a disease monitoring tool if you already have osteoporosis. -Breast cancer screenings are recommended every other year for women of normal risk, age 54-69.    -Cervical cancer screenings for women over age 72 are only recommended with certain risk factors.      Here is a list of your current Health Maintenance items (your personalized list of preventive services) with a due date:  Health Maintenance Due   Topic Date Due    Hepatitis C Test  Never done    COVID-19 Vaccine (1) Never done    Depression Monitoring  Never done    DTaP/Tdap/Td  (1 - Tdap) Never done    Yearly Flu Vaccine (1) 08/01/2022    Cholesterol Test   10/14/2022

## 2023-01-25 ENCOUNTER — TELEPHONE (OUTPATIENT)
Dept: FAMILY MEDICINE CLINIC | Age: 43
End: 2023-01-25

## 2023-01-25 NOTE — TELEPHONE ENCOUNTER
Dr Remi Salazar is no longer with [facility].  Please refax referral for pt to see Dr Katherin Bosworth at [facility] fax # [phone]

## 2023-01-25 NOTE — TELEPHONE ENCOUNTER
Sharron Haas is not listed as a provider. Referral updated to add location as [facility] and removed prior listed provider. Faxed to number listed below.

## 2023-01-26 LAB — HCV AB S/CO SERPL IA: NORMAL

## 2023-01-28 LAB
ALBUMIN SERPL-MCNC: 4.5 G/DL (ref 3.8–4.8)
ALBUMIN/GLOB SERPL: 2 {RATIO} (ref 1.2–2.2)
ALP SERPL-CCNC: 60 IU/L (ref 44–121)
ALT SERPL-CCNC: 11 IU/L (ref 0–32)
AST SERPL-CCNC: 17 IU/L (ref 0–40)
BILIRUB SERPL-MCNC: <0.2 MG/DL (ref 0–1.2)
BUN SERPL-MCNC: 14 MG/DL (ref 6–24)
BUN/CREAT SERPL: 12 (ref 9–23)
CALCIUM SERPL-MCNC: 9.6 MG/DL (ref 8.7–10.2)
CHLORIDE SERPL-SCNC: 106 MMOL/L (ref 96–106)
CHOLEST SERPL-MCNC: 201 MG/DL (ref 100–199)
CO2 SERPL-SCNC: 22 MMOL/L (ref 20–29)
CREAT SERPL-MCNC: 1.13 MG/DL (ref 0.57–1)
EGFR: 63 ML/MIN/1.73
ERYTHROCYTE [DISTWIDTH] IN BLOOD BY AUTOMATED COUNT: 13.8 % (ref 11.7–15.4)
EST. AVERAGE GLUCOSE BLD GHB EST-MCNC: 123 MG/DL
GLOBULIN SER CALC-MCNC: 2.3 G/DL (ref 1.5–4.5)
GLUCOSE SERPL-MCNC: 89 MG/DL (ref 70–99)
HBA1C MFR BLD: 5.9 % (ref 4.8–5.6)
HCT VFR BLD AUTO: 39.9 % (ref 34–46.6)
HDLC SERPL-MCNC: 66 MG/DL
HGB BLD-MCNC: 13.1 G/DL (ref 11.1–15.9)
LDLC SERPL CALC-MCNC: 124 MG/DL (ref 0–99)
MCH RBC QN AUTO: 30.7 PG (ref 26.6–33)
MCHC RBC AUTO-ENTMCNC: 32.8 G/DL (ref 31.5–35.7)
MCV RBC AUTO: 93 FL (ref 79–97)
PLATELET # BLD AUTO: 274 X10E3/UL (ref 150–450)
POTASSIUM SERPL-SCNC: 4.4 MMOL/L (ref 3.5–5.2)
PROT SERPL-MCNC: 6.8 G/DL (ref 6–8.5)
RBC # BLD AUTO: 4.27 X10E6/UL (ref 3.77–5.28)
SODIUM SERPL-SCNC: 145 MMOL/L (ref 134–144)
TRIGL SERPL-MCNC: 61 MG/DL (ref 0–149)
VLDLC SERPL CALC-MCNC: 11 MG/DL (ref 5–40)
WBC # BLD AUTO: 6.3 X10E3/UL (ref 3.4–10.8)

## 2023-02-07 ENCOUNTER — OFFICE VISIT (OUTPATIENT)
Dept: FAMILY MEDICINE CLINIC | Age: 43
End: 2023-02-07
Payer: MEDICARE

## 2023-02-07 ENCOUNTER — TELEPHONE (OUTPATIENT)
Dept: FAMILY MEDICINE CLINIC | Age: 43
End: 2023-02-07

## 2023-02-07 VITALS
TEMPERATURE: 98.5 F | OXYGEN SATURATION: 100 % | SYSTOLIC BLOOD PRESSURE: 159 MMHG | HEART RATE: 70 BPM | RESPIRATION RATE: 20 BRPM | DIASTOLIC BLOOD PRESSURE: 93 MMHG | WEIGHT: 231 LBS | BODY MASS INDEX: 38.44 KG/M2

## 2023-02-07 DIAGNOSIS — F32.A DEPRESSION, UNSPECIFIED DEPRESSION TYPE: ICD-10-CM

## 2023-02-07 DIAGNOSIS — M79.2 NEUROGENIC PAIN DUE TO CENTRAL NERVOUS SYSTEM ABNORMALITY FOLLOWING STROKE: ICD-10-CM

## 2023-02-07 DIAGNOSIS — I10 ESSENTIAL HYPERTENSION: Primary | ICD-10-CM

## 2023-02-07 DIAGNOSIS — I69.398 NEUROGENIC PAIN DUE TO CENTRAL NERVOUS SYSTEM ABNORMALITY FOLLOWING STROKE: ICD-10-CM

## 2023-02-07 RX ORDER — HYDROCHLOROTHIAZIDE 25 MG/1
25 TABLET ORAL DAILY
Qty: 90 TABLET | Refills: 3 | Status: SHIPPED | OUTPATIENT
Start: 2023-02-07 | End: 2023-02-07

## 2023-02-07 RX ORDER — LISINOPRIL 40 MG/1
40 TABLET ORAL DAILY
Qty: 90 TABLET | Refills: 2 | Status: SHIPPED | OUTPATIENT
Start: 2023-02-07

## 2023-02-07 RX ORDER — DULOXETIN HYDROCHLORIDE 30 MG/1
30 CAPSULE, DELAYED RELEASE ORAL DAILY
Qty: 30 CAPSULE | Refills: 0 | Status: SHIPPED | OUTPATIENT
Start: 2023-02-07

## 2023-02-07 RX ORDER — HYDROCHLOROTHIAZIDE 50 MG/1
50 TABLET ORAL DAILY
Qty: 90 TABLET | Refills: 2 | Status: SHIPPED | OUTPATIENT
Start: 2023-02-07

## 2023-02-07 RX ORDER — HYDROCHLOROTHIAZIDE 25 MG/1
50 TABLET ORAL DAILY
Qty: 90 TABLET | Refills: 3 | Status: SHIPPED | OUTPATIENT
Start: 2023-02-07 | End: 2023-02-07

## 2023-02-07 RX ORDER — ATENOLOL 50 MG/1
50 TABLET ORAL DAILY
Qty: 90 TABLET | Refills: 2 | Status: SHIPPED | OUTPATIENT
Start: 2023-02-07 | End: 2023-02-07

## 2023-02-07 RX ORDER — ATENOLOL 50 MG/1
100 TABLET ORAL DAILY
Qty: 90 TABLET | Refills: 2 | Status: SHIPPED | OUTPATIENT
Start: 2023-02-07

## 2023-02-07 NOTE — PROGRESS NOTES
Kena Sylvester is an 40 y.o. female who presents for chronic condition followup. Depression: Not on any meds currently. Desires medical management. No suicidal or homicidal ideation or intent. HTN:  BP log notable for blood pressures ranging: No log  Current exercise regimen:  none  Current medication regimen: atenolol, lisinopril, hctz - noncompliant   Medication compliance: ran out of meds. Has been noncompliant     Current diet (check salt intake): doesn't watch salt intake  Smoking history: past smoker  Drugs: marijuana  Alcohol : rare     Pt denies any headache, visual change, chest pain on exertion,dyspnea on exertion, or swelling. Neurogenic pain: Suffers from pain on the left side of her body up and down. Has tried conservative meds with no success    Allergies - reviewed: Allergies   Allergen Reactions    Amoxicillin Hives     On arms     Carvedilol Hives    Tramadol Nausea Only     Headaches  Patient states not allergic         Medications - reviewed:   Current Outpatient Medications   Medication Sig    lisinopriL (PRINIVIL, ZESTRIL) 40 mg tablet Take 1 Tablet by mouth daily. DULoxetine (CYMBALTA) 30 mg capsule Take 1 Capsule by mouth daily. atenoloL (TENORMIN) 50 mg tablet Take 2 Tablets by mouth daily. hydroCHLOROthiazide (HYDRODIURIL) 50 mg tablet Take 1 Tablet by mouth daily. omeprazole (PriLOSEC OTC) 20 mg tablet Take 1 Tablet by mouth daily. triamcinolone acetonide (KENALOG) 0.1 % topical cream Apply  to affected area two (2) times daily as needed for Skin Irritation. fluticasone propionate (FLONASE) 50 mcg/actuation nasal spray 2 Sprays by Both Nostrils route daily. diclofenac (VOLTAREN) 1 % gel Apply 4 g to affected area every six (6) hours as needed for Pain.     cyclobenzaprine (FLEXERIL) 10 mg tablet TAKE ONE TABLET BY MOUTH 3 TIMES DAILY AS NEEDED FOR MUSCLE SPASM(S)    cetirizine (ZYRTEC) 1 mg/mL solution Take 10 mL by mouth daily as needed for Allergies (nightly as needed). atorvastatin (LIPITOR) 40 mg tablet Take 1 Tablet by mouth daily. aspirin 81 mg chewable tablet CHEW AND SWALLOW ONE TABLET BY MOUTH EVERY DAY    ammonium lactate (AMLACTIN) 12 % topical cream  (Patient not taking: Reported on 1/24/2023)     No current facility-administered medications for this visit.          Past Medical History - reviewed:  Past Medical History:   Diagnosis Date    Depression     GERD (gastroesophageal reflux disease)     Headache     Hypertension     Neuropathic pain of forearm, left     Neuropathic pain of left lower extremity     Stroke [facility]) 11/2017    2017    TMJ disease     per patient by ER diagnosis         Past Surgical History - reviewed:   Past Surgical History:   Procedure Laterality Date    HX DILATION AND CURETTAGE      D & C    [facility] UNLISTED PROCEDURE CARDIAC SURGERY  2018    implantable loop recorder for A fib         Social History - reviewed:  Social History     Socioeconomic History    Marital status: SINGLE     Spouse name: Not on file    Number of children: Not on file    Years of education: Not on file    Highest education level: Not on file   Occupational History    Not on file   Tobacco Use    Smoking status: Former     Packs/day: 1.80     Years: 10.00     Pack years: 18.00     Types: Cigarettes    Smokeless tobacco: Never   Substance and Sexual Activity    Alcohol use: No    Drug use: Yes     Types: Marijuana    Sexual activity: Yes     Partners: Male     Birth control/protection: Surgical   Other Topics Concern    Not on file   Social History Narrative    Not on file     Social Determinants of Health     Financial Resource Strain: Not on file   Food Insecurity: Not on file   Transportation Needs: Not on file   Physical Activity: Not on file   Stress: Not on file   Social Connections: Not on file   Intimate Partner Violence: Not on file   Housing Stability: Not on file         Family History - reviewed:  Family History   Problem Relation Age of Onset    Schizophrenia Mother     Depression Mother     Anxiety Mother     Asthma Mother     Crohn's Disease Mother     Stroke Father     Hypertension Father     No Known Problems Sister     No Known Problems Brother     Other Son         pyloric stenosis    Cancer Maternal Grandfather         Lung    Other Paternal Grandfather         Aneursym    Colon Cancer Maternal Uncle     Cancer Maternal Uncle         Brain cancer         Immunizations - reviewed:   Immunization History   Administered Date(s) Administered    Influenza, FLUARIX, FLULAVAL, FLUZONE (age 10 mo+) AND AFLURIA, (age 1 y+), PF, 0.5mL 10/25/2018       ROS  In HPI      Physical Exam  Visit Vitals  BP (!) 159/93   Pulse 70   Temp 98.5 °F (36.9 °C)   Resp 20   Wt 231 lb (104.8 kg)   LMP 01/23/2023   SpO2 100%   BMI 38.44 kg/m²       General appearance - alert, well appearing, and in no distress  Eyes - pupils equal and reactive, extraocular eye movements intact  Chest - clear to auscultation, no wheezes, rales or rhonchi, symmetric air entry  Heart - normal rate, regular rhythm, normal S1, S2, no murmurs, rubs, clicks or gallops  Abdomen - soft, nontender, nondistended, no masses or organomegaly  Neurological - alert, oriented, normal speech, no focal findings or movement disorder noted  Extremities - peripheral pulses normal, no pedal edema, no clubbing or cyanosis  Skin - normal coloration and turgor, no rashes, no suspicious skin lesions noted      Assessment/Plan    ICD-10-CM ICD-9-CM    1. Essential hypertension  I10 401.9 lisinopriL (PRINIVIL, ZESTRIL) 40 mg tablet      atenoloL (TENORMIN) 50 mg tablet      hydroCHLOROthiazide (HYDRODIURIL) 50 mg tablet      DISCONTINUED: atenoloL (TENORMIN) 50 mg tablet      DISCONTINUED: hydroCHLOROthiazide (HYDRODIURIL) 25 mg tablet      DISCONTINUED: hydroCHLOROthiazide (HYDRODIURIL) 25 mg tablet      2.  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 Depression, unspecified depression type  F32. A 311 DULoxetine (CYMBALTA) 30 mg capsule        HTN: Current BP is above goal.  My recommendations include;  - Keep BP log. Can check BP at least 3 times per week  - Limit alcohol intake to less than 2 drinks daily   - Encouraged to avoid tobacco products  - Avoid excess caffeine, energy drinks, NSAIDs, decongestants (such as Sudafed, Pseudoephedrine, phenylephrine, and Afrin) and stimulants   - Focus on regular exercise (150 minutes each week) and healthy eating. Eat more fruits, vegetables, protein (egg whites, beans, and nuts you know you tolerate) and less carbohydrates (white bread, white rice, white pasta, white potatoes, sodas, and sweets). Eat appropriately small portion sizes. - Restart meds  - RTC in 1 week - will get labs    Chronic pain/depression: No SI/SI/HI/HI. Cymbalta should help with both. - Cymbalta 30 QD    Follow-up and Dispositions    Return in about 1 week (around 2/14/2023) for HTN follow up. I have discussed the diagnosis with the patient and the intended plan as seen in the above orders. Patient verbalized understanding of the plan and agrees with the plan. The patient has received an after-visit summary and questions were answered concerning future plans. I have discussed medication side effects and warnings with the patient as well. Informed patient to return to the office if new symptoms arise.         Mitra Zayas MD  Family Medicine Resident

## 2023-02-07 NOTE — PROGRESS NOTES
1. Have you been to the ER, urgent care clinic since your last visit? Hospitalized since your last visit? No    2. Have you seen or consulted any other health care providers outside of the [address] [place] [address] since your last visit? Include any pap smears or colon screening. No  Reviewed record in preparation for visit and have necessary documentation  Pt did not bring medication to office visit for review  opportunity was given for questions      3. For patients aged 39-70: Has the patient had a colonoscopy / FIT/ Cologuard? Yes - no Care Gap present      If the patient is female:    4. For patients aged 41-77: Has the patient had a mammogram within the past 2 years? Yes - no Care Gap present      5. For patients aged 21-65: Has the patient had a pap smear?  Yes - no Care Gap present      Goals that were addressed and/or need to be completed during or after this appointment include   Health Maintenance Due   Topic Date Due    COVID-19 Vaccine (1) Never done    DTaP/Tdap/Td series (1 - Tdap) Never done    Flu Vaccine (1) 08/01/2022

## 2023-02-07 NOTE — PATIENT INSTRUCTIONS
- Keep BP log. Can check BP at least 3 times per week  - Limit alcohol intake to less than 2 drinks daily   - Encouraged to avoid tobacco products  - Avoid excess caffeine, energy drinks, NSAIDs, decongestants (such as Sudafed, Pseudoephedrine, phenylephrine, and Afrin) and stimulants   - Focus on regular exercise (150 minutes each week) and healthy eating. Eat more fruits, vegetables, protein (egg whites, beans, and nuts you know you tolerate) and less carbohydrates (white bread, white rice, white pasta, white potatoes, sodas, and sweets). Eat appropriately small portion sizes.

## 2023-02-25 DIAGNOSIS — I63.9 CEREBROVASCULAR ACCIDENT (CVA), UNSPECIFIED MECHANISM (HCC): ICD-10-CM

## 2023-02-25 RX ORDER — ATORVASTATIN CALCIUM 40 MG/1
80 TABLET, FILM COATED ORAL DAILY
Qty: 90 TABLET | Refills: 0 | Status: SHIPPED | OUTPATIENT
Start: 2023-02-25

## 2023-05-05 ENCOUNTER — NURSE TRIAGE (OUTPATIENT)
Dept: OTHER | Facility: CLINIC | Age: 43
End: 2023-05-05

## 2023-05-05 ENCOUNTER — TELEPHONE (OUTPATIENT)
Dept: FAMILY MEDICINE CLINIC | Age: 43
End: 2023-05-05

## 2023-05-05 DIAGNOSIS — I63.9 CEREBROVASCULAR ACCIDENT (CVA), UNSPECIFIED MECHANISM (HCC): ICD-10-CM

## 2023-05-05 DIAGNOSIS — I10 ESSENTIAL HYPERTENSION: ICD-10-CM

## 2023-05-05 RX ORDER — ATORVASTATIN CALCIUM 80 MG/1
80 TABLET, FILM COATED ORAL DAILY
Qty: 90 TABLET | Refills: 3 | Status: SHIPPED | OUTPATIENT
Start: 2023-05-05

## 2023-05-05 RX ORDER — ATENOLOL 50 MG/1
50 TABLET ORAL DAILY
Qty: 90 TABLET | Refills: 2 | Status: SHIPPED | OUTPATIENT
Start: 2023-05-05

## 2024-01-04 ENCOUNTER — OFFICE VISIT (OUTPATIENT)
Facility: CLINIC | Age: 44
End: 2024-01-04
Payer: MEDICAID

## 2024-01-04 VITALS
SYSTOLIC BLOOD PRESSURE: 151 MMHG | BODY MASS INDEX: 39.39 KG/M2 | RESPIRATION RATE: 18 BRPM | DIASTOLIC BLOOD PRESSURE: 86 MMHG | OXYGEN SATURATION: 100 % | HEART RATE: 82 BPM | HEIGHT: 65 IN | WEIGHT: 236.4 LBS | TEMPERATURE: 98.5 F

## 2024-01-04 DIAGNOSIS — F32.2 CURRENT SEVERE EPISODE OF MAJOR DEPRESSIVE DISORDER WITHOUT PSYCHOTIC FEATURES WITHOUT PRIOR EPISODE (HCC): ICD-10-CM

## 2024-01-04 DIAGNOSIS — I63.312 CEREBROVASCULAR ACCIDENT (CVA) DUE TO THROMBOSIS OF LEFT MIDDLE CEREBRAL ARTERY (HCC): ICD-10-CM

## 2024-01-04 DIAGNOSIS — F41.9 ANXIETY: ICD-10-CM

## 2024-01-04 DIAGNOSIS — M62.838 MUSCLE SPASM: ICD-10-CM

## 2024-01-04 DIAGNOSIS — K21.9 GASTROESOPHAGEAL REFLUX DISEASE WITHOUT ESOPHAGITIS: ICD-10-CM

## 2024-01-04 DIAGNOSIS — I10 PRIMARY HYPERTENSION: Primary | ICD-10-CM

## 2024-01-04 DIAGNOSIS — R21 SKIN RASH: ICD-10-CM

## 2024-01-04 DIAGNOSIS — L30.4 CHAFING: ICD-10-CM

## 2024-01-04 DIAGNOSIS — R79.89 ELEVATED SERUM CREATININE: ICD-10-CM

## 2024-01-04 DIAGNOSIS — E78.2 MIXED HYPERLIPIDEMIA: ICD-10-CM

## 2024-01-04 DIAGNOSIS — M79.2 NEUROPATHIC PAIN: ICD-10-CM

## 2024-01-04 DIAGNOSIS — R09.81 NASAL CONGESTION: ICD-10-CM

## 2024-01-04 PROCEDURE — 3079F DIAST BP 80-89 MM HG: CPT

## 2024-01-04 PROCEDURE — 99214 OFFICE O/P EST MOD 30 MIN: CPT

## 2024-01-04 PROCEDURE — 3077F SYST BP >= 140 MM HG: CPT

## 2024-01-04 RX ORDER — FLUTICASONE PROPIONATE 50 MCG
2 SPRAY, SUSPENSION (ML) NASAL DAILY
Qty: 16 G | Refills: 1 | Status: SHIPPED | OUTPATIENT
Start: 2024-01-04

## 2024-01-04 RX ORDER — ATORVASTATIN CALCIUM 40 MG/1
40 TABLET, FILM COATED ORAL DAILY
Qty: 90 TABLET | Refills: 1 | Status: SHIPPED | OUTPATIENT
Start: 2024-01-04

## 2024-01-04 RX ORDER — ACETAMINOPHEN 500 MG
1000 TABLET ORAL EVERY 6 HOURS PRN
Qty: 180 TABLET | Refills: 3 | Status: SHIPPED | OUTPATIENT
Start: 2024-01-04

## 2024-01-04 RX ORDER — ASPIRIN 81 MG/1
TABLET, CHEWABLE ORAL
Qty: 180 TABLET | Refills: 1 | Status: SHIPPED | OUTPATIENT
Start: 2024-01-04

## 2024-01-04 RX ORDER — ATENOLOL 50 MG/1
50 TABLET ORAL DAILY
Qty: 30 TABLET | Refills: 0 | Status: SHIPPED | OUTPATIENT
Start: 2024-01-04

## 2024-01-04 RX ORDER — OMEPRAZOLE 20 MG/1
40 TABLET, DELAYED RELEASE ORAL DAILY
Qty: 90 TABLET | Refills: 1 | Status: SHIPPED | OUTPATIENT
Start: 2024-01-04

## 2024-01-04 RX ORDER — LISINOPRIL 40 MG/1
40 TABLET ORAL DAILY
Qty: 30 TABLET | Refills: 0 | Status: SHIPPED | OUTPATIENT
Start: 2024-01-04

## 2024-01-04 RX ORDER — AMMONIUM LACTATE 12 G/100G
CREAM TOPICAL
Qty: 385 G | Refills: 0 | Status: SHIPPED | OUTPATIENT
Start: 2024-01-04

## 2024-01-04 RX ORDER — HYDROCHLOROTHIAZIDE 50 MG/1
50 TABLET ORAL DAILY
Qty: 30 TABLET | Refills: 0 | Status: SHIPPED | OUTPATIENT
Start: 2024-01-04

## 2024-01-04 RX ORDER — DULOXETIN HYDROCHLORIDE 30 MG/1
30 CAPSULE, DELAYED RELEASE ORAL DAILY
Qty: 90 CAPSULE | Refills: 0 | Status: SHIPPED | OUTPATIENT
Start: 2024-01-04

## 2024-01-04 RX ORDER — CYCLOBENZAPRINE HCL 10 MG
TABLET ORAL
Qty: 180 TABLET | Refills: 1 | Status: SHIPPED | OUTPATIENT
Start: 2024-01-04

## 2024-01-04 RX ORDER — TRIAMCINOLONE ACETONIDE 1 MG/G
CREAM TOPICAL 2 TIMES DAILY PRN
Qty: 15 G | Refills: 0 | Status: SHIPPED | OUTPATIENT
Start: 2024-01-04

## 2024-01-04 SDOH — ECONOMIC STABILITY: FOOD INSECURITY: WITHIN THE PAST 12 MONTHS, THE FOOD YOU BOUGHT JUST DIDN'T LAST AND YOU DIDN'T HAVE MONEY TO GET MORE.: NEVER TRUE

## 2024-01-04 SDOH — ECONOMIC STABILITY: HOUSING INSECURITY
IN THE LAST 12 MONTHS, WAS THERE A TIME WHEN YOU DID NOT HAVE A STEADY PLACE TO SLEEP OR SLEPT IN A SHELTER (INCLUDING NOW)?: NO

## 2024-01-04 SDOH — ECONOMIC STABILITY: INCOME INSECURITY: HOW HARD IS IT FOR YOU TO PAY FOR THE VERY BASICS LIKE FOOD, HOUSING, MEDICAL CARE, AND HEATING?: SOMEWHAT HARD

## 2024-01-04 SDOH — ECONOMIC STABILITY: FOOD INSECURITY: WITHIN THE PAST 12 MONTHS, YOU WORRIED THAT YOUR FOOD WOULD RUN OUT BEFORE YOU GOT MONEY TO BUY MORE.: NEVER TRUE

## 2024-01-04 ASSESSMENT — PATIENT HEALTH QUESTIONNAIRE - PHQ9
3. TROUBLE FALLING OR STAYING ASLEEP: 3
SUM OF ALL RESPONSES TO PHQ QUESTIONS 1-9: 22
5. POOR APPETITE OR OVEREATING: 2
6. FEELING BAD ABOUT YOURSELF - OR THAT YOU ARE A FAILURE OR HAVE LET YOURSELF OR YOUR FAMILY DOWN: 3
SUM OF ALL RESPONSES TO PHQ QUESTIONS 1-9: 22
4. FEELING TIRED OR HAVING LITTLE ENERGY: 2
8. MOVING OR SPEAKING SO SLOWLY THAT OTHER PEOPLE COULD HAVE NOTICED. OR THE OPPOSITE, BEING SO FIGETY OR RESTLESS THAT YOU HAVE BEEN MOVING AROUND A LOT MORE THAN USUAL: 3
10. IF YOU CHECKED OFF ANY PROBLEMS, HOW DIFFICULT HAVE THESE PROBLEMS MADE IT FOR YOU TO DO YOUR WORK, TAKE CARE OF THINGS AT HOME, OR GET ALONG WITH OTHER PEOPLE: 3
1. LITTLE INTEREST OR PLEASURE IN DOING THINGS: 3
7. TROUBLE CONCENTRATING ON THINGS, SUCH AS READING THE NEWSPAPER OR WATCHING TELEVISION: 3
9. THOUGHTS THAT YOU WOULD BE BETTER OFF DEAD, OR OF HURTING YOURSELF: 0
2. FEELING DOWN, DEPRESSED OR HOPELESS: 3
SUM OF ALL RESPONSES TO PHQ9 QUESTIONS 1 & 2: 6
SUM OF ALL RESPONSES TO PHQ QUESTIONS 1-9: 22
SUM OF ALL RESPONSES TO PHQ QUESTIONS 1-9: 22

## 2024-01-04 ASSESSMENT — ANXIETY QUESTIONNAIRES
2. NOT BEING ABLE TO STOP OR CONTROL WORRYING: 3
3. WORRYING TOO MUCH ABOUT DIFFERENT THINGS: 3
5. BEING SO RESTLESS THAT IT IS HARD TO SIT STILL: 3
4. TROUBLE RELAXING: 3
6. BECOMING EASILY ANNOYED OR IRRITABLE: 3
GAD7 TOTAL SCORE: 19
1. FEELING NERVOUS, ANXIOUS, OR ON EDGE: 3
7. FEELING AFRAID AS IF SOMETHING AWFUL MIGHT HAPPEN: 1
IF YOU CHECKED OFF ANY PROBLEMS ON THIS QUESTIONNAIRE, HOW DIFFICULT HAVE THESE PROBLEMS MADE IT FOR YOU TO DO YOUR WORK, TAKE CARE OF THINGS AT HOME, OR GET ALONG WITH OTHER PEOPLE: VERY DIFFICULT

## 2024-01-04 ASSESSMENT — COLUMBIA-SUICIDE SEVERITY RATING SCALE - C-SSRS
1. WITHIN THE PAST MONTH, HAVE YOU WISHED YOU WERE DEAD OR WISHED YOU COULD GO TO SLEEP AND NOT WAKE UP?: NO
6. HAVE YOU EVER DONE ANYTHING, STARTED TO DO ANYTHING, OR PREPARED TO DO ANYTHING TO END YOUR LIFE?: NO
2. HAVE YOU ACTUALLY HAD ANY THOUGHTS OF KILLING YOURSELF?: NO

## 2024-01-04 NOTE — PROGRESS NOTES
Chief Complaint   Patient presents with    Follow-up Chronic Condition     Med refills       \"Have you been to the ER, urgent care clinic since your last visit?  Hospitalized since your last visit?\"    NO    “Have you seen or consulted any other health care providers outside of [facility] since your last visit?”    NO        “Have you had a pap smear?”    NO           Health Maintenance Due   Topic Date Due    Hepatitis B vaccine (1 of 3 - 3-dose series) Never done    COVID-19 Vaccine (1) Never done    Varicella vaccine (1 of 2 - 2-dose childhood series) Never done    Hepatitis C screen  Never done    DTaP/Tdap/Td vaccine (1 - Tdap) Never done    Cervical cancer screen  03/23/2023    Flu vaccine (1) 08/01/2023    A1C test (Diabetic or Prediabetic)  01/24/2024    Lipids  01/24/2024    Depression Monitoring  01/24/2024        
ceived the following from [facility]: Outside name: ammonium lactate (AMLACTIN) 12 % topical cream  -     triamcinolone (KENALOG) 0.1 % cream; Apply topically 2 times daily as needed (rash)    Nasal congestion  Refills provided.    -     fluticasone (FLONASE) 50 MCG/ACT nasal spray; 2 sprays by Nasal route daily      Return in about 1 week (around 1/11/2024) for Blood pressure check. Please schedule patient for a longer time slot. .      Annette Reese expressed understanding of this plan. An AVS was printed and given to the patient.     Seen and discussed with attending.    Alessandra Greenberg MD  Family Medicine PGY-2

## 2024-02-29 DIAGNOSIS — I10 PRIMARY HYPERTENSION: ICD-10-CM

## 2024-02-29 NOTE — TELEPHONE ENCOUNTER
atenolol (TENORMIN) 50 MG tablet   lisinopril (PRINIVIL;ZESTRIL) 40 MG tablet   hydroCHLOROthiazide (HYDRODIURIL) 50 MG tablet     Please send refills to [facility].

## 2024-03-01 RX ORDER — HYDROCHLOROTHIAZIDE 50 MG/1
50 TABLET ORAL DAILY
Qty: 90 TABLET | Refills: 1 | Status: SHIPPED | OUTPATIENT
Start: 2024-03-01

## 2024-03-01 RX ORDER — LISINOPRIL 40 MG/1
40 TABLET ORAL DAILY
Qty: 90 TABLET | Refills: 1 | Status: SHIPPED | OUTPATIENT
Start: 2024-03-01

## 2024-03-01 RX ORDER — ATENOLOL 50 MG/1
50 TABLET ORAL DAILY
Qty: 90 TABLET | Refills: 1 | Status: SHIPPED | OUTPATIENT
Start: 2024-03-01

## 2024-03-05 ENCOUNTER — OFFICE VISIT (OUTPATIENT)
Facility: CLINIC | Age: 44
End: 2024-03-05

## 2024-03-05 VITALS
RESPIRATION RATE: 20 BRPM | DIASTOLIC BLOOD PRESSURE: 80 MMHG | TEMPERATURE: 98.6 F | BODY MASS INDEX: 39.65 KG/M2 | OXYGEN SATURATION: 97 % | SYSTOLIC BLOOD PRESSURE: 171 MMHG | HEIGHT: 65 IN | WEIGHT: 238 LBS | HEART RATE: 69 BPM

## 2024-03-05 DIAGNOSIS — F32.2 CURRENT SEVERE EPISODE OF MAJOR DEPRESSIVE DISORDER WITHOUT PSYCHOTIC FEATURES WITHOUT PRIOR EPISODE (HCC): ICD-10-CM

## 2024-03-05 DIAGNOSIS — I63.312 CEREBROVASCULAR ACCIDENT (CVA) DUE TO THROMBOSIS OF LEFT MIDDLE CEREBRAL ARTERY (HCC): ICD-10-CM

## 2024-03-05 DIAGNOSIS — Z91.148 NONCOMPLIANCE WITH MEDICATION REGIMEN: ICD-10-CM

## 2024-03-05 DIAGNOSIS — E55.9 VITAMIN D DEFICIENCY: ICD-10-CM

## 2024-03-05 DIAGNOSIS — I10 PRIMARY HYPERTENSION: Primary | ICD-10-CM

## 2024-03-05 DIAGNOSIS — E78.2 MIXED HYPERLIPIDEMIA: ICD-10-CM

## 2024-03-05 DIAGNOSIS — Z11.59 ENCOUNTER FOR HEPATITIS C SCREENING TEST FOR LOW RISK PATIENT: ICD-10-CM

## 2024-03-05 DIAGNOSIS — M79.2 NEUROPATHIC PAIN: ICD-10-CM

## 2024-03-05 DIAGNOSIS — E66.01 CLASS 2 SEVERE OBESITY DUE TO EXCESS CALORIES WITH SERIOUS COMORBIDITY AND BODY MASS INDEX (BMI) OF 39.0 TO 39.9 IN ADULT (HCC): ICD-10-CM

## 2024-03-05 DIAGNOSIS — R73.03 PREDIABETES: ICD-10-CM

## 2024-03-05 PROBLEM — E66.812 CLASS 2 SEVERE OBESITY DUE TO EXCESS CALORIES WITH SERIOUS COMORBIDITY AND BODY MASS INDEX (BMI) OF 39.0 TO 39.9 IN ADULT: Status: ACTIVE | Noted: 2024-03-05

## 2024-03-05 RX ORDER — ACETAMINOPHEN 500 MG
1000 TABLET ORAL EVERY 6 HOURS PRN
Qty: 180 TABLET | Refills: 3 | Status: SHIPPED | OUTPATIENT
Start: 2024-03-05

## 2024-03-05 NOTE — PROGRESS NOTES
1. Have you been to the ER, urgent care clinic since your last visit?  Hospitalized since your last visit?no  2. Have you seen or consulted any other health care providers outside of the [facility] since your last visit?  Include any pap smears or colon screening.no  Reviewed record in preparation for visit and have necessary documentation  Pt did not bring medication to office visit for review  opportunity was given for questions  Goals that were addressed and/or need to be completed during or after this appointment include   Health Maintenance Due   Topic Date Due    Hepatitis B vaccine (1 of 3 - 3-dose series) Never done    COVID-19 Vaccine (1) Never done    Varicella vaccine (1 of 2 - 2-dose childhood series) Never done    Hepatitis C screen  Never done    DTaP/Tdap/Td vaccine (1 - Tdap) Never done    Cervical cancer screen  03/23/2023    A1C test (Diabetic or Prediabetic)  01/24/2024    Lipids  01/24/2024

## 2024-03-05 NOTE — PROGRESS NOTES
[facility]      Chief Complaint:     Chief Complaint   Patient presents with    Blood Pressure Check    Hypertension    Joint Swelling       Annette Reese is a 44 y.o. female that presents for: Follow up       Assessment/Plan:   I personally reviewed the following Pertinent Labs/Studies:   - Encounter notes, labs, and imaging from 1/4/24.    Pt is a 44 year old female with complex PMH including left sided CVA and uncontrolled HTN due to medication noncompliance presenting for follow up.    Annette was seen today for blood pressure check, hypertension and joint swelling.    Diagnoses and all orders for this visit:    Primary hypertension: uncontrolled at 171/80 today. Pt reports being out of medicine for last week. Has documented prior history of medication noncompliance. Discussed importance of need for tight BP control given h/o stroke to reduce risk of recurrence. Advised to return in 1 week with log. If still above goal despite taking meds consider addition of CCB.   -     Comprehensive Metabolic Panel; Future  -     Comprehensive Metabolic Panel    Current severe episode of major depressive disorder without psychotic features without prior episode (HCC): noncompliant with Cymbalta. Advised to start taking again and discussed risks of starting and stopping without tapering. Will also check a Vit D level.   -     Vitamin D 25 Hydroxy; Future  -     Vitamin D 25 Hydroxy    Prediabetes: A1c 5.9 over a year ago. Due for recheck.   -     Hemoglobin A1C; Future  -     Lipid Panel; Future  -     Comprehensive Metabolic Panel; Future  -     Comprehensive Metabolic Panel  -     Lipid Panel  -     Hemoglobin A1C    Mixed hyperlipidemia: last LDL significantly above goal at 124. Given h/o stroke should target an LDL of <55 for secondary prevention (or at most, 70). Recheck lipid panel today, will likely need to max out statin intensity. Consider changing Lipitor 40 to Crestor 40.   -     Lipid Panel;

## 2024-03-05 NOTE — PATIENT INSTRUCTIONS
3/13/2024  9:00 AM      Providers  Christopher Melton MD  Neurology  NPI: [id number]  [address] [place] [id number]  Phone: [phone]  Fax: [phone]  [facility]  Affiliated with [address]  [address] [phone]    Monitor blood pressure outside the office several times weekly at different times during the day and evening.   Blood Pressure Record     Patient Name:  _____Dialeta CHAYO Reese_________________ :  ________1980________    Date/Time BP Reading Pulse

## 2024-03-06 LAB
25(OH)D3 SERPL-MCNC: <9 NG/ML (ref 30–100)
ALBUMIN SERPL-MCNC: 3.6 G/DL (ref 3.5–5)
ALBUMIN/GLOB SERPL: 1.1 (ref 1.1–2.2)
ALP SERPL-CCNC: 69 U/L (ref 45–117)
ALT SERPL-CCNC: 19 U/L (ref 12–78)
ANION GAP SERPL CALC-SCNC: 5 MMOL/L (ref 5–15)
AST SERPL-CCNC: 20 U/L (ref 15–37)
BILIRUB SERPL-MCNC: 0.2 MG/DL (ref 0.2–1)
BUN SERPL-MCNC: 10 MG/DL (ref 6–20)
BUN/CREAT SERPL: 10 (ref 12–20)
CALCIUM SERPL-MCNC: 9.1 MG/DL (ref 8.5–10.1)
CHLORIDE SERPL-SCNC: 108 MMOL/L (ref 97–108)
CHOLEST SERPL-MCNC: 156 MG/DL
CO2 SERPL-SCNC: 26 MMOL/L (ref 21–32)
CREAT SERPL-MCNC: 1.02 MG/DL (ref 0.55–1.02)
EST. AVERAGE GLUCOSE BLD GHB EST-MCNC: 117 MG/DL
GLOBULIN SER CALC-MCNC: 3.2 G/DL (ref 2–4)
GLUCOSE SERPL-MCNC: 95 MG/DL (ref 65–100)
HBA1C MFR BLD: 5.7 % (ref 4–5.6)
HCV AB SER IA-ACNC: 0.06 INDEX
HCV AB SERPL QL IA: NONREACTIVE
HDLC SERPL-MCNC: 78 MG/DL
HDLC SERPL: 2 (ref 0–5)
LDLC SERPL CALC-MCNC: 68.6 MG/DL (ref 0–100)
POTASSIUM SERPL-SCNC: 4.1 MMOL/L (ref 3.5–5.1)
PROT SERPL-MCNC: 6.8 G/DL (ref 6.4–8.2)
SODIUM SERPL-SCNC: 139 MMOL/L (ref 136–145)
TRIGL SERPL-MCNC: 47 MG/DL
VLDLC SERPL CALC-MCNC: 9.4 MG/DL

## 2024-03-07 RX ORDER — ERGOCALCIFEROL 1.25 MG/1
50000 CAPSULE ORAL WEEKLY
Qty: 12 CAPSULE | Refills: 1 | Status: SHIPPED | OUTPATIENT
Start: 2024-03-07

## 2024-03-07 NOTE — RESULT ENCOUNTER NOTE
Profound vitamin D deficiency. Will start on weekly ergocalciferol and recheck in three months. CMP wnl. A1c still prediabetic range. HCV NR. Lipid panel with TG and HDL at goal. Based on [place] guidelines for secondary stroke prevention LDL is sufficiently low at 68 (<70) so not totally necessary to intensify statin therapy however would still consider targeting a goal of <55 for potentially greater benefit in secondary risk reduction. Can defer this decision to neurology versus intensifying therapy after shared decision making with patient.     Called patient to discuss results but got no answer. Left VM. Will also mail letter.

## 2024-03-08 NOTE — PROGRESS NOTES
I saw and evaluated the patient, performing the key elements of the service.  I discussed the findings, assessment and plan with the resident and agree with the resident's findings and plan as documented in the resident's note.

## 2024-03-11 DIAGNOSIS — I10 PRIMARY HYPERTENSION: ICD-10-CM

## 2024-03-11 DIAGNOSIS — M79.2 NEUROPATHIC PAIN: ICD-10-CM

## 2024-03-11 DIAGNOSIS — I63.312 CEREBROVASCULAR ACCIDENT (CVA) DUE TO THROMBOSIS OF LEFT MIDDLE CEREBRAL ARTERY (HCC): ICD-10-CM

## 2024-03-11 DIAGNOSIS — L30.4 CHAFING: ICD-10-CM

## 2024-03-11 DIAGNOSIS — K21.9 GASTROESOPHAGEAL REFLUX DISEASE WITHOUT ESOPHAGITIS: ICD-10-CM

## 2024-03-11 DIAGNOSIS — E78.2 MIXED HYPERLIPIDEMIA: ICD-10-CM

## 2024-03-11 DIAGNOSIS — R21 SKIN RASH: ICD-10-CM

## 2024-03-11 DIAGNOSIS — M62.838 MUSCLE SPASM: ICD-10-CM

## 2024-03-11 DIAGNOSIS — R09.81 NASAL CONGESTION: ICD-10-CM

## 2024-03-11 NOTE — TELEPHONE ENCOUNTER
----- Message from Joy Salas sent at 3/11/2024  8:46 AM EDT -----  Subject: Refill Request    QUESTIONS  Name of Medication? fluticasone (FLONASE) 50 MCG/ACT nasal spray  Patient-reported dosage and instructions? 50mg  How many days do you have left? 0  Preferred Pharmacy? [facility]  Pharmacy phone number (if available)? [phone]  ---------------------------------------------------------------------------  --------------,  Name of Medication? ammonium lactate (AMLACTIN) 12 % cream  Patient-reported dosage and instructions? 12 % cream  How many days do you have left? 0  Preferred Pharmacy? [facility]  Pharmacy phone number (if available)? [phone]  ---------------------------------------------------------------------------  --------------,  Name of Medication? lisinopril (PRINIVIL;ZESTRIL) 40 MG tablet  Patient-reported dosage and instructions? 40 mg  How many days do you have left? 0  Preferred Pharmacy? [facility]  Pharmacy phone number (if available)? [phone]  ---------------------------------------------------------------------------  --------------,  Name of Medication? cyclobenzaprine (FLEXERIL) 10 MG tablet  Patient-reported dosage and instructions? 10 mg  How many days do you have left? 0  Preferred Pharmacy? [facility]  Pharmacy phone number (if available)? [phone]  ---------------------------------------------------------------------------  --------------,  Name of Medication? Other - omeprazole   Patient-reported dosage and instructions? 20 mg  How many days do you have left? 0  Preferred Pharmacy? [facility]  Pharmacy phone number (if available)? [phone]  ---------------------------------------------------------------------------  --------------,  Name of Medication? hydroCHLOROthiazide (HYDRODIURIL) 50 MG tablet  Patient-reported dosage and instructions? 50mg   How many days do you

## 2024-03-14 RX ORDER — FLUTICASONE PROPIONATE 50 MCG
2 SPRAY, SUSPENSION (ML) NASAL DAILY
Qty: 16 G | Refills: 1 | Status: SHIPPED | OUTPATIENT
Start: 2024-03-14

## 2024-03-14 RX ORDER — LISINOPRIL 40 MG/1
40 TABLET ORAL DAILY
Qty: 90 TABLET | Refills: 1 | OUTPATIENT
Start: 2024-03-14

## 2024-03-14 RX ORDER — ATORVASTATIN CALCIUM 40 MG/1
40 TABLET, FILM COATED ORAL DAILY
Qty: 90 TABLET | Refills: 1 | Status: SHIPPED | OUTPATIENT
Start: 2024-03-14

## 2024-03-14 RX ORDER — AMMONIUM LACTATE 12 G/100G
CREAM TOPICAL
Qty: 385 G | Refills: 0 | Status: SHIPPED | OUTPATIENT
Start: 2024-03-14

## 2024-03-14 RX ORDER — OMEPRAZOLE 20 MG/1
40 TABLET, DELAYED RELEASE ORAL DAILY
Qty: 90 TABLET | Refills: 1 | Status: SHIPPED | OUTPATIENT
Start: 2024-03-14

## 2024-03-14 RX ORDER — ATENOLOL 50 MG/1
50 TABLET ORAL DAILY
Qty: 90 TABLET | Refills: 1 | OUTPATIENT
Start: 2024-03-14

## 2024-03-14 RX ORDER — DULOXETIN HYDROCHLORIDE 30 MG/1
30 CAPSULE, DELAYED RELEASE ORAL DAILY
Qty: 90 CAPSULE | Refills: 0 | Status: SHIPPED | OUTPATIENT
Start: 2024-03-14

## 2024-03-14 RX ORDER — HYDROCHLOROTHIAZIDE 50 MG/1
50 TABLET ORAL DAILY
Qty: 90 TABLET | Refills: 1 | OUTPATIENT
Start: 2024-03-14

## 2024-03-14 RX ORDER — CYCLOBENZAPRINE HCL 10 MG
TABLET ORAL
Qty: 180 TABLET | Refills: 1 | Status: SHIPPED | OUTPATIENT
Start: 2024-03-14

## 2024-05-07 ENCOUNTER — TELEPHONE (OUTPATIENT)
Facility: CLINIC | Age: 44
End: 2024-05-07

## 2024-06-19 DIAGNOSIS — R09.81 NASAL CONGESTION: ICD-10-CM

## 2024-06-20 RX ORDER — FLUTICASONE PROPIONATE 50 MCG
SPRAY, SUSPENSION (ML) NASAL
Qty: 16 G | Refills: 1 | Status: SHIPPED | OUTPATIENT
Start: 2024-06-20

## 2024-07-11 DIAGNOSIS — E78.2 MIXED HYPERLIPIDEMIA: ICD-10-CM

## 2024-07-11 RX ORDER — ATORVASTATIN CALCIUM 40 MG/1
40 TABLET, FILM COATED ORAL DAILY
Qty: 90 TABLET | Refills: 1 | Status: SHIPPED | OUTPATIENT
Start: 2024-07-11

## 2024-08-18 DIAGNOSIS — I10 PRIMARY HYPERTENSION: ICD-10-CM

## 2024-08-19 RX ORDER — HYDROCHLOROTHIAZIDE 50 MG/1
50 TABLET ORAL DAILY
Qty: 90 TABLET | Refills: 1 | Status: SHIPPED | OUTPATIENT
Start: 2024-08-19

## 2024-08-19 RX ORDER — ATENOLOL 50 MG/1
50 TABLET ORAL DAILY
Qty: 90 TABLET | Refills: 1 | Status: SHIPPED | OUTPATIENT
Start: 2024-08-19

## 2024-08-19 RX ORDER — LISINOPRIL 40 MG/1
40 TABLET ORAL DAILY
Qty: 90 TABLET | Refills: 1 | Status: SHIPPED | OUTPATIENT
Start: 2024-08-19

## 2024-08-23 ENCOUNTER — TELEPHONE (OUTPATIENT)
Facility: CLINIC | Age: 44
End: 2024-08-23

## 2024-08-23 NOTE — TELEPHONE ENCOUNTER
Pt's omeprazole (PRILOSEC OTC) 20 MG tablet needs to be written for the 40 mg 1 tab daily and a 90 day rx. Please send new Rx to [facility].

## 2024-08-27 DIAGNOSIS — K21.9 GASTROESOPHAGEAL REFLUX DISEASE WITHOUT ESOPHAGITIS: ICD-10-CM

## 2024-08-27 RX ORDER — OMEPRAZOLE 40 MG/1
40 CAPSULE, DELAYED RELEASE ORAL
Qty: 90 CAPSULE | Refills: 1 | Status: SHIPPED | OUTPATIENT
Start: 2024-08-27

## 2024-10-15 NOTE — TELEPHONE ENCOUNTER
Attempted to call patient, no answer. Unable to leave message.    Patient due for f/u visit.Please schedule upon return call.

## 2024-10-16 RX ORDER — CETIRIZINE HYDROCHLORIDE 5 MG/1
10 TABLET ORAL DAILY PRN
Qty: 300 ML | Refills: 0 | Status: SHIPPED | OUTPATIENT
Start: 2024-10-16

## 2025-01-27 DIAGNOSIS — I63.312 CEREBROVASCULAR ACCIDENT (CVA) DUE TO THROMBOSIS OF LEFT MIDDLE CEREBRAL ARTERY (HCC): ICD-10-CM

## 2025-01-31 RX ORDER — ASPIRIN 81 MG/1
TABLET, CHEWABLE ORAL
Qty: 180 TABLET | Refills: 1 | Status: SHIPPED | OUTPATIENT
Start: 2025-01-31

## 2025-04-07 DIAGNOSIS — E78.2 MIXED HYPERLIPIDEMIA: ICD-10-CM

## 2025-04-08 RX ORDER — ATORVASTATIN CALCIUM 40 MG/1
40 TABLET, FILM COATED ORAL DAILY
Qty: 90 TABLET | Refills: 1 | Status: SHIPPED | OUTPATIENT
Start: 2025-04-08

## 2025-04-24 DIAGNOSIS — I10 PRIMARY HYPERTENSION: ICD-10-CM

## 2025-04-24 DIAGNOSIS — K21.9 GASTROESOPHAGEAL REFLUX DISEASE WITHOUT ESOPHAGITIS: ICD-10-CM

## 2025-04-24 RX ORDER — HYDROCHLOROTHIAZIDE 50 MG/1
50 TABLET ORAL DAILY
Qty: 90 TABLET | Refills: 1 | Status: SHIPPED | OUTPATIENT
Start: 2025-04-24

## 2025-04-24 RX ORDER — LISINOPRIL 40 MG/1
40 TABLET ORAL DAILY
Qty: 90 TABLET | Refills: 1 | Status: SHIPPED | OUTPATIENT
Start: 2025-04-24

## 2025-04-24 RX ORDER — OMEPRAZOLE 40 MG/1
40 CAPSULE, DELAYED RELEASE ORAL
Qty: 90 CAPSULE | Refills: 1 | Status: SHIPPED | OUTPATIENT
Start: 2025-04-24

## 2025-04-24 RX ORDER — ATENOLOL 50 MG/1
50 TABLET ORAL DAILY
Qty: 90 TABLET | Refills: 1 | Status: SHIPPED | OUTPATIENT
Start: 2025-04-24

## 2025-04-24 NOTE — TELEPHONE ENCOUNTER
atenolol (TENORMIN) 50 MG tablet   hydroCHLOROthiazide (HYDRODIURIL) 50 MG tablet   lisinopril (PRINIVIL;ZESTRIL) 40 MG tablet   omeprazole (PRILOSEC) 40 MG delayed release capsule    Pt took the last of her meds last night. Pt has appt on 4-28, but needs refills before then. Please send to [facility].

## 2025-05-06 NOTE — LETTER
Elizabeth Travis is a 68 year old female presenting with:    []  Neck Pain     []  Upper Back Pain     []  Middle Back Pain     [x]  Lower Back Pain     Extremities:  []  Shoulder   []  Hip  []  Knee     Other:  []  Headaches  []  Migraines  []  TMJ         There were no vitals taken for this visit.    Latex Allergy or Sensitivity:  []  Reports  [x]  Denies       Allergies:   Elizabeth Wynn is allergic to toradol and bactrim.       Tobacco Usage:   Elizabeth Wynn  reports that she has never smoked. She has never used smokeless tobacco.     2022 3:00 PM    Ms. Rosenthal Code  [id number] [facility] [id number]      [facility]  [address] [place] [address], [place] [address]., [place], [address] [place] [address]  [phone]    Monitor blood pressure outside the office several times weekly at different times during the day and evening. Bring with you to next office visit.   Blood Pressure Record     Patient Name:  ______________________ :  ______________________    Date/Time BP Reading Pulse